# Patient Record
Sex: FEMALE | Race: BLACK OR AFRICAN AMERICAN | NOT HISPANIC OR LATINO | URBAN - METROPOLITAN AREA
[De-identification: names, ages, dates, MRNs, and addresses within clinical notes are randomized per-mention and may not be internally consistent; named-entity substitution may affect disease eponyms.]

---

## 2022-11-03 ENCOUNTER — INPATIENT (INPATIENT)
Facility: HOSPITAL | Age: 62
LOS: 8 days | Discharge: NURSING, REHAB OR LONG-TERM CARE | DRG: 641 | End: 2022-11-12
Attending: INTERNAL MEDICINE | Admitting: INTERNAL MEDICINE
Payer: MEDICAID

## 2022-11-03 VITALS
OXYGEN SATURATION: 99 % | HEIGHT: 63 IN | DIASTOLIC BLOOD PRESSURE: 79 MMHG | RESPIRATION RATE: 18 BRPM | TEMPERATURE: 98 F | HEART RATE: 108 BPM | WEIGHT: 125 LBS | SYSTOLIC BLOOD PRESSURE: 124 MMHG

## 2022-11-03 LAB
ALBUMIN SERPL ELPH-MCNC: 3.7 G/DL — SIGNIFICANT CHANGE UP (ref 3.3–5)
ALBUMIN SERPL ELPH-MCNC: 3.8 G/DL — SIGNIFICANT CHANGE UP (ref 3.3–5)
ALP SERPL-CCNC: 74 U/L — SIGNIFICANT CHANGE UP (ref 40–120)
ALP SERPL-CCNC: 78 U/L — SIGNIFICANT CHANGE UP (ref 40–120)
ALT FLD-CCNC: 11 U/L — SIGNIFICANT CHANGE UP (ref 10–45)
ALT FLD-CCNC: 11 U/L — SIGNIFICANT CHANGE UP (ref 10–45)
ANION GAP SERPL CALC-SCNC: 13 MMOL/L — SIGNIFICANT CHANGE UP (ref 5–17)
ANION GAP SERPL CALC-SCNC: 8 MMOL/L — SIGNIFICANT CHANGE UP (ref 5–17)
APAP SERPL-MCNC: <5 UG/ML — LOW (ref 10–30)
APTT BLD: 27 SEC — LOW (ref 27.5–35.5)
AST SERPL-CCNC: 20 U/L — SIGNIFICANT CHANGE UP (ref 10–40)
AST SERPL-CCNC: 20 U/L — SIGNIFICANT CHANGE UP (ref 10–40)
BASOPHILS # BLD AUTO: 0.05 K/UL — SIGNIFICANT CHANGE UP (ref 0–0.2)
BASOPHILS NFR BLD AUTO: 0.5 % — SIGNIFICANT CHANGE UP (ref 0–2)
BILIRUB SERPL-MCNC: 0.3 MG/DL — SIGNIFICANT CHANGE UP (ref 0.2–1.2)
BILIRUB SERPL-MCNC: 0.3 MG/DL — SIGNIFICANT CHANGE UP (ref 0.2–1.2)
BUN SERPL-MCNC: 6 MG/DL — LOW (ref 7–23)
BUN SERPL-MCNC: 9 MG/DL — SIGNIFICANT CHANGE UP (ref 7–23)
CALCIUM SERPL-MCNC: 8.6 MG/DL — SIGNIFICANT CHANGE UP (ref 8.4–10.5)
CALCIUM SERPL-MCNC: 9.2 MG/DL — SIGNIFICANT CHANGE UP (ref 8.4–10.5)
CHLORIDE SERPL-SCNC: 104 MMOL/L — SIGNIFICANT CHANGE UP (ref 96–108)
CHLORIDE SERPL-SCNC: 98 MMOL/L — SIGNIFICANT CHANGE UP (ref 96–108)
CO2 SERPL-SCNC: 27 MMOL/L — SIGNIFICANT CHANGE UP (ref 22–31)
CO2 SERPL-SCNC: 27 MMOL/L — SIGNIFICANT CHANGE UP (ref 22–31)
CREAT SERPL-MCNC: 0.63 MG/DL — SIGNIFICANT CHANGE UP (ref 0.5–1.3)
CREAT SERPL-MCNC: 0.71 MG/DL — SIGNIFICANT CHANGE UP (ref 0.5–1.3)
EGFR: 100 ML/MIN/1.73M2 — SIGNIFICANT CHANGE UP
EGFR: 95 ML/MIN/1.73M2 — SIGNIFICANT CHANGE UP
EOSINOPHIL # BLD AUTO: 0.07 K/UL — SIGNIFICANT CHANGE UP (ref 0–0.5)
EOSINOPHIL NFR BLD AUTO: 0.7 % — SIGNIFICANT CHANGE UP (ref 0–6)
ETHANOL SERPL-MCNC: <10 MG/DL — SIGNIFICANT CHANGE UP (ref 0–10)
GLUCOSE SERPL-MCNC: 101 MG/DL — HIGH (ref 70–99)
GLUCOSE SERPL-MCNC: 96 MG/DL — SIGNIFICANT CHANGE UP (ref 70–99)
HCT VFR BLD CALC: 39.3 % — SIGNIFICANT CHANGE UP (ref 34.5–45)
HGB BLD-MCNC: 13.5 G/DL — SIGNIFICANT CHANGE UP (ref 11.5–15.5)
IMM GRANULOCYTES NFR BLD AUTO: 0.4 % — SIGNIFICANT CHANGE UP (ref 0–0.9)
INR BLD: 0.99 — SIGNIFICANT CHANGE UP (ref 0.88–1.16)
LYMPHOCYTES # BLD AUTO: 2.25 K/UL — SIGNIFICANT CHANGE UP (ref 1–3.3)
LYMPHOCYTES # BLD AUTO: 23 % — SIGNIFICANT CHANGE UP (ref 13–44)
MAGNESIUM SERPL-MCNC: 1.4 MG/DL — LOW (ref 1.6–2.6)
MCHC RBC-ENTMCNC: 34.4 GM/DL — SIGNIFICANT CHANGE UP (ref 32–36)
MCHC RBC-ENTMCNC: 34.4 PG — HIGH (ref 27–34)
MCV RBC AUTO: 100.3 FL — HIGH (ref 80–100)
MONOCYTES # BLD AUTO: 0.59 K/UL — SIGNIFICANT CHANGE UP (ref 0–0.9)
MONOCYTES NFR BLD AUTO: 6 % — SIGNIFICANT CHANGE UP (ref 2–14)
NEUTROPHILS # BLD AUTO: 6.77 K/UL — SIGNIFICANT CHANGE UP (ref 1.8–7.4)
NEUTROPHILS NFR BLD AUTO: 69.4 % — SIGNIFICANT CHANGE UP (ref 43–77)
NRBC # BLD: 0 /100 WBCS — SIGNIFICANT CHANGE UP (ref 0–0)
PLATELET # BLD AUTO: 143 K/UL — LOW (ref 150–400)
POTASSIUM SERPL-MCNC: 2.8 MMOL/L — CRITICAL LOW (ref 3.5–5.3)
POTASSIUM SERPL-MCNC: 3.8 MMOL/L — SIGNIFICANT CHANGE UP (ref 3.5–5.3)
POTASSIUM SERPL-SCNC: 2.8 MMOL/L — CRITICAL LOW (ref 3.5–5.3)
POTASSIUM SERPL-SCNC: 3.8 MMOL/L — SIGNIFICANT CHANGE UP (ref 3.5–5.3)
PROT SERPL-MCNC: 6.7 G/DL — SIGNIFICANT CHANGE UP (ref 6–8.3)
PROT SERPL-MCNC: 6.7 G/DL — SIGNIFICANT CHANGE UP (ref 6–8.3)
PROTHROM AB SERPL-ACNC: 11.8 SEC — SIGNIFICANT CHANGE UP (ref 10.5–13.4)
RBC # BLD: 3.92 M/UL — SIGNIFICANT CHANGE UP (ref 3.8–5.2)
RBC # FLD: 12.3 % — SIGNIFICANT CHANGE UP (ref 10.3–14.5)
SALICYLATES SERPL-MCNC: <0.3 MG/DL — LOW (ref 2.8–20)
SARS-COV-2 RNA SPEC QL NAA+PROBE: NEGATIVE — SIGNIFICANT CHANGE UP
SODIUM SERPL-SCNC: 138 MMOL/L — SIGNIFICANT CHANGE UP (ref 135–145)
SODIUM SERPL-SCNC: 139 MMOL/L — SIGNIFICANT CHANGE UP (ref 135–145)
TROPONIN T SERPL-MCNC: 0.01 NG/ML — SIGNIFICANT CHANGE UP (ref 0–0.01)
WBC # BLD: 9.77 K/UL — SIGNIFICANT CHANGE UP (ref 3.8–10.5)
WBC # FLD AUTO: 9.77 K/UL — SIGNIFICANT CHANGE UP (ref 3.8–10.5)

## 2022-11-03 PROCEDURE — 70496 CT ANGIOGRAPHY HEAD: CPT | Mod: 26,MA

## 2022-11-03 PROCEDURE — 99285 EMERGENCY DEPT VISIT HI MDM: CPT

## 2022-11-03 PROCEDURE — 70450 CT HEAD/BRAIN W/O DYE: CPT | Mod: 26,MA,59

## 2022-11-03 PROCEDURE — 70498 CT ANGIOGRAPHY NECK: CPT | Mod: 26,MA

## 2022-11-03 PROCEDURE — 71045 X-RAY EXAM CHEST 1 VIEW: CPT | Mod: 26

## 2022-11-03 PROCEDURE — 72125 CT NECK SPINE W/O DYE: CPT | Mod: 26,MA

## 2022-11-03 PROCEDURE — 99221 1ST HOSP IP/OBS SF/LOW 40: CPT

## 2022-11-03 RX ORDER — ACYCLOVIR SODIUM 500 MG
570 VIAL (EA) INTRAVENOUS ONCE
Refills: 0 | Status: COMPLETED | OUTPATIENT
Start: 2022-11-03 | End: 2022-11-03

## 2022-11-03 RX ORDER — POTASSIUM CHLORIDE 20 MEQ
20 PACKET (EA) ORAL
Refills: 0 | Status: COMPLETED | OUTPATIENT
Start: 2022-11-03 | End: 2022-11-03

## 2022-11-03 RX ORDER — ACYCLOVIR SODIUM 500 MG
VIAL (EA) INTRAVENOUS
Refills: 0 | Status: DISCONTINUED | OUTPATIENT
Start: 2022-11-03 | End: 2022-11-03

## 2022-11-03 RX ORDER — POTASSIUM CHLORIDE 20 MEQ
40 PACKET (EA) ORAL ONCE
Refills: 0 | Status: COMPLETED | OUTPATIENT
Start: 2022-11-03 | End: 2022-11-03

## 2022-11-03 RX ORDER — ACETAMINOPHEN 500 MG
975 TABLET ORAL ONCE
Refills: 0 | Status: COMPLETED | OUTPATIENT
Start: 2022-11-03 | End: 2022-11-03

## 2022-11-03 RX ORDER — MAGNESIUM SULFATE 500 MG/ML
2 VIAL (ML) INJECTION ONCE
Refills: 0 | Status: COMPLETED | OUTPATIENT
Start: 2022-11-03 | End: 2022-11-03

## 2022-11-03 RX ORDER — SODIUM CHLORIDE 9 MG/ML
1000 INJECTION INTRAMUSCULAR; INTRAVENOUS; SUBCUTANEOUS ONCE
Refills: 0 | Status: COMPLETED | OUTPATIENT
Start: 2022-11-03 | End: 2022-11-03

## 2022-11-03 RX ORDER — THIAMINE MONONITRATE (VIT B1) 100 MG
100 TABLET ORAL ONCE
Refills: 0 | Status: COMPLETED | OUTPATIENT
Start: 2022-11-03 | End: 2022-11-03

## 2022-11-03 RX ADMIN — SODIUM CHLORIDE 1000 MILLILITER(S): 9 INJECTION INTRAMUSCULAR; INTRAVENOUS; SUBCUTANEOUS at 15:50

## 2022-11-03 RX ADMIN — Medication 50 MILLIEQUIVALENT(S): at 16:52

## 2022-11-03 RX ADMIN — Medication 25 GRAM(S): at 16:52

## 2022-11-03 RX ADMIN — Medication 40 MILLIEQUIVALENT(S): at 16:53

## 2022-11-03 RX ADMIN — Medication 50 MILLIEQUIVALENT(S): at 23:30

## 2022-11-03 RX ADMIN — Medication 50 MILLIEQUIVALENT(S): at 20:43

## 2022-11-03 RX ADMIN — Medication 100 MILLIGRAM(S): at 20:24

## 2022-11-03 RX ADMIN — Medication 570 MILLIGRAM(S): at 22:05

## 2022-11-03 RX ADMIN — Medication 1 MILLIGRAM(S): at 18:19

## 2022-11-03 RX ADMIN — Medication 100 MILLIGRAM(S): at 21:45

## 2022-11-03 NOTE — ED PROVIDER NOTE - CLINICAL SUMMARY MEDICAL DECISION MAKING FREE TEXT BOX
pt poor historian, majority information from friend Theo Montes [phone]  62 F h/o etoh abuse (approx 6-8 beers daily) brought in by friends for AMS and difficulty walking, + recent falls.  on exam pt mild tachycardia, normotensive, no tremor/tongue fasciculation, a/ox2 (unsure date, states June 2024), unable to ambulate but no focal deficits noted so less likely cva.  c/f ICH, hydrocephalus, substance induced, wernicke vs metabolic encephalopathy.  does not appear to be in etoh w/d.  will obtain labs, cxr, CT head/cspine, CTA head/neck

## 2022-11-03 NOTE — ED PROVIDER NOTE - NS ED ATTENDING STATEMENT MOD
This was a shared visit with the KEY. I reviewed and verified the documentation and independently performed the documented:

## 2022-11-03 NOTE — ED ADULT NURSE NOTE - SUICIDE SCREENING QUESTION 3
Last Sat 9/4/2021 she started with  Nausea, dizziness and profuse sweating symp continue Sun  Mon  Now more clammy than sweating   Able to eating but do not feeling, drinking fluids   Dizziness   A rush feeling pounding eyes go side to side        Denies chest pain, SOB  
Per ROCIO Flower NP  Appointment scheduled 3:50 9/9/2021  Patient informed if symptoms become worse or is concerned should go to the ER or UC.    Patient agrees with plan  
No

## 2022-11-03 NOTE — ED ADULT NURSE NOTE - OBJECTIVE STATEMENT
62y female presents to ED c/o multiple falls in the last month, generalized body aches, dizziness, and failure to thrive. Presents with friends/roomates who state pt has had multiple falls starting one month ago, most recently three days ago and sustained swelling to left forehead. Since falls pt has been less able to walk around and has not been using the bathroom as much. Pt endorses RLQ pain and states she hasn't been eating/drinking as usual. Per friends, pt usually drinks 6 pack of beer a day but has been drinking less due to decreased mobility. GLORIA. A&Ox3, disoriented to time/situation.

## 2022-11-03 NOTE — ED ADULT TRIAGE NOTE - OTHER COMPLAINTS
Friend also reports patient drinks alcohol daily, reports last drink was yesterday. Friend also reports patient drinks alcohol daily, reports last drink was yesterday. Friend reports patient has been urinating on herself.

## 2022-11-03 NOTE — ED PROVIDER NOTE - OBJECTIVE STATEMENT
pt poor historian, majority information from friend Theo Montes [phone]  62 F h/o etoh abuse (approx 6-8 beers daily) brought in by friends for AMS and difficulty walking.  As per friend she fell 1 month ago sustained lac to forehead but does not believe she was ever evaluated.  Pt noted approx 5 days ago she was on ground of her apartment stating she just wanted to stretch but when he returned 6+ hrs later she had not moved, since has been having difficulty walking.  Since the weekend pt has been progressively altered, not eating or taking care of herself.  friend has been giving her approx 1-2 beers daily to "prevent alcohol withdraw."  friend also reports she has been peeing bed as she cannot get up to walk- pt states she knows when she needs to use bathroom but cant make it.  no bowel incontinence.  pt states she has chronic R hip pain from injury years ago, friend confirms.  denies any other complaints but when attempted to ambulate she reported feeling dizzy and states its going on for a month- cannot further described dizziness.  denies f/c, headache, neck/back pain, chest pain, sob, uri sxs, abd pain, nvd, urinary sxs, numbness/weakness, paresthesia, saddle anesthesia.  denies drug use pt poor historian, majority information from friend Theo Montse [phone]  62 F h/o etoh abuse (approx 6-8 beers daily) brought in by friends for AMS and difficulty walking.  As per friend she fell 1 month ago sustained lac to forehead but does not believe she was ever evaluated.  Pt noted approx 5 days ago she was on ground of her apartment (thinks she fell) stating she just wanted to stretch but when he returned 6+ hrs later she had not moved, since has been having difficulty walking.  Since the weekend pt has been progressively altered, not eating or taking care of herself.  friend has been giving her approx 1-2 beers daily to "prevent alcohol withdraw."  friend also reports she has been peeing bed as she cannot get up to walk- pt states she knows when she needs to use bathroom but cant make it.  no bowel incontinence.  pt states she has chronic R hip pain from injury years ago, friend confirms.  denies any other complaints but when attempted to ambulate she reported feeling dizzy and states its going on for a month- cannot further described dizziness.  denies f/c, headache, neck/back pain, chest pain, sob, uri sxs, abd pain, nvd, urinary sxs, numbness/weakness, paresthesia, saddle anesthesia.  denies drug use

## 2022-11-03 NOTE — ED PROVIDER NOTE - PHYSICAL EXAMINATION
Vitals reviewed  Gen: comfortable appearing, nad, speaking in full sentences- no dysarthria  Skin: wwp, no rash/lesions  HEENT: nc, healed scar to forehead, no acute trauma noted, perrla, eomi, mmm  Neck/Back: no midline ttp/step off   CV: rrr, no audible m/r/g  Resp: symmetrical expansion, ctab, no w/r/r  Abd: nondistended, soft/nt  Ext: FROM throughout, no peripheral edema, no joint ttp, distal pulses 2+, SILT equal throughout, 5/5 strength x4  Neuro: a/ox2 (unsure date, states June 2024), no focal deficits, follows commands, unsteady gait w/ 2 person assist, no pronator drift

## 2022-11-03 NOTE — CONSULT NOTE ADULT - SUBJECTIVE AND OBJECTIVE BOX
Neurology Consult Note    HPI:  This is a 62 F with PMHx of ETOH abuse (approx 6-8 beers daily, last drink 24 hours ago) brought in by friends for AMS and difficulty walking. The patient is a poor historian, and majority of the information was obtained from friend Theo Montes [phone]. As per friend, she fell about 1 month ago and sustained a laceration to forehead but does not believe she was ever evaluated. She also lost her mother around 2 years ago and as per the friend who lives with her, and has been more isolated and depressed since then. The patient approximately 5 days ago was on the round of her apartment (she thinks she fell) stating she just wanted to stretch, but when he returned 6+ hrs later she had not moved. He states that she has not been eating or drinking well over the last 2 weeks or so. He has also stated that she mostly stays in her recliner and he has to keep a commode there when she needs to use the restroom. Since the weekend, the patient has been progressively altered and not taking care of herself. Her friend has been giving her approximately 1-2 beers daily over the past few days, which he states is to "prevent alcohol withdraw." She also has been seen doing cocaine "occasionally" as per the friend and she smokes cigarettes 1 pack per day. Her friend reports she has been peeing bed and chair at times as she cannot always get up - patient states she knows when she needs to use bathroom but cant make it. Denies any bowel incontinence. The patient also has stated that she has chronic R hip pain from injury years ago, which the friend confirmed. Denies any other complaints, when attempted to ambulate she reported feeling dizziness and states its going on for a month. Denies f/c, headache, neck/back pain, chest pain, sob, uri sxs, abd pain, nvd, urinary sxs, numbness/weakness, paresthesia, saddle anesthesia.      PAST MEDICAL & SURGICAL HISTORY:  ETOH abuse      Medications:  acyclovir IVPB 570 milliGRAM(s) IV Intermittent once  potassium chloride  20 mEq/100 mL IVPB 20 milliEquivalent(s) IV Intermittent every 2 hours      Vital Signs Last 24 Hrs  T(C): 36.1 (03 Nov 2022 19:05), Max: 36.6 (03 Nov 2022 14:18)  T(F): 96.9 (03 Nov 2022 19:05), Max: 97.9 (03 Nov 2022 14:18)  HR: 108 (03 Nov 2022 14:18) (108 - 108)  BP: 124/79 (03 Nov 2022 14:18) (124/79 - 124/79)  RR: 18 (03 Nov 2022 14:18) (18 - 18)  SpO2: 99% (03 Nov 2022 14:18) (99% - 99%)    Parameters below as of 03 Nov 2022 14:18  Patient On (Oxygen Delivery Method): room air      Neurological Exam:   Mental status: Lethargic, but arousable. AAOx0. States "get away from". No dysarthria, no aphasia. Follows commands to squeeze hand.  Cranial nerves: Pupils equally round, pinpoint and reactive to light, face symmetric, hearing intact to finger rub, tongue was midline.  Motor: All four extremities anti-gravity.  strength 5/5. Normal tone and bulk. No abnormal movements.    Sensation: Withdraws to painful stimuli.  Coordination: Unable to assess.  Reflexes: 2+ in bilateral UE/LE, downgoing toes bilaterally.  Gait: Narrow and slow, steady. No ataxia.       Labs:  CBC Full  -  ( 03 Nov 2022 15:56 )  WBC Count : 9.77 K/uL  RBC Count : 3.92 M/uL  Hemoglobin : 13.5 g/dL  Hematocrit : 39.3 %  Platelet Count - Automated : 143 K/uL  Mean Cell Volume : 100.3 fl  Mean Cell Hemoglobin : 34.4 pg  Mean Cell Hemoglobin Concentration : 34.4 gm/dL  Auto Neutrophil # : 6.77 K/uL  Auto Lymphocyte # : 2.25 K/uL  Auto Monocyte # : 0.59 K/uL  Auto Eosinophil # : 0.07 K/uL  Auto Basophil # : 0.05 K/uL  Auto Neutrophil % : 69.4 %  Auto Lymphocyte % : 23.0 %  Auto Monocyte % : 6.0 %  Auto Eosinophil % : 0.7 %  Auto Basophil % : 0.5 %    11-03    138  |  98  |  9   ----------------------------<  101<H>  2.8<LL>   |  27  |  0.71    Ca    9.2      03 Nov 2022 15:56  Mg     1.4     11-03    TPro  6.7  /  Alb  3.8  /  TBili  0.3  /  DBili  x   /  AST  20  /  ALT  11  /  AlkPhos  78  11-03    LIVER FUNCTIONS - ( 03 Nov 2022 15:56 )  Alb: 3.8 g/dL / Pro: 6.7 g/dL / ALK PHOS: 78 U/L / ALT: 11 U/L / AST: 20 U/L / GGT: x           PT/INR - ( 03 Nov 2022 15:56 )   PT: 11.8 sec;   INR: 0.99       PTT - ( 03 Nov 2022 15:56 )  PTT:27.0 sec      < from: CT Head No Cont (11.03.22 @ 19:08) >  PROCEDURE DATE:  11/03/2022          INTERPRETATION:  Axial images were obtained from the skull base to the   cranial vertex without intravenous contrast. Soft tissue and bone   algorithm images were evaluated.    Clinical information: Multiple falls, confusion, dizziness.    The study is substantially degraded by patient motion.    There is diffuse cerebral volume loss without hydrocephalus. There is no   midline shift or large extracerebral collection. There is no large   parenchymal hematoma. There is extensive confluent hypodensity in the   cerebral white matter that is nonspecific, with remote lacunar infarcts   identified in the bilateral basal ganglia and thalami. There is no   displaced fracture of the skull or skull base. Included paranasal sinuses   and mastoid air cells are predominantly ventilated.    IMPRESSION:    Severely motion degraded study shows diffuse cerebral volume loss and   extensive white matter hypodensity without midline shift, mass effect or   large hematoma.    --- End of Report ---    < end of copied text >      < from: CT Cervical Spine No Cont (11.03.22 @ 19:10) >  PROCEDURE DATE:  11/03/2022          INTERPRETATION:  A thin section axial acquisition was performed through   the cervical spine and reconstructed in the sagittal, coronal and axial   planes utilizing bone and soft tissue algorithms.  Intravenous contrast   was not administered.    Clinical information: Multiple falls.    The cervical vertebrae are normal in height with minimal retrolisthesis   of C4 on C5 that is likely degenerative. There is no fracture. There is   no prevertebral soft tissue swelling. There is multilevel degenerative   disease of the cervical disks as well as the facet and uncovertebral   joints with central disc protrusions from C3/C4 through C5/C6. The   included cervical soft tissues are grossly unremarkable.    IMPRESSION:    No fracture. Multilevel degenerative disease with central disc   protrusions, as above.    --- End of Report ---    < end of copied text >      < from: CT Angio Head w/ IV Cont (11.03.22 @ 19:11) >  PROCEDURE DATE:  11/03/2022    ******PRELIMINARY REPORT******      ******PRELIMINARY REPORT******           INTERPRETATION:  [facility] PRELIMINARY REPORT      Initial report created on 11/3/2022 7:39:24 PM EDT  PROCEDURE INFORMATION:  Exam: CTA Head With Contrast, Arteriography  Exam date and time: 11/3/2022 6:28 PM  Age: 62 years old  Clinical indication: Mult falls, confusion, dizziness    TECHNIQUE:  Imaging protocol: Computed tomographic angiography of the head with   contrast,  including non-contrast images if performed. Exam focused on the arteries.    COMPARISON:  No relevant prior studies available.    FINDINGS:    ANTERIOR CIRCULATION:  Right internal carotid artery: Intracranial segment is patent with no  significant stenosis. No aneurysm.  Right middle cerebral artery: No occlusion or significant stenosis. No  aneurysm.  Right anterior cerebral artery: No occlusion or significant stenosis. No  aneurysm.    Left internal carotid artery: Intracranial segment is patent with no  significant stenosis. No aneurysm.  Left middle cerebral artery: No occlusion or significant stenosis. No   aneurysm.    Left anterior cerebral artery: No occlusion or significant stenosis. No  aneurysm.    POSTERIOR CIRCULATION:  Right vertebral artery: No occlusion or significant stenosis. No aneurysm.  Left vertebral artery: No occlusion or significant stenosis. No aneurysm.  Basilar artery: No occlusion or significant stenosis. No aneurysm.  Right posterior cerebral artery: Fetal origin of the right posterior   cerebral  artery is a common developmental variant and there is no occlusion or  significant stenosis. No aneurysm.  Left posterior cerebral artery: No occlusion or significant stenosis. No  aneurysm.    IMPRESSION:  No large vessel stenosis or occlusion detected involving the major   branches of  the anterior or posterior intracranial circulation.    < end of copied text >

## 2022-11-03 NOTE — ED ADULT NURSE NOTE - NSIMPLEMENTINTERV_GEN_ALL_ED
Implemented All Fall Risk Interventions:  [place] to call system. Call bell, personal items and telephone within reach. Instruct patient to call for assistance. Room bathroom lighting operational. Non-slip footwear when patient is off stretcher. Physically safe environment: no spills, clutter or unnecessary equipment. Stretcher in lowest position, wheels locked, appropriate side rails in place. Provide visual cue, wrist band, yellow gown, etc. Monitor gait and stability. Monitor for mental status changes and reorient to person, place, and time. Review medications for side effects contributing to fall risk. Reinforce activity limits and safety measures with patient and family.

## 2022-11-03 NOTE — CONSULT NOTE ADULT - SUBJECTIVE AND OBJECTIVE BOX
MICU CONSULT NOTE    HPI:  63 y/o F poor historian w PMHx of etoh abuse, bib friend for ams, difficulty ambulating, not caring for herself x 2 wks. As per friend patient feel 1 month ago, with trauma to the head but unsure if the patient seek medical attention. As per him the patient has been confused and altered since 2 weeks ago. Patient saying her mother is still alive, and saying people are around the house although there is none. 2 weeks ago patient had decreased PO intake, and decreased mobility and unsteadiness. For the last days friend has probably provided 1-2 beers per day to avoid withdrawal. Patient previously was consume 6 beers per day. Friend unsure if patient has history of seizures, intubation, previous alcohol withdrawals nor esophageal bleeds. Patient was found down on Sunday, and after that patient has dependent of friend for mobility. As per the ED provider, patient initially was AAO x 2, no tongue fasiculations, or tremors. + unsteady on feet and unable to ambulate. ICU consulted for concerns of Wernicke encephalopathy.     Unable to obtain ROS.     PMH/PSH  As per friend no medications at home. Patient unable to provide PMH/PSH.     FH:  Unable to obtain, patient altered not able to cooperate.     SH:  Patient lives with a friend that acts like a care giver.   Patient with long standing history of alcohol use disorder, unsure how much nor for how long she has been a drinker. As per friend she lately was having 6 beers a day, and in the last days was giving 1-2 beers in order to prevent withdrawal.  Current smoker      ED COURSE:   VS: /79      RR 18    SaO2 99 @RA    T 97.9  LABS: HGB 13.5  .3  K 2.8  Creat 0.71  Mag 1.4  Trop T 0.01  ABL <10  Acetaminophen <5  COVID negative     EKG: diffuse T wave inversion I, II, III, aVF, V3-V6  IMAGING:   -CXR: No acute cardiopulmonary process.  -CTH: performed, will follow read   MANAGEMENT Magnesium 2 mg IV x1, KCl 20 mEq IV x3 doses, KCl 40 mEq PO x 1, Lorazepam 1 mg IV x1, Thiamine 100 mg IV x1, 1L NS       VITAL SIGNS:  T(F): 97.9 (11-03-22 @ 14:18)  HR: 108 (11-03-22 @ 14:18)  BP: 124/79 (11-03-22 @ 14:18)  RR: 18 (11-03-22 @ 14:18)  SpO2: 99% (11-03-22 @ 14:18)  Wt(kg): --    PHYSICAL EXAM:    Constitutional: sleeping, arousable,   HEENT: PERRLA, EOMI, Normal Hearing, mildly dry MM  Neck: No LAD, No JVD  Back: Normal spine flexure, no pain upon palpation, no hematoma, stage 2 small ulcers in sacrum  Respiratory: CTA BL, no respiratory distress  Cardiovascular: S1 and S2, RRR, no M/G/R  Gastrointestinal: BS+, soft, NT/ND  Extremities: No peripheral edema  Vascular: 2+ peripheral pulses  Neurological: no focal deficits appreciated, moving all extremities       MEDICATIONS  (STANDING):  potassium chloride  20 mEq/100 mL IVPB 20 milliEquivalent(s) IV Intermittent every 2 hours  thiamine Injectable 100 milliGRAM(s) IV Push Once    MEDICATIONS  (PRN):      Allergies    No Known Allergies    Intolerances        LABS:                        13.5   9.77  )-----------( 143      ( 03 Nov 2022 15:56 )             39.3     11-03    138  |  98  |  9   ----------------------------<  101<H>  2.8<LL>   |  27  |  0.71    Ca    9.2      03 Nov 2022 15:56  Mg     1.4     11-03    TPro  6.7  /  Alb  3.8  /  TBili  0.3  /  DBili  x   /  AST  20  /  ALT  11  /  AlkPhos  78  11-03    PT/INR - ( 03 Nov 2022 15:56 )   PT: 11.8 sec;   INR: 0.99          PTT - ( 03 Nov 2022 15:56 )  PTT:27.0 sec      RADIOLOGY & ADDITIONAL TESTS:     MICU CONSULT NOTE    HPI:  62F poor historian PMHx of EtoH abuse (6 beers daily), MDD bib friend/roommate for AMS, difficulty ambulating, inability of caring for herself x 2 wks. As per friend patient feel 1 month ago, with trauma to the head but unsure if the patient seeked medical attention at that time although he told her to do so. Friend noted that last Sunday he found pt on ground of her apartment (thinks she fell but not sure) pt did not let him help her stating she just wanted to stretch so he left for work but when he returned 6+ hrs later she had not moved from that position. At that time he picked her up and placed on bed and since that event pt has been predominantly bedbound incontinent of bladder/bowel as not able to move 2/2 back pain and LE weakness. When asked why pt was not brought to ED for evaluation till now he said "called her family (brother) but they were not available to take pt to ED and friend could not either as he was working and had time off today so brought pt to ED today". Friend state since the weekend pt has been progressively altered, not eating or taking care of herself. He has been giving her approx 1-2 beers daily to "prevent alcohol withdraw."  Friend also reports patient has been confused and altered saying her mother is still alive, and saying people are around the house although there is none X 2wks, getting progressively worse. As per the ED provider, patient initially was AAO x 2, no tongue fasiculations, or tremors. + Unsteady on feet and unable to ambulate. ICU consulted for encephalopathy and inability to ambulate 2/2 LE weakness. Pt seen in the ED s/p Ativan 1mg IV for reported agitation in the ED. Pt AOX1 (self) at the time of assessment and unable to provide much history. Friend unsure if patient has history of previous alcohol withdrawals, hospitalizations, seizures, toxic habits, and use of daily medications at home. Per friend a month ago pt was AOX3 and able of caring for herself in regards to everything except bills/ iADLs, he was helping her w/ those activities. Unable to obtain ROS 2/2 AMS    PMH/PSH  As per friend no medications at home. Patient unable to provide PMH/PSH.     FH:  Unable to obtain, patient altered not able to cooperate.     SH:  Patient lives with a friend/ roommate that acts like a caregiver.   Patient with long standing history of alcohol use disorder, unsure how much nor for how long she has been a drinker. As per friend she lately was having 6 beers a day, and in the last days was giving 1-2 beers in order to prevent withdrawal.  Current smoker  Not sure of recreational drug use hx       ED COURSE:   VS: /79      RR 18    SaO2 99 @RA    T 97.9  LABS: HGB 13.5  .3  K 2.8  Creat 0.71  Mag 1.4  Trop T 0.01  ABL <10  Acetaminophen <5  COVID negative     EKG: diffuse T wave inversion I, II, III, aVF, V3-V6  IMAGING:   -CXR: No acute cardiopulmonary process.  MANAGEMENT Magnesium 2 mg IV x1, KCl 20 mEq IV x3 doses, KCl 40 mEq PO x 1, Lorazepam 1 mg IV x1, Thiamine 100 mg IV x1, 1L NS       VITAL SIGNS:  T(F): 97.9 (11-03-22 @ 14:18)  HR: 108 (11-03-22 @ 14:18)  BP: 124/79 (11-03-22 @ 14:18)  RR: 18 (11-03-22 @ 14:18)  SpO2: 99% (11-03-22 @ 14:18)  Wt(kg): --    PHYSICAL EXAM:    Constitutional: sleeping, arousable,   HEENT: PERRLA, EOMI, Normal Hearing, mildly dry MM  Neck: No LAD, No JVD  Back: Normal spine flexure, no pain upon palpation, no hematoma, stage 2 small ulcers in sacrum  Respiratory: CTA BL, no respiratory distress  Cardiovascular: S1 and S2, RRR, no M/G/R  Gastrointestinal: BS+, soft, NT/ND  Extremities: No peripheral edema; 3/5 RLE strength and 5/5 LLE strength  Vascular: 2+ peripheral pulses  Neurological: no focal deficits appreciated, moving all extremities       MEDICATIONS  (STANDING):  potassium chloride  20 mEq/100 mL IVPB 20 milliEquivalent(s) IV Intermittent every 2 hours  thiamine Injectable 100 milliGRAM(s) IV Push Once    MEDICATIONS  (PRN):      Allergies    No Known Allergies    Intolerances        LABS:                        13.5   9.77  )-----------( 143      ( 03 Nov 2022 15:56 )             39.3     11-03    138  |  98  |  9   ----------------------------<  101<H>  2.8<LL>   |  27  |  0.71    Ca    9.2      03 Nov 2022 15:56  Mg     1.4     11-03    TPro  6.7  /  Alb  3.8  /  TBili  0.3  /  DBili  x   /  AST  20  /  ALT  11  /  AlkPhos  78  11-03    PT/INR - ( 03 Nov 2022 15:56 )   PT: 11.8 sec;   INR: 0.99          PTT - ( 03 Nov 2022 15:56 )  PTT:27.0 sec      RADIOLOGY & ADDITIONAL TESTS:     MICU CONSULT NOTE    HPI:  62F poor historian PMHx of EtoH abuse (6 beers daily), MDD bib friend/roommate for AMS, difficulty ambulating, inability of caring for herself s/p recent falls.  As per friend patient feel 1 month ago, with trauma to the head but unsure if the patient seeked medical attention at that time although he told her to do so. Friend noted that last Sunday he found pt on ground of her apartment (thinks she fell but not sure) pt did not let him help her stating she just wanted to stretch so he left for work but when he returned 6+ hrs later she had not moved from that position. At that time he picked her up and placed on bed and since that event pt has been predominantly bedbound incontinent of bladder/bowel as not able to move 2/2 back pain and LE weakness. When asked why pt was not brought to ED for evaluation till now he said "called her family (brother) but they were not available to take pt to ED and friend could not either as he was working and had time off today so brought pt to ED today". Friend state since the weekend pt has been progressively altered, not eating or taking care of herself. He has been giving her approx 1-2 beers daily to "prevent alcohol withdraw."  Friend also reports patient has been confused and altered saying her mother is still alive, and saying people are around the house although there is none X 2wks, getting progressively worse. As per the ED provider, patient initially was AAO x 2, no tongue fasiculations, or tremors. + Unsteady on feet and unable to ambulate. ICU consulted for encephalopathy and inability to ambulate 2/2 LE weakness. Pt seen in the ED s/p Ativan 1mg IV for reported agitation in the ED. Pt AOX1 (self) at the time of assessment and unable to provide much history. Friend unsure if patient has history of previous alcohol withdrawals, hospitalizations, seizures, toxic habits, and use of daily medications at home. Per friend a month ago pt was AOX3 and able of caring for herself in regards to everything except bills/ iADLs, he was helping her w/ those activities. Unable to obtain ROS 2/2 AMS    PMH/PSH  As per friend no medications at home. Patient unable to provide PMH/PSH.     FH:  Unable to obtain, patient altered not able to cooperate.     SH:  Patient lives with a friend/ roommate that acts like a caregiver.   Patient with long standing history of alcohol use disorder, unsure how much nor for how long she has been a drinker. As per friend she lately was having 6 beers a day, and in the last days was giving 1-2 beers in order to prevent withdrawal.  Current smoker  Not sure of recreational drug use hx       ED COURSE:   VS: /79      RR 18    SaO2 99 @RA    T 97.9  LABS: HGB 13.5  .3  K 2.8  Creat 0.71  Mag 1.4  Trop T 0.01  ABL <10  Acetaminophen <5  COVID negative     EKG: diffuse T wave inversion I, II, III, aVF, V3-V6  IMAGING:   -CXR: No acute cardiopulmonary process.  MANAGEMENT Magnesium 2 mg IV x1, KCl 20 mEq IV x3 doses, KCl 40 mEq PO x 1, Lorazepam 1 mg IV x1, Thiamine 100 mg IV x1, 1L NS       VITAL SIGNS:  T(F): 97.9 (11-03-22 @ 14:18)  HR: 108 (11-03-22 @ 14:18)  BP: 124/79 (11-03-22 @ 14:18)  RR: 18 (11-03-22 @ 14:18)  SpO2: 99% (11-03-22 @ 14:18)  Wt(kg): --    PHYSICAL EXAM:    Constitutional: sleeping, arousable,   HEENT: PERRLA, EOMI, Normal Hearing, mildly dry MM  Neck: No LAD, No JVD  Back: Normal spine flexure, no pain upon palpation, no hematoma, stage 2 small ulcers in sacrum  Respiratory: CTA BL, no respiratory distress  Cardiovascular: S1 and S2, RRR, no M/G/R  Gastrointestinal: BS+, soft, NT/ND  Extremities: No peripheral edema; 3/5 RLE strength and 5/5 LLE strength  Vascular: 2+ peripheral pulses  Neurological: no focal deficits appreciated, moving all extremities       MEDICATIONS  (STANDING):  potassium chloride  20 mEq/100 mL IVPB 20 milliEquivalent(s) IV Intermittent every 2 hours  thiamine Injectable 100 milliGRAM(s) IV Push Once    MEDICATIONS  (PRN):      Allergies    No Known Allergies    Intolerances        LABS:                        13.5   9.77  )-----------( 143      ( 03 Nov 2022 15:56 )             39.3     11-03    138  |  98  |  9   ----------------------------<  101<H>  2.8<LL>   |  27  |  0.71    Ca    9.2      03 Nov 2022 15:56  Mg     1.4     11-03    TPro  6.7  /  Alb  3.8  /  TBili  0.3  /  DBili  x   /  AST  20  /  ALT  11  /  AlkPhos  78  11-03    PT/INR - ( 03 Nov 2022 15:56 )   PT: 11.8 sec;   INR: 0.99          PTT - ( 03 Nov 2022 15:56 )  PTT:27.0 sec      RADIOLOGY & ADDITIONAL TESTS:     MICU CONSULT NOTE    HPI:  62F poor historian PMHx of EtoH abuse (6 beers daily), MDD bib friend/roommate for AMS, difficulty ambulating, inability of caring for herself s/p recent falls.  As per friend patient feel 1 month ago, with trauma to the head but unsure if the patient seeked medical attention at that time. Friend noted that last Sunday he found pt on the floor of her apartment (thinks she fell but not sure) pt did not let him help stating she just wanted to stretch so he left for work but when he returned 6+ hrs later she had not moved from that position. At that time he picked her up and placed on bed and since that event pt has been predominantly bedbound incontinent of bladder/bowel as not able to move 2/2 back pain and LE weakness. When asked why pt was not brought to ED for evaluation till now he said "called her family (brother) but they were not available to take pt to ED and friend could not either as he was working and had time off today so brought pt to ED today". Friend state since the weekend pt has been progressively altered, not eating or taking care of herself. He has been giving her approx 1-2 beers daily to "prevent alcohol withdrawal", last drink ~this am.Friend also reports patient has been confused and altered saying her mother is still alive, and saying people are around the house although there is none X 2wks, getting progressively worse. As per the ED provider, patient initially was AAO x 2, no tongue fasiculations, or tremors. + Unsteady on feet and unable to ambulate. ICU consulted for encephalopathy and inability to ambulate 2/2 LE weakness. Pt seen in the ED s/p Ativan 1mg IV for reported agitation in the ED. Pt AOX1 (self) at the time of assessment and unable to provide much history. Friend unsure if patient has history of previous alcohol withdrawals, hospitalizations, seizures, toxic habits outside EtOH/tobacco use, and use of daily medications at home. Per friend a month ago pt was AOX3 and able of caring for herself except bills/ iADLs, he was helping her w/ those activities. Unable to obtain ROS 2/2 AMS at the time of assessment.     PMH/PSH  As per friend no medications at home. Patient unable to provide PMH/PSH.     FH:  Unable to obtain, patient altered not able to cooperate.     SH:  Patient lives with a friend/ roommate that acts like a caregiver.   Patient with long standing history of alcohol use disorder, unsure how much nor for how long she has been a drinker. As per friend she lately was having 6 beers a day, and in the last days was giving 1-2 beers in order to prevent withdrawal.  Current smoker  Not sure of recreational drug use hx       ED COURSE:   VS: /79      RR 18    SaO2 99 @RA    T 97.9  LABS: HGB 13.5  .3  K 2.8  Creat 0.71  Mag 1.4  Trop T 0.01  ABL <10  Acetaminophen <5  COVID negative     EKG: diffuse T wave inversion I, II, III, aVF, V3-V6  IMAGING:   -CXR: No acute cardiopulmonary process.  MANAGEMENT Magnesium 2 mg IV x1, KCl 20 mEq IV x3 doses, KCl 40 mEq PO x 1, Lorazepam 1 mg IV x1, Thiamine 100 mg IV x1, 1L NS       VITAL SIGNS:  T(F): 97.9 (11-03-22 @ 14:18)  HR: 108 (11-03-22 @ 14:18)  BP: 124/79 (11-03-22 @ 14:18)  RR: 18 (11-03-22 @ 14:18)  SpO2: 99% (11-03-22 @ 14:18)  Wt(kg): --    PHYSICAL EXAM:    Constitutional: sleeping, arousable,   HEENT: PERRLA, EOMI, Normal Hearing, mildly dry MM  Neck: No LAD, No JVD  Back: Normal spine flexure, no pain upon palpation, no hematoma, stage 2 small ulcers in sacrum  Respiratory: CTA BL, no respiratory distress  Cardiovascular: S1 and S2, RRR, no M/G/R  Gastrointestinal: BS+, soft, NT/ND  Extremities: No peripheral edema; 3/5 RLE strength and 5/5 LLE strength  Vascular: 2+ peripheral pulses  Neurological: no focal deficits appreciated, moving all extremities       MEDICATIONS  (STANDING):  potassium chloride  20 mEq/100 mL IVPB 20 milliEquivalent(s) IV Intermittent every 2 hours  thiamine Injectable 100 milliGRAM(s) IV Push Once    MEDICATIONS  (PRN):      Allergies    No Known Allergies    Intolerances        LABS:                        13.5   9.77  )-----------( 143      ( 03 Nov 2022 15:56 )             39.3     11-03    138  |  98  |  9   ----------------------------<  101<H>  2.8<LL>   |  27  |  0.71    Ca    9.2      03 Nov 2022 15:56  Mg     1.4     11-03    TPro  6.7  /  Alb  3.8  /  TBili  0.3  /  DBili  x   /  AST  20  /  ALT  11  /  AlkPhos  78  11-03    PT/INR - ( 03 Nov 2022 15:56 )   PT: 11.8 sec;   INR: 0.99          PTT - ( 03 Nov 2022 15:56 )  PTT:27.0 sec      RADIOLOGY & ADDITIONAL TESTS:

## 2022-11-03 NOTE — ED ADULT TRIAGE NOTE - CHIEF COMPLAINT QUOTE
Per friend, patient has had decreased food and fluid intake, fall a few weeks ago, difficulty walking and standing.

## 2022-11-03 NOTE — ED ADULT NURSE NOTE - OTHER COMPLAINTS
Friend also reports patient drinks alcohol daily, reports last drink was yesterday. Friend reports patient has been urinating on herself.

## 2022-11-03 NOTE — ED PROVIDER NOTE - ATTENDING APP SHARED VISIT CONTRIBUTION OF CARE
Pt is a 61yo f, poor historian, h/o etoh abuse, bib friend for ams, difficulty ambulating, not caring for herself x 2 wks. Pt fell 1 mo ago and had lac to head, unsure if pt sought medical attn. Pt fell again 1 wk ago, found by friend on the ground. Pt refused to seek medical care again. Noted to be confused since yesterday, with difficulty walking. Friend has been giving pt 1-2 beers daily to prevent etoh w/drawal, last drink this morning. + chronic hip pain. + dizziness x 1 month. + mild tachycardia to 108, vs otherwise stable. PE as above. Pt is a&o x 2, no tongue fasiculations, or tremors. + unsteady on feet and unable to ambulate. Motor/ sensation intact and equal b/l. EKG showing nsr w/ diffuse twi, no u waves. Labs remarkable for hypokalemia to 2.8, mg 1.4. Neg etoh level. No signs of etoh w/drawal at this time. Plan for ct head, cta h/n, ct c-spine, k and mag repletion, ivf. Anticipate admission for further management.

## 2022-11-03 NOTE — CONSULT NOTE ADULT - ATTENDING COMMENTS
Chely Farley  [id number]  This is a 61 y/o female with a h/o alcohol use, bedbound x 5 days, , decreased appetite, fell at home, has AMS with cognitive changes, (+) weakness on the RLE. K 2.8 now 3.8  -AMS  -s/p fall a home  -LE weakness  -dehydration  -electrolyte abnormality  -sacral ulcer  >CT head, CT spine, neuro consultation, acyclovir empirically  >Please see the resident's note for the rest of the details as d/w her.

## 2022-11-03 NOTE — ED PROVIDER NOTE - PROGRESS NOTE DETAILS
sonja Liu [place] called, given update and on her way from [place].  can be reached at [phone] pt increasingly agitated. ? wernickes encephalopathy.  will give thiamine/ativan.  do not suspect w/d.  ICU consulted, requesting also d/w neuro (will page).  pt on one to one friend reported to ICU team bowel/bladder incontinence after fall, will add on CT lumbar/thoracic spine imaging. d/w neuro Dr. Lane, he will pass information along to c/s resident who arrives at 7pm and will eval pt in ED Teresa- friend reported to ICU team bowel/bladder incontinence after reported fall, will add on CT lumbar/thoracic spine imaging. normal rectal tone on exam, rectal temp 96.9  spending imaging, ICU and neuro c/s.  signed out to Dr. Palm Dr. Palm-    Received sign out from Dr. Reyes and ANDREE Moore pending ICU final recs. Signed out to Dr. Ca.

## 2022-11-04 DIAGNOSIS — F10.939 ALCOHOL USE, UNSPECIFIED WITH WITHDRAWAL, UNSPECIFIED: ICD-10-CM

## 2022-11-04 DIAGNOSIS — R00.0 TACHYCARDIA, UNSPECIFIED: ICD-10-CM

## 2022-11-04 DIAGNOSIS — Z51.5 ENCOUNTER FOR PALLIATIVE CARE: ICD-10-CM

## 2022-11-04 DIAGNOSIS — Z71.89 OTHER SPECIFIED COUNSELING: ICD-10-CM
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
## 2022-11-04 DIAGNOSIS — G93.40 ENCEPHALOPATHY, UNSPECIFIED: ICD-10-CM

## 2022-11-04 DIAGNOSIS — R29.898 OTHER SYMPTOMS AND SIGNS INVOLVING THE MUSCULOSKELETAL SYSTEM: ICD-10-CM

## 2022-11-04 DIAGNOSIS — F32.9 MAJOR DEPRESSIVE DISORDER, SINGLE EPISODE, UNSPECIFIED: ICD-10-CM

## 2022-11-04 DIAGNOSIS — N39.0 URINARY TRACT INFECTION, SITE NOT SPECIFIED: ICD-10-CM

## 2022-11-04 DIAGNOSIS — Z29.9 ENCOUNTER FOR PROPHYLACTIC MEASURES, UNSPECIFIED: ICD-10-CM

## 2022-11-04 LAB
ALBUMIN SERPL ELPH-MCNC: 3.5 G/DL — SIGNIFICANT CHANGE UP (ref 3.3–5)
ALP SERPL-CCNC: 78 U/L — SIGNIFICANT CHANGE UP (ref 40–120)
ALT FLD-CCNC: 12 U/L — SIGNIFICANT CHANGE UP (ref 10–45)
AMPHET UR-MCNC: NEGATIVE — SIGNIFICANT CHANGE UP
ANION GAP SERPL CALC-SCNC: 8 MMOL/L — SIGNIFICANT CHANGE UP (ref 5–17)
APPEARANCE UR: ABNORMAL
AST SERPL-CCNC: 22 U/L — SIGNIFICANT CHANGE UP (ref 10–40)
BACTERIA # UR AUTO: PRESENT /HPF
BARBITURATES UR SCN-MCNC: NEGATIVE — SIGNIFICANT CHANGE UP
BASOPHILS # BLD AUTO: 0.06 K/UL — SIGNIFICANT CHANGE UP (ref 0–0.2)
BASOPHILS NFR BLD AUTO: 0.7 % — SIGNIFICANT CHANGE UP (ref 0–2)
BENZODIAZ UR-MCNC: NEGATIVE — SIGNIFICANT CHANGE UP
BILIRUB SERPL-MCNC: 0.3 MG/DL — SIGNIFICANT CHANGE UP (ref 0.2–1.2)
BILIRUB UR-MCNC: NEGATIVE — SIGNIFICANT CHANGE UP
BUN SERPL-MCNC: 4 MG/DL — LOW (ref 7–23)
CALCIUM SERPL-MCNC: 9.2 MG/DL — SIGNIFICANT CHANGE UP (ref 8.4–10.5)
CHLORIDE SERPL-SCNC: 104 MMOL/L — SIGNIFICANT CHANGE UP (ref 96–108)
CO2 SERPL-SCNC: 28 MMOL/L — SIGNIFICANT CHANGE UP (ref 22–31)
COCAINE METAB.OTHER UR-MCNC: NEGATIVE — SIGNIFICANT CHANGE UP
COLOR SPEC: YELLOW — SIGNIFICANT CHANGE UP
COMMENT - URINE: SIGNIFICANT CHANGE UP
CREAT SERPL-MCNC: 0.65 MG/DL — SIGNIFICANT CHANGE UP (ref 0.5–1.3)
DIFF PNL FLD: ABNORMAL
EGFR: 99 ML/MIN/1.73M2 — SIGNIFICANT CHANGE UP
EOSINOPHIL # BLD AUTO: 0.08 K/UL — SIGNIFICANT CHANGE UP (ref 0–0.5)
EOSINOPHIL NFR BLD AUTO: 0.9 % — SIGNIFICANT CHANGE UP (ref 0–6)
EPI CELLS # UR: SIGNIFICANT CHANGE UP /HPF (ref 0–5)
GLUCOSE SERPL-MCNC: 94 MG/DL — SIGNIFICANT CHANGE UP (ref 70–99)
GLUCOSE UR QL: NEGATIVE — SIGNIFICANT CHANGE UP
HCT VFR BLD CALC: 37.8 % — SIGNIFICANT CHANGE UP (ref 34.5–45)
HCV AB S/CO SERPL IA: 0.04 S/CO — SIGNIFICANT CHANGE UP
HCV AB SERPL-IMP: SIGNIFICANT CHANGE UP
HGB BLD-MCNC: 12.7 G/DL — SIGNIFICANT CHANGE UP (ref 11.5–15.5)
IMM GRANULOCYTES NFR BLD AUTO: 0.4 % — SIGNIFICANT CHANGE UP (ref 0–0.9)
KETONES UR-MCNC: NEGATIVE — SIGNIFICANT CHANGE UP
LEUKOCYTE ESTERASE UR-ACNC: ABNORMAL
LYMPHOCYTES # BLD AUTO: 1.44 K/UL — SIGNIFICANT CHANGE UP (ref 1–3.3)
LYMPHOCYTES # BLD AUTO: 15.8 % — SIGNIFICANT CHANGE UP (ref 13–44)
MAGNESIUM SERPL-MCNC: 1.4 MG/DL — LOW (ref 1.6–2.6)
MCHC RBC-ENTMCNC: 33.6 GM/DL — SIGNIFICANT CHANGE UP (ref 32–36)
MCHC RBC-ENTMCNC: 35 PG — HIGH (ref 27–34)
MCV RBC AUTO: 104.1 FL — HIGH (ref 80–100)
METHADONE UR-MCNC: NEGATIVE — SIGNIFICANT CHANGE UP
MONOCYTES # BLD AUTO: 0.56 K/UL — SIGNIFICANT CHANGE UP (ref 0–0.9)
MONOCYTES NFR BLD AUTO: 6.1 % — SIGNIFICANT CHANGE UP (ref 2–14)
NEUTROPHILS # BLD AUTO: 6.95 K/UL — SIGNIFICANT CHANGE UP (ref 1.8–7.4)
NEUTROPHILS NFR BLD AUTO: 76.1 % — SIGNIFICANT CHANGE UP (ref 43–77)
NITRITE UR-MCNC: POSITIVE
NRBC # BLD: 0 /100 WBCS — SIGNIFICANT CHANGE UP (ref 0–0)
OPIATES UR-MCNC: NEGATIVE — SIGNIFICANT CHANGE UP
PCP SPEC-MCNC: SIGNIFICANT CHANGE UP
PCP UR-MCNC: NEGATIVE — SIGNIFICANT CHANGE UP
PH UR: 5.5 — SIGNIFICANT CHANGE UP (ref 5–8)
PHOSPHATE SERPL-MCNC: 2.7 MG/DL — SIGNIFICANT CHANGE UP (ref 2.5–4.5)
PLATELET # BLD AUTO: 149 K/UL — LOW (ref 150–400)
POTASSIUM SERPL-MCNC: 4.2 MMOL/L — SIGNIFICANT CHANGE UP (ref 3.5–5.3)
POTASSIUM SERPL-SCNC: 4.2 MMOL/L — SIGNIFICANT CHANGE UP (ref 3.5–5.3)
PROT SERPL-MCNC: 6.4 G/DL — SIGNIFICANT CHANGE UP (ref 6–8.3)
PROT UR-MCNC: NEGATIVE MG/DL — SIGNIFICANT CHANGE UP
RBC # BLD: 3.63 M/UL — LOW (ref 3.8–5.2)
RBC # FLD: 12.4 % — SIGNIFICANT CHANGE UP (ref 10.3–14.5)
RBC CASTS # UR COMP ASSIST: < 5 /HPF — SIGNIFICANT CHANGE UP
SODIUM SERPL-SCNC: 140 MMOL/L — SIGNIFICANT CHANGE UP (ref 135–145)
SP GR SPEC: 1.01 — SIGNIFICANT CHANGE UP (ref 1–1.03)
THC UR QL: NEGATIVE — SIGNIFICANT CHANGE UP
TSH SERPL-MCNC: 1.71 UIU/ML — SIGNIFICANT CHANGE UP (ref 0.27–4.2)
UROBILINOGEN FLD QL: 0.2 E.U./DL — SIGNIFICANT CHANGE UP
WBC # BLD: 9.13 K/UL — SIGNIFICANT CHANGE UP (ref 3.8–10.5)
WBC # FLD AUTO: 9.13 K/UL — SIGNIFICANT CHANGE UP (ref 3.8–10.5)
WBC UR QL: > 10 /HPF

## 2022-11-04 PROCEDURE — 99222 1ST HOSP IP/OBS MODERATE 55: CPT

## 2022-11-04 PROCEDURE — 93010 ELECTROCARDIOGRAM REPORT: CPT

## 2022-11-04 PROCEDURE — 72128 CT CHEST SPINE W/O DYE: CPT | Mod: 26

## 2022-11-04 PROCEDURE — 99233 SBSQ HOSP IP/OBS HIGH 50: CPT | Mod: GC

## 2022-11-04 PROCEDURE — 72131 CT LUMBAR SPINE W/O DYE: CPT | Mod: 26

## 2022-11-04 RX ORDER — FOLIC ACID 0.8 MG
1 TABLET ORAL DAILY
Refills: 0 | Status: DISCONTINUED | OUTPATIENT
Start: 2022-11-04 | End: 2022-11-12

## 2022-11-04 RX ORDER — THIAMINE MONONITRATE (VIT B1) 100 MG
500 TABLET ORAL DAILY
Refills: 0 | Status: DISCONTINUED | OUTPATIENT
Start: 2022-11-04 | End: 2022-11-06

## 2022-11-04 RX ORDER — CEFTRIAXONE 500 MG/1
1000 INJECTION, POWDER, FOR SOLUTION INTRAMUSCULAR; INTRAVENOUS EVERY 24 HOURS
Refills: 0 | Status: COMPLETED | OUTPATIENT
Start: 2022-11-04 | End: 2022-11-06

## 2022-11-04 RX ORDER — ACETAMINOPHEN 500 MG
650 TABLET ORAL EVERY 6 HOURS
Refills: 0 | Status: DISCONTINUED | OUTPATIENT
Start: 2022-11-04 | End: 2022-11-12

## 2022-11-04 RX ORDER — LANOLIN ALCOHOL/MO/W.PET/CERES
3 CREAM (GRAM) TOPICAL AT BEDTIME
Refills: 0 | Status: DISCONTINUED | OUTPATIENT
Start: 2022-11-04 | End: 2022-11-12

## 2022-11-04 RX ORDER — ONDANSETRON 8 MG/1
4 TABLET, FILM COATED ORAL EVERY 8 HOURS
Refills: 0 | Status: DISCONTINUED | OUTPATIENT
Start: 2022-11-04 | End: 2022-11-12

## 2022-11-04 RX ORDER — ENOXAPARIN SODIUM 100 MG/ML
40 INJECTION SUBCUTANEOUS EVERY 24 HOURS
Refills: 0 | Status: DISCONTINUED | OUTPATIENT
Start: 2022-11-04 | End: 2022-11-12

## 2022-11-04 RX ORDER — MAGNESIUM SULFATE 500 MG/ML
2 VIAL (ML) INJECTION
Refills: 0 | Status: COMPLETED | OUTPATIENT
Start: 2022-11-04 | End: 2022-11-04

## 2022-11-04 RX ADMIN — Medication 20 MILLIEQUIVALENT(S): at 01:06

## 2022-11-04 RX ADMIN — CEFTRIAXONE 100 MILLIGRAM(S): 500 INJECTION, POWDER, FOR SOLUTION INTRAMUSCULAR; INTRAVENOUS at 06:30

## 2022-11-04 RX ADMIN — Medication 650 MILLIGRAM(S): at 09:04

## 2022-11-04 RX ADMIN — Medication 1 MILLIGRAM(S): at 13:06

## 2022-11-04 RX ADMIN — ENOXAPARIN SODIUM 40 MILLIGRAM(S): 100 INJECTION SUBCUTANEOUS at 06:30

## 2022-11-04 RX ADMIN — Medication 25 GRAM(S): at 18:49

## 2022-11-04 RX ADMIN — Medication 105 MILLIGRAM(S): at 13:06

## 2022-11-04 RX ADMIN — Medication 650 MILLIGRAM(S): at 09:35

## 2022-11-04 RX ADMIN — Medication 25 GRAM(S): at 20:00

## 2022-11-04 NOTE — H&P ADULT - ASSESSMENT
62F poor historian PMHx of EtoH abuse (6 beers daily), MDD bib friend/roommate for AMS, difficulty ambulating and inability of caring for herself s/p recent falls, admitted for encephalopathy and inability to ambulate 2/2 LE weakness.  62F poor historian PMHx of EtoH abuse (6 beers daily), MDD bib friend/roommate for AMS, difficulty ambulating and inability of caring for herself s/p recent falls, admitted for encephalopathy and inability to ambulate 2/2 LE weakness, found with UTI

## 2022-11-04 NOTE — H&P ADULT - NSHPSOCIALHISTORY_GEN_ALL_CORE
Patient lives with a friend/ roommate that acts like a caregiver.   Patient with long standing history of alcohol use disorder, unsure how much nor for how long she has been a drinker. As per friend she lately was having 6 beers a day, and in the last days was giving 1-2 beers in order to prevent withdrawal.  Current smoker  Not sure of recreational drug use hx

## 2022-11-04 NOTE — PATIENT PROFILE ADULT - FUNCTIONAL ASSESSMENT - BASIC MOBILITY 5.
----- Message from SOPHIA Winters sent at 6/7/2021  9:17 AM CDT -----  Call with normal xray    2 = A lot of assistance

## 2022-11-04 NOTE — H&P ADULT - PROBLEM SELECTOR PLAN 3
-CTH/ c-spine   -c-color   -CT T/L spine   -Neuro consult   -B12, folate, TSH Per friend, pt with LE weakness which was not appreciated on exam  - Follow up CT T/L spine   - Neuro consult   - B12, folate, TSH Per friend, pt with LE weakness   - Follow up CT T/L spine   - Neuro consult   - B12, folate, TSH Pt reports dysuria  UA positive  - treat with CTX x 3days

## 2022-11-04 NOTE — DIETITIAN INITIAL EVALUATION ADULT - ADD RECOMMEND
1. Continue regular diet. Monitor PO intake, need for ONS. [place] food preferences as able 2. Continue thiamin, folic acid. Please add vit C, Zonc oxide to improve skin integrity. Team paged. Pending verification. 3. Monitor labs (lytes, BG), GI distress, pain, skin integrity 4. Nutrition education PRN.

## 2022-11-04 NOTE — DIETITIAN INITIAL EVALUATION ADULT - PERTINENT LABORATORY DATA
11-03    139  |  104  |  6<L>  ----------------------------<  96  3.8   |  27  |  0.63    Ca    8.6      03 Nov 2022 21:44  Mg     1.9     11-03    TPro  6.7  /  Alb  3.7  /  TBili  0.3  /  DBili  x   /  AST  20  /  ALT  11  /  AlkPhos  74  11-03

## 2022-11-04 NOTE — DIETITIAN NUTRITION RISK NOTIFICATION - TREATMENT: THE FOLLOWING DIET HAS BEEN RECOMMENDED
1. Continue regular diet.   Monitor PO intake, need for ONS.   [place] food preferences as able   2. Continue thiamin, folic acid.   >>Please add vit C, Zonc oxide to improve skin integrity. Team paged. Pending verification.   3. Monitor labs (lytes, BG), GI distress, pain, skin integrity   4. Nutrition education PRN.  cognitive limitations

## 2022-11-04 NOTE — H&P ADULT - PROBLEM SELECTOR PLAN 8
F: PO  E: replete PRN, closely monitor BMP + Mg/Phos BID  Diet: NPO given AMS  DVT PPx: lovenox  GI PPx: none  Code: FULL  Dispo: [facility]

## 2022-11-04 NOTE — H&P ADULT - PROBLEM SELECTOR PLAN 7
F: PO  E: replete PRN, closely monitor BMP + Mg/Phos BID  Diet: NPO given AMS  DVT PPx: lovenox  GI PPx: none  Code: FULL  Dispo: [facility] - Med rec in the AM

## 2022-11-04 NOTE — DIETITIAN NUTRITION RISK NOTIFICATION - UPON NUTRITIONAL ASSESSMENT BY THE REGISTERED DIETITIAN YOUR PATIENT WAS DETERMINED TO MEET CRITERIA/HAS EVIDENCE OF THE FOLLOWING DIAGNOSIS:
- Home Lovenox held  - Heparin increased to 30471 BID subcutaneous with PTT of 39 this AM. Hold here, will consider [id number] tomorrow.  - Protamine 100mg PRN ordered  - 4u PRBC/4u FFP/1u cyro held. Renew q72 hours, next due 3/20.  - LLE U/S negative for DVT   Mild protein-calorie malnutrition

## 2022-11-04 NOTE — PROGRESS NOTE ADULT - SUBJECTIVE AND OBJECTIVE BOX
INTERVAL HPI/OVERNIGHT EVENTS:  Patient was seen and examined at bedside. Patient initially very drowsy and unresponsive to question. On reexamination 1 hour later, patient awake and alert. No complaints at this time. Patient denies: fever, chills, lightheadedness, weakness, CP, palpitations, SOB, cough, N/V. ROS otherwise negative.    VITAL SIGNS:  T(F): 97.4 (22 @ 05:55)  HR: 68 (22 @ 05:55)  BP: 153/99 (22 @ 05:55)  RR: 19 (22 @ 05:55)  SpO2: 100% (22 @ 05:55)  Wt(kg): --        PHYSICAL EXAM:    Constitutional: resting comfortably in bed; NAD  HEENT: NC/AT, PER, anicteric sclera, no nasal discharge; MMM  Neck: supple; no JVD or thyromegaly  Respiratory: CTA B/L; no W/R/R, no retractions  Cardiac: +S1/S2; RRR; no M/R/G  Gastrointestinal: soft, NT/ND; no rebound or guarding  Back: spine midline, no bony tenderness or step-offs; no CVAT B/L  Extremities: WWP, no clubbing or cyanosis; no peripheral edema  Musculoskeletal: NROM x4; no joint swelling, tenderness or erythema  Vascular: 2+ radial, DP/PT pulses B/L  Dermatologic: skin warm, dry and intact; no rashes, wounds, or scars  Neurologic: AAOx3; CNII-XII grossly intact; no focal deficits  Psychiatric: affect and characteristics of appearance, verbalizations, behaviors are appropriate    MEDICATIONS  (STANDING):  cefTRIAXone   IVPB 1000 milliGRAM(s) IV Intermittent every 24 hours  enoxaparin Injectable 40 milliGRAM(s) SubCutaneous every 24 hours  folic acid 1 milliGRAM(s) Oral daily  thiamine IVPB 500 milliGRAM(s) IV Intermittent daily    MEDICATIONS  (PRN):  acetaminophen     Tablet .. 650 milliGRAM(s) Oral every 6 hours PRN Temp greater or equal to 38C (100.4F), Mild Pain (1 - 3)  aluminum hydroxide/magnesium hydroxide/simethicone Suspension 30 milliLiter(s) Oral every 4 hours PRN Dyspepsia  LORazepam   Injectable 2 milliGRAM(s) IV Push every 1 hour PRN CIWA-Ar score 8 or greater  melatonin 3 milliGRAM(s) Oral at bedtime PRN Insomnia  ondansetron Injectable 4 milliGRAM(s) IV Push every 8 hours PRN Nausea and/or Vomiting      Allergies    No Known Allergies    Intolerances        LABS:                        13.5   9.77  )-----------( 143      ( 2022 15:56 )             39.3     11-03    139  |  104  |  6<L>  ----------------------------<  96  3.8   |  27  |  0.63    Ca    8.6      2022 21:44  Mg     1.9     11-03    TPro  6.7  /  Alb  3.7  /  TBili  0.3  /  DBili  x   /  AST  20  /  ALT  11  /  AlkPhos  74  11-03    PT/INR - ( 2022 15:56 )   PT: 11.8 sec;   INR: 0.99          PTT - ( 2022 15:56 )  PTT:27.0 sec  Urinalysis Basic - ( 2022 01:28 )    Color: Yellow / Appearance: Cloudy / S.010 / pH: x  Gluc: x / Ketone: NEGATIVE  / Bili: Negative / Urobili: 0.2 E.U./dL   Blood: x / Protein: NEGATIVE mg/dL / Nitrite: POSITIVE   Leuk Esterase: Moderate / RBC: < 5 /HPF / WBC > 10 /HPF   Sq Epi: x / Non Sq Epi: 0-5 /HPF / Bacteria: Present /HPF        RADIOLOGY & ADDITIONAL TESTS:  Reviewed INTERVAL HPI/OVERNIGHT EVENTS:  Patient was seen and examined at bedside. Patient initially very drowsy and unresponsive to question. On reexamination 1 hour later, patient awake and alert. No complaints at this time. Patient denies: fever, chills, lightheadedness, weakness, CP, palpitations, SOB, cough, N/V. ROS otherwise negative.    VITAL SIGNS:  T(F): 97.4 (22 @ 05:55)  HR: 68 (22 @ 05:55)  BP: 153/99 (22 @ 05:55)  RR: 19 (22 @ 05:55)  SpO2: 100% (22 @ 05:55)  Wt(kg): --        PHYSICAL EXAM:    Constitutional: thin elderly female resting comfortably in bed; NAD  HEENT: NC/AT, PER, anicteric sclera, no nasal discharge; dry MM  Neck: supple; no JVD or thyromegaly, no lymphadenopathy  Respiratory: CTA B/L; no W/R/R, no retractions  Cardiac: +S1/S2; RRR; no M/R/G  Gastrointestinal: soft, NT/ND; no rebound or guarding  Back: spine midline, no bony tenderness, stage 3 sacral ulcer  Extremities: WWP, no clubbing or cyanosis; no peripheral edema  Musculoskeletal: NROM x4; no joint swelling, tenderness or erythema. Normal  strength. 3/5 muscle strength b/l LE  Vascular: 2+ radial, DP/PT pulses B/L  Neurologic: AAOx2; Can move all extremities. No observed focal deficits. Sensation intact throughout.   Psychiatric: affect and characteristics of appearance, verbalizations, behaviors are appropriate. Mild-moderate confusion    MEDICATIONS  (STANDING):  cefTRIAXone   IVPB 1000 milliGRAM(s) IV Intermittent every 24 hours  enoxaparin Injectable 40 milliGRAM(s) SubCutaneous every 24 hours  folic acid 1 milliGRAM(s) Oral daily  thiamine IVPB 500 milliGRAM(s) IV Intermittent daily    MEDICATIONS  (PRN):  acetaminophen     Tablet .. 650 milliGRAM(s) Oral every 6 hours PRN Temp greater or equal to 38C (100.4F), Mild Pain (1 - 3)  aluminum hydroxide/magnesium hydroxide/simethicone Suspension 30 milliLiter(s) Oral every 4 hours PRN Dyspepsia  LORazepam   Injectable 2 milliGRAM(s) IV Push every 1 hour PRN CIWA-Ar score 8 or greater  melatonin 3 milliGRAM(s) Oral at bedtime PRN Insomnia  ondansetron Injectable 4 milliGRAM(s) IV Push every 8 hours PRN Nausea and/or Vomiting      Allergies    No Known Allergies    Intolerances        LABS:                        13.5   9.77  )-----------( 143      ( 2022 15:56 )             39.3     11-03    139  |  104  |  6<L>  ----------------------------<  96  3.8   |  27  |  0.63    Ca    8.6      2022 21:44  Mg     1.9     11-03    TPro  6.7  /  Alb  3.7  /  TBili  0.3  /  DBili  x   /  AST  20  /  ALT  11  /  AlkPhos  74  11-03    PT/INR - ( 2022 15:56 )   PT: 11.8 sec;   INR: 0.99          PTT - ( 2022 15:56 )  PTT:27.0 sec  Urinalysis Basic - ( 2022 01:28 )    Color: Yellow / Appearance: Cloudy / S.010 / pH: x  Gluc: x / Ketone: NEGATIVE  / Bili: Negative / Urobili: 0.2 E.U./dL   Blood: x / Protein: NEGATIVE mg/dL / Nitrite: POSITIVE   Leuk Esterase: Moderate / RBC: < 5 /HPF / WBC > 10 /HPF   Sq Epi: x / Non Sq Epi: 0-5 /HPF / Bacteria: Present /HPF        RADIOLOGY & ADDITIONAL TESTS:  Reviewed

## 2022-11-04 NOTE — H&P ADULT - PROBLEM SELECTOR PLAN 6
- Med rec in the AM Patient in the 108, likely in the setting of pain +/- possible withdrawal.   -c/w to monitor  -address pain and withdrawal    #Diffuse T wave inversion   Trop negative x1, unknown cardiovascular history. As per friend not taking any medications.  -repeat EKG in the morning

## 2022-11-04 NOTE — PROGRESS NOTE ADULT - SUBJECTIVE AND OBJECTIVE BOX
NEUROLOGY CONSULT PROGRESS NOTE    INTERVAL HISTORY:      REVIEW OF SYSTEMS:  As per HPI, otherwise negative for Constitutional, Eyes, Ears/Nose/Mouth/Throat, Neck, Cardiovascular, Respiratory, Gastrointestinal, Genitourinary, Skin, Endocrine, Musculoskeletal, Psychiatric, and Hematologic/Lymphatic.    MEDICATIONS:  acetaminophen     Tablet .. 650 milliGRAM(s) Oral every 6 hours PRN  aluminum hydroxide/magnesium hydroxide/simethicone Suspension 30 milliLiter(s) Oral every 4 hours PRN  cefTRIAXone   IVPB 1000 milliGRAM(s) IV Intermittent every 24 hours  enoxaparin Injectable 40 milliGRAM(s) SubCutaneous every 24 hours  folic acid 1 milliGRAM(s) Oral daily  LORazepam   Injectable 2 milliGRAM(s) IV Push every 1 hour PRN  melatonin 3 milliGRAM(s) Oral at bedtime PRN  ondansetron Injectable 4 milliGRAM(s) IV Push every 8 hours PRN  thiamine IVPB 500 milliGRAM(s) IV Intermittent daily    VITAL SIGNS:  Vital Signs Last 24 Hrs  T(C): 36.3 (2022 05:55), Max: 36.6 (2022 14:18)  T(F): 97.4 (2022 05:55), Max: 97.9 (2022 14:18)  HR: 68 (2022 05:55) (68 - 108)  BP: 153/99 (2022 05:55) (124/79 - 153/99)  BP(mean): --  RR: 19 (2022 05:55) (16 - 19)  SpO2: 100% (2022 05:55) (99% - 100%)    Parameters below as of 2022 05:55  Patient On (Oxygen Delivery Method): room air        PHYSICAL EXAMINATION:  Constitutional: WDWN; NAD  Eyes: anicteric sclera  Cardiovascular: +S1/S2, RRR; no M/R/G  Neurologic:  - Mental Status:  AAOx3; speech is fluent with intact naming, repetition, and comprehension  - Cranial Nerves II-XII:    II:  PERRLA; visual fields are full to confrontation  III, IV, VI:  EOMI, no nystagmus  V:  facial sensation is intact in the V1-V3 distribution bilaterally.  VII:  face is symmetric with normal eye closure and smile  VIII:  hearing is intact to finger rub  IX, X:  uvula is midline and soft palate rises symmetrically  XI:  head turning and shoulder shrug are intact bilaterally  XII:  tongue protrudes in the midline  - Motor:  strength is 5/5 throughout; normal muscle bulk and tone throughout; no pronator drift  - Reflexes:  2+ and symmetric at the biceps, triceps, brachioradialis, knees, and ankles;  plantar reflexes downgoing bilaterally  - Sensory:  intact to light touch, pin prick, vibration, and joint-position sense throughout  - Coordination:  finger-nose-finger and heel-knee-shin intact without dysmetria; rapid alternating hand movements intact  - Gait:  normal steps, base, arm swing, and turning; heel and toe walking are normal; tandem gait is normal; Romberg testing is negative    LABS:                          13.5   9.77  )-----------( 143      ( 2022 15:56 )             39.3     11-03    139  |  104  |  6<L>  ----------------------------<  96  3.8   |  27  |  0.63    Ca    8.6      2022 21:44  Mg     1.9     11-03    TPro  6.7  /  Alb  3.7  /  TBili  0.3  /  DBili  x   /  AST  20  /  ALT  11  /  AlkPhos  74  11-03    PT/INR - ( 2022 15:56 )   PT: 11.8 sec;   INR: 0.99          PTT - ( 2022 15:56 )  PTT:27.0 sec  Urinalysis Basic - ( 2022 01:28 )    Color: Yellow / Appearance: Cloudy / S.010 / pH: x  Gluc: x / Ketone: NEGATIVE  / Bili: Negative / Urobili: 0.2 E.U./dL   Blood: x / Protein: NEGATIVE mg/dL / Nitrite: POSITIVE   Leuk Esterase: Moderate / RBC: < 5 /HPF / WBC > 10 /HPF   Sq Epi: x / Non Sq Epi: 0-5 /HPF / Bacteria: Present /HPF        RADIOLOGY & ADDITIONAL STUDIES:      IMPRESSION & RECOMMENDATIONS:   NEUROLOGY CONSULT PROGRESS NOTE    INTERVAL HISTORY:      REVIEW OF SYSTEMS:  As per HPI, otherwise negative for Constitutional, Eyes, Ears/Nose/Mouth/Throat, Neck, Cardiovascular, Respiratory, Gastrointestinal, Genitourinary, Skin, Endocrine, Musculoskeletal, Psychiatric, and Hematologic/Lymphatic.    MEDICATIONS:  acetaminophen     Tablet .. 650 milliGRAM(s) Oral every 6 hours PRN  aluminum hydroxide/magnesium hydroxide/simethicone Suspension 30 milliLiter(s) Oral every 4 hours PRN  cefTRIAXone   IVPB 1000 milliGRAM(s) IV Intermittent every 24 hours  enoxaparin Injectable 40 milliGRAM(s) SubCutaneous every 24 hours  folic acid 1 milliGRAM(s) Oral daily  LORazepam   Injectable 2 milliGRAM(s) IV Push every 1 hour PRN  melatonin 3 milliGRAM(s) Oral at bedtime PRN  ondansetron Injectable 4 milliGRAM(s) IV Push every 8 hours PRN  thiamine IVPB 500 milliGRAM(s) IV Intermittent daily    VITAL SIGNS:  Vital Signs Last 24 Hrs  T(C): 36.3 (2022 05:55), Max: 36.6 (2022 14:18)  T(F): 97.4 (2022 05:55), Max: 97.9 (2022 14:18)  HR: 68 (2022 05:55) (68 - 108)  BP: 153/99 (2022 05:55) (124/79 - 153/99)  BP(mean): --  RR: 19 (2022 05:55) (16 - 19)  SpO2: 100% (2022 05:55) (99% - 100%)    Parameters below as of 2022 05:55  Patient On (Oxygen Delivery Method): room air        PHYSICAL EXAMINATION:  Mental status: Lethargic, but arousable. AAOx0. States "get away from". No dysarthria, no aphasia. Follows commands to squeeze hand.  Cranial nerves: Pupils equally round, pinpoint and reactive to light, face symmetric, hearing intact to finger rub, tongue was midline.  Motor: All four extremities anti-gravity.  strength 5/5. Normal tone and bulk. No abnormal movements.    Sensation: Withdraws to painful stimuli.  Coordination: Unable to assess.  Reflexes: 2+ in bilateral UE/LE, downgoing toes bilaterally.  Gait: Narrow and slow, steady. No ataxia.   LABS:                          13.5   9.77  )-----------( 143      ( 2022 15:56 )             39.3     11-03    139  |  104  |  6<L>  ----------------------------<  96  3.8   |  27  |  0.63    Ca    8.6      2022 21:44  Mg     1.9     11-    TPro  6.7  /  Alb  3.7  /  TBili  0.3  /  DBili  x   /  AST  20  /  ALT  11  /  AlkPhos  74  11-03    PT/INR - ( 2022 15:56 )   PT: 11.8 sec;   INR: 0.99          PTT - ( 2022 15:56 )  PTT:27.0 sec  Urinalysis Basic - ( 2022 01:28 )    Color: Yellow / Appearance: Cloudy / S.010 / pH: x  Gluc: x / Ketone: NEGATIVE  / Bili: Negative / Urobili: 0.2 E.U./dL   Blood: x / Protein: NEGATIVE mg/dL / Nitrite: POSITIVE   Leuk Esterase: Moderate / RBC: < 5 /HPF / WBC > 10 /HPF   Sq Epi: x / Non Sq Epi: 0-5 /HPF / Bacteria: Present /HPF        RADIOLOGY & ADDITIONAL STUDIES:      IMPRESSION & RECOMMENDATIONS:   NEUROLOGY CONSULT PROGRESS NOTE    INTERVAL HISTORY:      REVIEW OF SYSTEMS:  As per HPI, otherwise negative for Constitutional, Eyes, Ears/Nose/Mouth/Throat, Neck, Cardiovascular, Respiratory, Gastrointestinal, Genitourinary, Skin, Endocrine, Musculoskeletal, Psychiatric, and Hematologic/Lymphatic.    MEDICATIONS:  acetaminophen     Tablet .. 650 milliGRAM(s) Oral every 6 hours PRN  aluminum hydroxide/magnesium hydroxide/simethicone Suspension 30 milliLiter(s) Oral every 4 hours PRN  cefTRIAXone   IVPB 1000 milliGRAM(s) IV Intermittent every 24 hours  enoxaparin Injectable 40 milliGRAM(s) SubCutaneous every 24 hours  folic acid 1 milliGRAM(s) Oral daily  LORazepam   Injectable 2 milliGRAM(s) IV Push every 1 hour PRN  melatonin 3 milliGRAM(s) Oral at bedtime PRN  ondansetron Injectable 4 milliGRAM(s) IV Push every 8 hours PRN  thiamine IVPB 500 milliGRAM(s) IV Intermittent daily    VITAL SIGNS:  Vital Signs Last 24 Hrs  T(C): 36.3 (2022 05:55), Max: 36.6 (2022 14:18)  T(F): 97.4 (2022 05:55), Max: 97.9 (2022 14:18)  HR: 68 (2022 05:55) (68 - 108)  BP: 153/99 (2022 05:55) (124/79 - 153/99)  BP(mean): --  RR: 19 (2022 05:55) (16 - 19)  SpO2: 100% (2022 05:55) (99% - 100%)    Parameters below as of 2022 05:55  Patient On (Oxygen Delivery Method): room air        PHYSICAL EXAMINATION:  Mental status: awake, oriented to self, states december, unable to state year (eventually states ), speech fluent, able to follow commands  Cranial nerves: Pupils equally round, pinpoint and reactive to light, face symmetric, hearing intact to finger rub, tongue was midline.  Motor: All four extremities anti-gravity.  strength 5/5. Normal tone and bulk. No abnormal movements.    Sensation: Withdraws to painful stimuli. vibration and proprioception intact to b/l LE   Coordination: Unable to assess.  Reflexes: 2+ throughout except 1+ in b/l achiles, b/l plantar mute, no clonus  Gait: patient refused to walk  LABS:                          13.5   9.77  )-----------( 143      ( 2022 15:56 )             39.3     11-03    139  |  104  |  6<L>  ----------------------------<  96  3.8   |  27  |  0.63    Ca    8.6      2022 21:44  Mg     1.9     11-    TPro  6.7  /  Alb  3.7  /  TBili  0.3  /  DBili  x   /  AST  20  /  ALT  11  /  AlkPhos  74  11-03    PT/INR - ( 2022 15:56 )   PT: 11.8 sec;   INR: 0.99          PTT - ( 2022 15:56 )  PTT:27.0 sec  Urinalysis Basic - ( 2022 01:28 )    Color: Yellow / Appearance: Cloudy / S.010 / pH: x  Gluc: x / Ketone: NEGATIVE  / Bili: Negative / Urobili: 0.2 E.U./dL   Blood: x / Protein: NEGATIVE mg/dL / Nitrite: POSITIVE   Leuk Esterase: Moderate / RBC: < 5 /HPF / WBC > 10 /HPF   Sq Epi: x / Non Sq Epi: 0-5 /HPF / Bacteria: Present /HPF        RADIOLOGY & ADDITIONAL STUDIES:      IMPRESSION & RECOMMENDATIONS:   NEUROLOGY CONSULT PROGRESS NOTE    INTERVAL HISTORY: Pt is feeling much better today but still disoriented in time and place. She manifest pain in her right lumbar portion and right knee, with tenderness to palpation    REVIEW OF SYSTEMS:  As per HPI, otherwise negative for Constitutional, Eyes, Ears/Nose/Mouth/Throat, Neck, Cardiovascular, Respiratory, Gastrointestinal, Genitourinary, Skin, Endocrine, Musculoskeletal, Psychiatric, and Hematologic/Lymphatic.    MEDICATIONS:  acetaminophen     Tablet .. 650 milliGRAM(s) Oral every 6 hours PRN  aluminum hydroxide/magnesium hydroxide/simethicone Suspension 30 milliLiter(s) Oral every 4 hours PRN  cefTRIAXone   IVPB 1000 milliGRAM(s) IV Intermittent every 24 hours  enoxaparin Injectable 40 milliGRAM(s) SubCutaneous every 24 hours  folic acid 1 milliGRAM(s) Oral daily  LORazepam   Injectable 2 milliGRAM(s) IV Push every 1 hour PRN  melatonin 3 milliGRAM(s) Oral at bedtime PRN  ondansetron Injectable 4 milliGRAM(s) IV Push every 8 hours PRN  thiamine IVPB 500 milliGRAM(s) IV Intermittent daily    VITAL SIGNS:  Vital Signs Last 24 Hrs  T(C): 36.3 (2022 05:55), Max: 36.6 (2022 14:18)  T(F): 97.4 (2022 05:55), Max: 97.9 (2022 14:18)  HR: 68 (2022 05:55) (68 - 108)  BP: 153/99 (2022 05:55) (124/79 - 153/99)  BP(mean): --  RR: 19 (2022 05:55) (16 - 19)  SpO2: 100% (2022 05:55) (99% - 100%)    Parameters below as of 2022 05:55  Patient On (Oxygen Delivery Method): room air    PHYSICAL EXAMINATION:  Mental status: awake, oriented to self, states december, unable to state year (eventually states ), speech fluent, able to follow commands  Cranial nerves: Pupils equally round, pinpoint and reactive to light, face symmetric, hearing intact to finger rub, tongue was midline.  Motor: All four extremities anti-gravity.  strength 5/5. Normal tone and bulk. No abnormal movements.    Sensation: Withdraws to painful stimuli. vibration and proprioception intact to b/l LE   Coordination: Unable to assess.  Reflexes: 2+ throughout except 1+ in b/l achiles, b/l plantar mute, no clonus  Gait: patient refused to walk    LABS:                          13.5   9.77  )-----------( 143      ( 2022 15:56 )             39.3     11-03    139  |  104  |  6<L>  ----------------------------<  96  3.8   |  27  |  0.63    Ca    8.6      2022 21:44  Mg     1.9     11-    TPro  6.7  /  Alb  3.7  /  TBili  0.3  /  DBili  x   /  AST  20  /  ALT  11  /  AlkPhos  74  11-03    PT/INR - ( 2022 15:56 )   PT: 11.8 sec;   INR: 0.99          PTT - ( 2022 15:56 )  PTT:27.0 sec  Urinalysis Basic - ( 2022 01:28 )    Color: Yellow / Appearance: Cloudy / S.010 / pH: x  Gluc: x / Ketone: NEGATIVE  / Bili: Negative / Urobili: 0.2 E.U./dL   Blood: x / Protein: NEGATIVE mg/dL / Nitrite: POSITIVE   Leuk Esterase: Moderate / RBC: < 5 /HPF / WBC > 10 /HPF   Sq Epi: x / Non Sq Epi: 0-5 /HPF / Bacteria: Present /HPF        RADIOLOGY & ADDITIONAL STUDIES:      IMPRESSION & RECOMMENDATIONS:

## 2022-11-04 NOTE — DIETITIAN INITIAL EVALUATION ADULT - OTHER INFO
62F poor historian PMHx of EtoH abuse (6 beers daily), MDD bib friend/roommate for AMS, difficulty ambulating and inability of caring for herself s/p recent falls, admitted for encephalopathy and inability to ambulate 2/2 LE weakness.  (11/4) CT lumbar showed Findings likely representing nondisplaced fracture in lower sacrum and coccyx with adjacent subcutaneous contusions.   62F poor historian PMHx of EtoH abuse (6 beers daily), MDD bib friend/roommate for AMS, difficulty ambulating and inability of caring for herself s/p recent falls, admitted for encephalopathy and inability to ambulate 2/2 LE weakness.  (11/4) CT lumbar showed Findings likely representing nondisplaced fracture in lower sacrum and coccyx with adjacent subcutaneous contusions.      Pt seen in 7WO, Breathing room air. /99. Regular diet. Pt seen eating lunch. Endorse good appetite at this time. Observed 75%PO intake. Denies chewing swallowing issues. Pt with AMS however communicative and alert. Pt reports she was in pain and not feeling well; this impaired her PO intake. Suspected PO intake <75% for the past week. She is aware of her UBW: 125 lbs past few month. CBW 114lbs; -11lbs/-8.8% significant weight loss. Physical exam observed mild fat loss to orbital, mild muscle loss to clavicle. Per ASPEN guidelines, pt meets criteria for malnutrition in the setting of acute illness. RD encourage PO intake, importance of protein. Pt verbalize understanding. Pt denies ONS, prefers to eat food. Food preferences include [place] and broccoli. No BM. Stage 2 pressure injury noted on R sacrum and excoriate areas on L buttock. Keegan: 12. See additional nutrition recs below.

## 2022-11-04 NOTE — PATIENT PROFILE ADULT - FALL HARM RISK - HARM RISK INTERVENTIONS
Assistance with ambulation/Assistance OOB with selected safe patient handling equipment/Communicate Risk of Fall with Harm to all staff/Discuss with provider need for PT consult/Monitor gait and stability/Reinforce activity limits and safety measures with patient and family/Tailored Fall Risk Interventions/Visual Cue: Yellow wristband and red socks/Bed in lowest position, wheels locked, appropriate side rails in place/Call bell, personal items and telephone in reach/Instruct patient to call for assistance before getting out of bed or chair/Non-slip footwear when patient is out of bed/[place] to call system/Physically safe environment - no spills, clutter or unnecessary equipment/Purposeful Proactive Rounding/Room/bathroom lighting operational, light cord in reach

## 2022-11-04 NOTE — DIETITIAN INITIAL EVALUATION ADULT - OTHER CALCULATIONS
ActualBW used for calculations as pt between % of IBW (95%). Adjusted for pressure ulcer needs in elderly adult.

## 2022-11-04 NOTE — PROGRESS NOTE ADULT - PROBLEM SELECTOR PLAN 1
Metabolic vs Toxic  DDX: Infx (HSV encephalopathy vs UTI confirmed on UA) vs Toxin/Medication induced (patient unaware of home meds or pharmacy. No meds in surescripts.)  Pt w/ history of MDD per friend. Ddx includes Wernecke/Korsakoff encephalopathy in the setting of chronic EtOH use   CT Head reviewed, showed diffuse cerebral volume loss and extensive white matter changes  CTA H/N reviewed, showed no LVO or stenosis  utox negative    - Follow up HSV 1/2, VDRL, HIV, hep C  - Treat UTI with CTX x3 days  - Thiamine 500 mg IV x 5 day  - Follow up neurology recs

## 2022-11-04 NOTE — PROGRESS NOTE ADULT - PROBLEM SELECTOR PLAN 4
Per friend, pt with progressive LE weakness. CT T/L negative for any acute pathology    - Neuro consulted  - B12, folate, TSH

## 2022-11-04 NOTE — PHYSICAL THERAPY INITIAL EVALUATION ADULT - PERTINENT HX OF CURRENT PROBLEM, REHAB EVAL
61 yo F poor historian PMHx of EtoH abuse (6 beers daily), MDD brought by friend/roommate for AMS, difficulty ambulating with recent falls, and inability of caring for herself. As per friend patient fell 1 month ago, with trauma to the head but unsure if the patient seeked medical attention at that time. Friend noted that last Sunday he found pt on the floor of her apartment (thinks she fell but not sure) pt did not let him help stating she just wanted to stretch so he left for work but when he returned 6+ hrs later she had not moved from that position. At that time he picked her up and placed on bed and since that event pt has been predominantly bedbound as not able to move 2/2 LE weakness.     Friend states since the weekend pt has been progressively altered, not eating or taking care of herself. He has been giving her approx 1-2 beers daily to "prevent alcohol withdrawal", last drink on 11/3 AM. Friend also reports patient has been confused and altered saying her mother is still alive, and saying people are around the house although there is none for the past 2 weeks. Friend unsure if patient has history of previous alcohol withdrawals, hospitalizations, seizures, but does report she has been doing cocaine "occasionally" as per the friend and she smokes cigarettes 1 pack per day. Per friend a month ago pt was AOX3 and able of caring for herself.     Pt reports having burning with urination, doesn't remember when symptoms started. Denies f/c, headache, neck/back pain, chest pain, sob, urinary/bowel incontinence, abd pain, nvd, numbness/weakness, paresthesia, saddle anesthesia.

## 2022-11-04 NOTE — PROGRESS NOTE ADULT - PROBLEM SELECTOR PLAN 2
Upon admission Alcohol level <10.  CIWA score 0 at the time of assessment by ICU team, s/p Ativan 1mg for agitation. For the last days friend has probably provided 1-2 beers per day to avoid withdrawal. Patient consumes 6 beers per day at baseline. Friend unsure if patient has history of seizures, intubation, previous alcohol withdrawals nor esophageal bleeds.     CIWA 0 this AM    -c/w CIWA monitoring ---> ativan 2 mg IVP q2h/PRN   -fall precautions  -NPO for aspiration precautions in the setting of altered mental status   -Thiamine 500 mg IV x 5 day  -Folic acid 100 mg PO, MV

## 2022-11-04 NOTE — PHYSICAL THERAPY INITIAL EVALUATION ADULT - GAIT DEVIATIONS NOTED, PT EVAL
Bladder non-tender and non-distended. Urine clear yellow. decreased helio/decreased step length/decreased stride length/decreased weight-shifting ability

## 2022-11-04 NOTE — PROGRESS NOTE ADULT - ATTENDING COMMENTS
62 F with PMHx of ETOH abuse (approx 6-8 beers daily, last drink 24 hours ago) brought in by friends for AMS and difficulty walking, falls.  Poor historian.   Given chronic alcohol use differential includes Wernicke's encephalopathy, peripheral neuropathy. Also found to have mod to severe lumbar stenosis, unclear if contributing as well.  Plan as above

## 2022-11-04 NOTE — PROGRESS NOTE ADULT - ASSESSMENT
62F poor historian PMHx of EtoH abuse (6 beers daily), MDD bib friend/roommate for AMS, difficulty ambulating and inability of caring for herself s/p recent falls, admitted for encephalopathy and inability to ambulate 2/2 LE weakness, found with UTI

## 2022-11-04 NOTE — CONSULT NOTE ADULT - SUBJECTIVE AND OBJECTIVE BOX
Patient is a 62y old  Female who presents with a chief complaint of       HPI:  Emergency contact/child/HCP: Alexa Pineda [phone]    *HPI obtained from friend Theo Montes [phone]*  63 yo F poor historian PMHx of EtoH abuse (6 beers daily), MDD brought by friend/roommate for AMS, difficulty ambulating with recent falls, and inability of caring for herself. As per friend patient fell 1 month ago, with trauma to the head but unsure if the patient seeked medical attention at that time. Friend noted that last  he found pt on the floor of her apartment (thinks she fell but not sure) pt did not let him help stating she just wanted to stretch so he left for work but when he returned 6+ hrs later she had not moved from that position. At that time he picked her up and placed on bed and since that event pt has been predominantly bedbound as not able to move 2/2 LE weakness.     Friend states since the weekend pt has been progressively altered, not eating or taking care of herself. He has been giving her approx 1-2 beers daily to "prevent alcohol withdrawal", last drink on 11/3 AM. Friend also reports patient has been confused and altered saying her mother is still alive, and saying people are around the house although there is none for the past 2 weeks. Friend unsure if patient has history of previous alcohol withdrawals, hospitalizations, seizures, but does report she has been doing cocaine "occasionally" as per the friend and she smokes cigarettes 1 pack per day. Per friend a month ago pt was AOX3 and able of caring for herself.     Pt reports having burning with urination, doesn't remember when symptoms started. Denies f/c, headache, neck/back pain, chest pain, sob, urinary/bowel incontinence, abd pain, nvd, numbness/weakness, paresthesia, saddle anesthesia.    ED course  VS: T 97.9, , /79, RR 18, SpO2 99% on RA  Labs: WBC 9.77, Hgb wnl, .3, PLt 143, K 2.8 --> 3.8, LFTs wnl, trop neg, alc<10  EKG: diffuse T wave inversion I, II, III, aVF, V3-V6  CT Head reviewed, showed diffuse cerebral volume loss and extensive white matter changes  CTA H/N reviewed, showed no LVO or stenosis  CT C-spine reviewed, showed multilevel degenerative disease without acute fractures  CXR showed no acute cardiopulmonary process  Interventions: Magnesium 2 mg IV x1, KCl 20 mEq IV x3 doses, KCl 40 mEq PO x 1, Lorazepam 1 mg IV x1, Thiamine 100 mg IV x1, 1L NS, IV acyclovir for 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   (2022 01:08)      PAST MEDICAL & SURGICAL HISTORY:  ETOH abuse          MEDICATIONS  (STANDING):  cefTRIAXone   IVPB 1000 milliGRAM(s) IV Intermittent every 24 hours  enoxaparin Injectable 40 milliGRAM(s) SubCutaneous every 24 hours  folic acid 1 milliGRAM(s) Oral daily  thiamine IVPB 500 milliGRAM(s) IV Intermittent daily    MEDICATIONS  (PRN):  acetaminophen     Tablet .. 650 milliGRAM(s) Oral every 6 hours PRN Temp greater or equal to 38C (100.4F), Mild Pain (1 - 3)  aluminum hydroxide/magnesium hydroxide/simethicone Suspension 30 milliLiter(s) Oral every 4 hours PRN Dyspepsia  LORazepam   Injectable 2 milliGRAM(s) IV Push every 1 hour PRN CIWA-Ar score 8 or greater  melatonin 3 milliGRAM(s) Oral at bedtime PRN Insomnia  ondansetron Injectable 4 milliGRAM(s) IV Push every 8 hours PRN Nausea and/or Vomiting           FAMILY HISTORY:    CBC Full  -  ( 2022 15:56 )  WBC Count : 9.77 K/uL  RBC Count : 3.92 M/uL  Hemoglobin : 13.5 g/dL  Hematocrit : 39.3 %  Platelet Count - Automated : 143 K/uL  Mean Cell Volume : 100.3 fl  Mean Cell Hemoglobin : 34.4 pg  Mean Cell Hemoglobin Concentration : 34.4 gm/dL  Auto Neutrophil # : 6.77 K/uL  Auto Lymphocyte # : 2.25 K/uL  Auto Monocyte # : 0.59 K/uL  Auto Eosinophil # : 0.07 K/uL  Auto Basophil # : 0.05 K/uL  Auto Neutrophil % : 69.4 %  Auto Lymphocyte % : 23.0 %  Auto Monocyte % : 6.0 %  Auto Eosinophil % : 0.7 %  Auto Basophil % : 0.5 %          139  |  104  |  6<L>  ----------------------------<  96  3.8   |  27  |  0.63    Ca    8.6      2022 21:44  Mg     1.9         TPro  6.7  /  Alb  3.7  /  TBili  0.3  /  DBili  x   /  AST  20  /  ALT  11  /  AlkPhos  74        Urinalysis Basic - ( 2022 01:28 )    Color: Yellow / Appearance: Cloudy / S.010 / pH: x  Gluc: x / Ketone: NEGATIVE  / Bili: Negative / Urobili: 0.2 E.U./dL   Blood: x / Protein: NEGATIVE mg/dL / Nitrite: POSITIVE   Leuk Esterase: Moderate / RBC: < 5 /HPF / WBC > 10 /HPF   Sq Epi: x / Non Sq Epi: 0-5 /HPF / Bacteria: Present /HPF          Radiology :     < from: Xray Chest 1 View- PORTABLE-Urgent (Xray Chest 1 View- PORTABLE-Urgent .) (22 @ 15:35) >  ACC: [id number] EXAM:  XR CHEST PORTABLE URGENT 1V                          PROCEDURE DATE:  2022          INTERPRETATION:  XR CHEST URGENT dated 11/3/2022 3:35 PM    CLINICAL INFORMATION: Female, 62 years old.  ams.    PRIOR STUDIES: None    FINDINGS:  Lines and Devices: None    Mediastinum: Normal cardiomediastinal silhouette.    Lungs: Clear lungs. No pleural effusions. No pneumothorax.    Bones/Soft Tissues: No acute abnormalities of the surrounding soft   tissues or osseous structures.Nonspecific bowel gas pattern.      IMPRESSION:  No acute cardiopulmonary process.        < from: CT Head No Cont (22 @ 19:08) >  ACC: [id number] EXAM:  CT BRAIN                          PROCEDURE DATE:  2022          INTERPRETATION:  Axial images were obtained from the skull base to the   cranial vertex without intravenous contrast. Soft tissue and bone   algorithm images were evaluated.    Clinical information: Multiple falls, confusion, dizziness.    The study is substantially degraded by patient motion.    There is diffuse cerebral volume loss without hydrocephalus. There is no   midline shift or large extracerebral collection. There is no large   parenchymal hematoma. There is extensive confluent hypodensity in the   cerebral white matter that is nonspecific, with remote lacunar infarcts   identified in the bilateral basal ganglia and thalami. There is no   displaced fracture of the skull or skull base. Included paranasal sinuses   and mastoid air cells are predominantly ventilated.    IMPRESSION:    Severely motion degraded study shows diffuse cerebral volume loss and   extensive white matter hypodensitywithout midline shift, mass effect or   large hematoma.      < from: CT Cervical Spine No Cont (22 @ 19:10) >  ACC: [id number] EXAM:  CT CERVICAL SPINE                          PROCEDURE DATE:  2022          INTERPRETATION:  A thin section axial acquisition was performed through   the cervical spine and reconstructed in the sagittal, coronal and axial   planes utilizing bone and soft tissue algorithms.  Intravenous contrast   was not administered.    Clinical information: Multiple falls.    The cervical vertebrae are normal in height with minimal retrolisthesis   of C4 on C5 that is likely degenerative. There is no fracture. There is   no prevertebral soft tissue swelling. There is multilevel degenerative   disease of the cervical disks as well as the facet and uncovertebral   joints with central disc protrusions from C3/C4 through C5/C6. The   included cervical soft tissues are grossly unremarkable.    IMPRESSION:    No fracture. Multilevel degenerative disease with central disc   protrusions, as above.        < from: CT Thoracic Spine No Cont (22 @ 01:03) >  ACC: [id number] EXAM:  CT THORACIC SPINE                          PROCEDURE DATE:  2022    ******PRELIMINARY REPORT******      ******PRELIMINARY REPORT******           INTERPRETATION:  [facility] PRELIMINARY REPORT      Initial report created on 2022 2:49:43 AM EDT  PROCEDURE INFORMATION:  Exam: CT Thoracic Spine Without Contrast  Exam date and time: 2022 12:33 AM  Age: 62 years old  Clinical indication: Recent fall    TECHNIQUE:  Imaging protocol: Computed tomography of the thoracic spine without   contrast.    COMPARISON:  CT CERVICAL SPINE 11/3/2022 6:36 PM    FINDINGS:  Bones/joints: Vertebral body heights are within normal limits. No   evidenceof  compression fracture. No evidence of acute fracture. No disc protrusion or  extrusion.    Soft tissues: Unremarkable.  Lungs: Left lung base dependent atelectasis    IMPRESSION:  Vertebral body heights are within normal limits. No evidence of   compression  fracture. No evidence of acute fracture.      < from: CT Lumbar Spine No Cont (22 @ 01:03) >    ACC: [id number] EXAM:  CT LUMBAR SPINE                          PROCEDURE DATE:  2022    ******PRELIMINARY REPORT******      ******PRELIMINARY REPORT******           INTERPRETATION:  [facility] PRELIMINARY REPORT      Initial report created on 2022 2:46:41 AM EDT  PROCEDURE INFORMATION:  Exam: CT Lumbar Spine Without Contrast  Exam date and time: 2022 12:33 AM  Age: 62 years old  Clinical indication: Recent fall        < from: CT Lumbar Spine No Cont (22 @ 01:03) >  IMPRESSION:  Findings likely representing nondisplaced fracture in lower sacrum and   coccyx  with adjacent subcutaneous contusions.  Please correlate with physical   exam and  mechanism of injury.                  Vital Signs Last 24 Hrs  T(C): 36.3 (2022 05:55), Max: 36.6 (2022 14:18)  T(F): 97.4 (2022 05:55), Max: 97.9 (2022 14:18)  HR: 68 (2022 05:55) (68 - 108)  BP: 153/99 (2022 05:55) (124/79 - 153/99)  BP(mean): --  RR: 19 (2022 05:55) (16 - 19)  SpO2: 100% (2022 05:55) (99% - 100%)    Parameters below as of 2022 05:55  Patient On (Oxygen Delivery Method): room air            REVIEW OF SYSTEMS:  per HPI         Physical Exam: 62 y o woman lying comfortably in semi Britt's position , awake , alert , no acute complaints        Neurologic Exam:    Alert and oriented to person , place , speech fluent w/o dysarthria , follows commands       Cranial Nerves:     II :                         pupils equal , round and reactive to light , visual fields intact   III/ IV/VI :              extraocular movements intact , neg nystagmus , neg ptosis  V :                        facial sensation intact , V1-3 normal  VII :                      face symmetric , no droop , normal eye closure and smile  VIII :                     hearing intact to finger rub bilaterally  IX and X :             no hoarseness , gag intact , palate/ uvula rise symmetrically  XI :                       SCM / trapezius strength intact bilateral  XII :                      no tongue deviation    Motor Exam:          Right UE:               no focal weakness ,  > 3+/5 throughout                                  Left UE:                 no focal weakness,  > 3+/5 throughout           Right LE:                no focal weakness,  > 3+/5 throughout       Left LE:                  no focal weakness,  > 3+/5 throughout          Sensation:         intact to light touch x 4 extremities                         DTR :                     biceps/brachioradialis : equal                                              patella/ankle : equal             Gait :  not tested        PM&R Impression : admitted for encephalopathy/falls/uti     1) No focal weakness     Recommendations / Plan :     1) Physical / Occupational therapy focusing on therapeutic exercises , equipment evaluation , bed mobility/transfer out of bed evaluation , progressive ambulation with assistive devices prn .    2) Disposition Plan / Recs  :  pending functional progress

## 2022-11-04 NOTE — H&P ADULT - HISTORY OF PRESENT ILLNESS
Emergency contact/child: Alexa Pineda [phone]    62F poor historian PMHx of EtoH abuse (6 beers daily), MDD bib friend/roommate for AMS, difficulty ambulating, inability of caring for herself s/p recent falls.  As per friend patient feel 1 month ago, with trauma to the head but unsure if the patient seeked medical attention at that time. Friend noted that last Sunday he found pt on the floor of her apartment (thinks she fell but not sure) pt did not let him help stating she just wanted to stretch so he left for work but when he returned 6+ hrs later she had not moved from that position. At that time he picked her up and placed on bed and since that event pt has been predominantly bedbound incontinent of bladder/bowel as not able to move 2/2 back pain and LE weakness. When asked why pt was not brought to ED for evaluation till now he said "called her family (brother) but they were not available to take pt to ED and friend could not either as he was working and had time off today so brought pt to ED today". Friend state since the weekend pt has been progressively altered, not eating or taking care of herself. He has been giving her approx 1-2 beers daily to "prevent alcohol withdrawal", last drink ~this am.Friend also reports patient has been confused and altered saying her mother is still alive, and saying people are around the house although there is none X 2wks, getting progressively worse. As per the ED provider, patient initially was AAO x 2, no tongue fasiculations, or tremors. + Unsteady on feet and unable to ambulate. ICU consulted for encephalopathy and inability to ambulate 2/2 LE weakness. Pt seen in the ED s/p Ativan 1mg IV for reported agitation in the ED. Pt AOX1 (self) at the time of assessment and unable to provide much history. Friend unsure if patient has history of previous alcohol withdrawals, hospitalizations, seizures, toxic habits outside EtOH/tobacco use, and use of daily medications at home. Per friend a month ago pt was AOX3 and able of caring for herself except bills/ iADLs, he was helping her w/ those activities.     ED course  VS: T 97.9, , /79, RR 18, SpO2 99% on RA  Labs: WBC 9.77, Hgb wnl, .3, PLt 143, K 2.8 --> 3.8, LFTs wnl, trop neg, alc<10  EKG: diffuse T wave inversion I, II, III, aVF, V3-V6  CT Head reviewed, showed diffuse cerebral volume loss and extensive white matter changes  CTA H/N reviewed, showed no LVO or stenosis  CT C-spine reviewed, showed multilevel degenerative disease without acute fractures  CXR showed no acute cardiopulmonary process  Interventions: Magnesium 2 mg IV x1, KCl 20 mEq IV x3 doses, KCl 40 mEq PO x 1, Lorazepam 1 mg IV x1, Thiamine 100 mg IV x1, 1L NS  Emergency contact/child: Alexa Pineda [phone]    *HPI obtained from friend Theo Montes [phone]*  61 yo F poor historian PMHx of EtoH abuse (6 beers daily), MDD brought by friend/roommate for AMS, difficulty ambulating with recent falls, and inability of caring for herself. As per friend patient fell 1 month ago, with trauma to the head but unsure if the patient seeked medical attention at that time. Friend noted that last Sunday he found pt on the floor of her apartment (thinks she fell but not sure) pt did not let him help stating she just wanted to stretch so he left for work but when he returned 6+ hrs later she had not moved from that position. At that time he picked her up and placed on bed and since that event pt has been predominantly bedbound as not able to move 2/2 LE weakness.     Friend states since the weekend pt has been progressively altered, not eating or taking care of herself. He has been giving her approx 1-2 beers daily to "prevent alcohol withdrawal", last drink on 11/3 AM. Friend also reports patient has been confused and altered saying her mother is still alive, and saying people are around the house although there is none for the past 2 weeks. Friend unsure if patient has history of previous alcohol withdrawals, hospitalizations, seizures, but does report she has been doing cocaine "occasionally" as per the friend and she smokes cigarettes 1 pack per day. Per friend a month ago pt was AOX3 and able of caring for herself except bills/ iADLs, he was helping her w/ those activities.     Denies f/c, headache, neck/back pain, chest pain, sob, urinary/bowel incontinence, abd pain, nvd, numbness/weakness, paresthesia, saddle anesthesia.    ED course  VS: T 97.9, , /79, RR 18, SpO2 99% on RA  Labs: WBC 9.77, Hgb wnl, .3, PLt 143, K 2.8 --> 3.8, LFTs wnl, trop neg, alc<10  EKG: diffuse T wave inversion I, II, III, aVF, V3-V6  CT Head reviewed, showed diffuse cerebral volume loss and extensive white matter changes  CTA H/N reviewed, showed no LVO or stenosis  CT C-spine reviewed, showed multilevel degenerative disease without acute fractures  CXR showed no acute cardiopulmonary process  Interventions: Magnesium 2 mg IV x1, KCl 20 mEq IV x3 doses, KCl 40 mEq PO x 1, Lorazepam 1 mg IV x1, Thiamine 100 mg IV x1, 1L NS, IV acyclovir for 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   Emergency contact/child/HCP: Alexa Pineda [phone]    *HPI obtained from friend Theo Montes [phone]*  63 yo F poor historian PMHx of EtoH abuse (6 beers daily), MDD brought by friend/roommate for AMS, difficulty ambulating with recent falls, and inability of caring for herself. As per friend patient fell 1 month ago, with trauma to the head but unsure if the patient seeked medical attention at that time. Friend noted that last Sunday he found pt on the floor of her apartment (thinks she fell but not sure) pt did not let him help stating she just wanted to stretch so he left for work but when he returned 6+ hrs later she had not moved from that position. At that time he picked her up and placed on bed and since that event pt has been predominantly bedbound as not able to move 2/2 LE weakness.     Friend states since the weekend pt has been progressively altered, not eating or taking care of herself. He has been giving her approx 1-2 beers daily to "prevent alcohol withdrawal", last drink on 11/3 AM. Friend also reports patient has been confused and altered saying her mother is still alive, and saying people are around the house although there is none for the past 2 weeks. Friend unsure if patient has history of previous alcohol withdrawals, hospitalizations, seizures, but does report she has been doing cocaine "occasionally" as per the friend and she smokes cigarettes 1 pack per day. Per friend a month ago pt was AOX3 and able of caring for herself.     Denies f/c, headache, neck/back pain, chest pain, sob, urinary/bowel incontinence, abd pain, nvd, numbness/weakness, paresthesia, saddle anesthesia.    ED course  VS: T 97.9, , /79, RR 18, SpO2 99% on RA  Labs: WBC 9.77, Hgb wnl, .3, PLt 143, K 2.8 --> 3.8, LFTs wnl, trop neg, alc<10  EKG: diffuse T wave inversion I, II, III, aVF, V3-V6  CT Head reviewed, showed diffuse cerebral volume loss and extensive white matter changes  CTA H/N reviewed, showed no LVO or stenosis  CT C-spine reviewed, showed multilevel degenerative disease without acute fractures  CXR showed no acute cardiopulmonary process  Interventions: Magnesium 2 mg IV x1, KCl 20 mEq IV x3 doses, KCl 40 mEq PO x 1, Lorazepam 1 mg IV x1, Thiamine 100 mg IV x1, 1L NS, IV acyclovir for 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   Emergency contact/child/HCP: Alexa Pineda [phone]    *HPI obtained from friend Theo Montes [phone]*  63 yo F poor historian PMHx of EtoH abuse (6 beers daily), MDD brought by friend/roommate for AMS, difficulty ambulating with recent falls, and inability of caring for herself. As per friend patient fell 1 month ago, with trauma to the head but unsure if the patient seeked medical attention at that time. Friend noted that last Sunday he found pt on the floor of her apartment (thinks she fell but not sure) pt did not let him help stating she just wanted to stretch so he left for work but when he returned 6+ hrs later she had not moved from that position. At that time he picked her up and placed on bed and since that event pt has been predominantly bedbound as not able to move 2/2 LE weakness.     Friend states since the weekend pt has been progressively altered, not eating or taking care of herself. He has been giving her approx 1-2 beers daily to "prevent alcohol withdrawal", last drink on 11/3 AM. Friend also reports patient has been confused and altered saying her mother is still alive, and saying people are around the house although there is none for the past 2 weeks. Friend unsure if patient has history of previous alcohol withdrawals, hospitalizations, seizures, but does report she has been doing cocaine "occasionally" as per the friend and she smokes cigarettes 1 pack per day. Per friend a month ago pt was AOX3 and able of caring for herself.     Pt reports having burning with urination, doesn't remember when symptoms started. Denies f/c, headache, neck/back pain, chest pain, sob, urinary/bowel incontinence, abd pain, nvd, numbness/weakness, paresthesia, saddle anesthesia.    ED course  VS: T 97.9, , /79, RR 18, SpO2 99% on RA  Labs: WBC 9.77, Hgb wnl, .3, PLt 143, K 2.8 --> 3.8, LFTs wnl, trop neg, alc<10  EKG: diffuse T wave inversion I, II, III, aVF, V3-V6  CT Head reviewed, showed diffuse cerebral volume loss and extensive white matter changes  CTA H/N reviewed, showed no LVO or stenosis  CT C-spine reviewed, showed multilevel degenerative disease without acute fractures  CXR showed no acute cardiopulmonary process  Interventions: Magnesium 2 mg IV x1, KCl 20 mEq IV x3 doses, KCl 40 mEq PO x 1, Lorazepam 1 mg IV x1, Thiamine 100 mg IV x1, 1L NS, IV acyclovir for 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

## 2022-11-04 NOTE — CONSULT NOTE ADULT - SUBJECTIVE AND OBJECTIVE BOX
HPI:  63 yo F poor historian PMHx of EtoH abuse (6 beers daily), MDD brought by friend/roommate for AMS, difficulty ambulating with recent falls, and inability of caring for herself. As per friend patient fell 1 month ago, with trauma to the head but unsure if the patient seeked medical attention at that time. Friend noted that last  he found pt on the floor of her apartment (thinks she fell but not sure) pt did not let him help stating she just wanted to stretch so he left for work but when he returned 6+ hrs later she had not moved from that position. At that time he picked her up and placed on bed and since that event pt has been predominantly bedbound as not able to move 2/2 LE weakness.     Friend states since the weekend pt has been progressively altered, not eating or taking care of herself. He has been giving her approx 1-2 beers daily to "prevent alcohol withdrawal", last drink on 11/3 AM. Friend also reports patient has been confused and altered saying her mother is still alive, and saying people are around the house although there is none for the past 2 weeks. Friend unsure if patient has history of previous alcohol withdrawals, hospitalizations, seizures, but does report she has been doing cocaine "occasionally" as per the friend and she smokes cigarettes 1 pack per day. Per friend a month ago pt was AOX3 and able of caring for herself.     Pt reports having burning with urination, doesn't remember when symptoms started. Denies f/c, headache, neck/back pain, chest pain, sob, urinary/bowel incontinence, abd pain, nvd, numbness/weakness, paresthesia, saddle anesthesia.    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   No Rx found on iStop review (Ref#:           Items that are not checked are not present  PSYCHOSOCIAL ASSESSMENT:  - Significant other/partner: [  ]  Children: [  ]  - Living Situation: Home [  ] Long term care [  ]  Rehab[  ]  Other[  ]  - Support system: strong[  ] adequate[  ] inadequate[  ]  - Scientologist/Spiritual practice:  - Role of organized Denominational important [  ] some [  ] unable to assess dt pt mentation [  ]  - Coping: well[  ]  with difficulty[  ]  poor coping[  ]  unable to assess dt pt mentation [  ]  - What is most important to patient?  - What worries patient the most?  - Is patient at peace? :     ADVANCE DIRECTIVES:    - MOLST[  ]    Living Will [  ]   DECISION MAKER(s):  - Health Care Proxy(s) [  ]  Surrogate(s)[  ] Guardian[  ]           Name(s)/Phone Number(s):   Emergency contact/child/HCP: Alexa Pineda [phone]   friend Theo Montes [phone]*    BASELINE (I)ADLs (prior to admission):  - [place]:  Total[  ] Moderate[  ] Dependent[  ]    Allergies  No Known Allergies  Intolerances        Medications:      MEDICATIONS  (STANDING):  cefTRIAXone   IVPB 1000 milliGRAM(s) IV Intermittent every 24 hours  enoxaparin Injectable 40 milliGRAM(s) SubCutaneous every 24 hours  folic acid 1 milliGRAM(s) Oral daily  thiamine IVPB 500 milliGRAM(s) IV Intermittent daily    MEDICATIONS  (PRN):  acetaminophen     Tablet .. 650 milliGRAM(s) Oral every 6 hours PRN Temp greater or equal to 38C (100.4F), Mild Pain (1 - 3)  aluminum hydroxide/magnesium hydroxide/simethicone Suspension 30 milliLiter(s) Oral every 4 hours PRN Dyspepsia  LORazepam   Injectable 2 milliGRAM(s) IV Push every 1 hour PRN CIWA-Ar score 8 or greater  melatonin 3 milliGRAM(s) Oral at bedtime PRN Insomnia  ondansetron Injectable 4 milliGRAM(s) IV Push every 8 hours PRN Nausea and/or Vomiting      PRESENT SYMPTOMS:   [ ]Unable to obtain due to poor mentation   Source if other than patient:  [ ]Family   [ ]Team     Pain [  ]  Location :        Quality:  Radiation:  Timing:  Aggravating factors:  Minimal acceptable level (0-10 scale):   Severity in last 24h (0-10 scale) :  Current score (0-10 scale):  Improves with:     PAIN AD Score:   [email or web address] (press ctrl +  left click to view)    Analgesic Use (PRNs) for past 24 hours:      If [  ], pt denies symptom.   Dyspnea:         [  ]  Anxiety:           [  ]  Difficulty sleeping: [  ]  Fatigue:           [  ]  Nausea:           [  ]  Loss of appetite:     [  ]  Dysphagia: [  ]  Constipation:   [  ]        LBM   Grief Present   [  ] Yes   [  ] No   Other Symptoms:    All other review of systems negative [  ]    ECOG Performance:       Current Palliative Performance Scale/Karnofsky Score:    %  Preadmit Karnofsky:   %          PEx:  General: alert  oriented x       lethargic, distressed, cachexia,  nonverbal,  unarousable, verbal  Behavioral: Anxiety  Delirium Agitation Cooperative  HEENT: atraumatic, No abnormal lesions, No dry mouth,  temporal wasting, ET tube/trach  RESP: Reg rhythm,   tachypnea/labored breathing,   audible excessive secretions,  CTAB, diminished bilat bases  CV: RRR, S1S2,   tachycardia  GI: soft non distended non tender  incontinent               PEG/NG/OG tube                 constipation  last BM:   : normal  incontinent  oliguria/anuria  crump  MUSK: weakness x4,  edema, ambulatory with assistance,   mostly/fully  BB/WC bound  SKIN: No abnormal skin lesions, Poor skin turgor, Pressure ulcer stage:   NEURO:  No deficits, cognitive impairment, encephalopathic dsyphagia dysarthria paresis  Oral intake ability: unable/only mouth care, minimal moderate full capability    T(C): 36.3 (22 @ 05:55), Max: 36.6 (22 @ 14:18)  HR: 68 (22 @ 05:55) (68 - 108)  BP: 153/99 (22 @ 05:55) (124/79 - 153/99)  RR: 19 (22 @ 05:55) (16 - 19)  SpO2: 100% (22 @ 05:55) (99% - 100%)  Wt(kg): --    Labs:    CBC:                        13.5   9.77  )-----------( 143      ( 2022 15:56 )             39.3     CMP:        139  |  104  |  6<L>  ----------------------------<  96  3.8   |  27  |  0.63    Ca    8.6      2022 21:44  Mg     1.9         TPro  6.7  /  Alb  3.7  /  TBili  0.3  /  DBili  x   /  AST  20  /  ALT  11  /  AlkPhos  74      PT/INR - ( 2022 15:56 )   PT: 11.8 sec;   INR: 0.99          PTT - ( 2022 15:56 )  PTT:27.0 sec   Urinalysis Basic - ( 2022 01:28 )    Color: Yellow / Appearance: Cloudy / S.010 / pH: x  Gluc: x / Ketone: NEGATIVE  / Bili: Negative / Urobili: 0.2 E.U./dL   Blood: x / Protein: NEGATIVE mg/dL / Nitrite: POSITIVE   Leuk Esterase: Moderate / RBC: < 5 /HPF / WBC > 10 /HPF   Sq Epi: x / Non Sq Epi: 0-5 /HPF / Bacteria: Present /HPF    RADIOLOGY & ADDITIONAL STUDIES:  Imaging:  Reviewed    PALLIATIVE MEDICINE COORDINATION OF CARE DOCUMENTATION: [x] Inpatient Consult  Non-Face-to-Face prolonged service provided that relates to (face-to-face) care that has or will occur and ongoing patient management, including one or more of the following: - Reviewed documentation from other physicians and other health care professional services - Reviewed medical records and diagnostic / radiology study results - Coordination with patient's support system  ************************************************************************  MEDICATION REVIEW:  - See Medication List Above    ISTOP REFERENCE and PRN usage  - as above   ------------------------------------------------------------------------  COORDINATION OF CARE:  - Palliative Care consulted for: GOC / Symptom Management  - Patient (to be) assessed:   - Patient previously seen by Palliative Care service: NO    ADVANCE CARE PLANNING  - Code status: FULL  - MOLST reviewed in chart: NONE; None found in Patient Window  - HCP/Surrogate: as above   - GOC documents: NONE found   - HCP/ Living will/Other Advanced Directives in Alpha: NONE found on Alpha  ------------------------------------------------------------------------  CARE PROVIDER DOCUMENTATION:  - SW/CM notes: Remains medically active  - Primary team and Consultant notes reviewed     PLAN OF CARE  - Known admissions to [facility] in past year:  - Current admit date:  - LOS:  - LACE score:   - Current dispo plan: TO BE DETERMINED    22 (1d)  ------------------------------------------------------------------------  - Time Spent/Chart reviewed: 31 Minutes [including time used to gather, review and transfer data]  - Start: 1122  - End: 1155    Prolonged services rendered, as part of this patient's care provided by Palliative Medicine, include: i. chart review for provider and ancillary service documentation, ii. pertinent diagnostics including laboratory and imaging studies, iii. medication review including PRN use, iv. admission history including previous palliative care encounters and GOC notes, v. advance care planning documents including HCP and MOLST forms in Alpha. Part of Palliative Medicine extended evaluation and management also involves coordination of care with our IDT, the primary and consulting teams, and unit CM/SW and Hospice if eligible. Recommendations based on the information gathered and discussed are outlined in the A/P of Palliative notes.

## 2022-11-04 NOTE — H&P ADULT - PROBLEM SELECTOR PLAN 1
Metabolic vs Toxic  DDX: ICH s/p fall vs Infx (HSV encephalopathy vs Siphilis vs UTI) vs Toxin/Medication induced (do know if pt takes meds at home) but pt w/ history of MDD per friend vs Vascular dementia vs Hydrocephalus vs Wernecke/Korsakoff encephalopathy in the setting of chronic EtOH use vs Dehydration   -Acyclovir for HSV encephalopathy  -f/u CTH, CTA H/C spine   -obtain Utox, UA w/ reflex   -obtain HSV 1/2, VDRL, HIV, hep C  -Thiamine 500 mg IV x 5 day  -Monitor FSG q6hrs  -Neurology consult Metabolic vs Toxic  DDX: Infx (HSV encephalopathy vs Syphilis vs UTI confirmed on UA) vs Toxin/Medication induced (do know if pt takes meds at home) but pt w/ history of MDD per friend vs Vascular dementia vs Wernecke/Korsakoff encephalopathy in the setting of chronic EtOH use vs Dehydration   CT Head reviewed, showed diffuse cerebral volume loss and extensive white matter changes  CTA H/N reviewed, showed no LVO or stenosis  utox negative  - Follow up HSV 1/2, VDRL, HIV, hep C  - Treat UTI with CTX x3 days  -Thiamine 500 mg IV x 5 day  -Follow up neurology consult Metabolic vs Toxic  DDX: Infx (HSV encephalopathy vs Syphilis vs UTI confirmed on UA) vs Toxin/Medication induced (do know if pt takes meds at home) but pt w/ history of MDD per friend vs Vascular dementia vs Wernecke/Korsakoff encephalopathy in the setting of chronic EtOH use vs Dehydration   CT Head reviewed, showed diffuse cerebral volume loss and extensive white matter changes  CTA H/N reviewed, showed no LVO or stenosis  utox negative  - Follow up HSV 1/2, VDRL, HIV, hep C  - Treat UTI with CTX x3 days  - Thiamine 500 mg IV x 5 day  - Follow up neurology consult

## 2022-11-04 NOTE — CONSULT NOTE ADULT - ASSESSMENT
per Internal Medicine    62F poor historian PMHx of EtoH abuse (6 beers daily), MDD bib friend/roommate for AMS, difficulty ambulating and inability of caring for herself s/p recent falls, admitted for encephalopathy and inability to ambulate 2/2 LE weakness, found with UTI    Problem/Plan - 1:  ·  Problem: Encephalopathy.   ·  Plan: Metabolic vs Toxic  DDX: Infx (HSV encephalopathy vs Syphilis vs UTI confirmed on UA) vs Toxin/Medication induced (do know if pt takes meds at home) but pt w/ history of MDD per friend vs Vascular dementia vs Wernecke/Korsakoff encephalopathy in the setting of chronic EtOH use vs Dehydration   CT Head reviewed, showed diffuse cerebral volume loss and extensive white matter changes  CTA H/N reviewed, showed no LVO or stenosis  utox negative  - Follow up HSV 1/2, VDRL, HIV, hep C  - Treat UTI with CTX x3 days  - Thiamine 500 mg IV x 5 day  - Follow up neurology consult.    Problem/Plan - 2:  ·  Problem: Alcohol withdrawal.   ·  Plan: Upon admission Alcohol level <10.  CIWA score 0 at the time of assessment by ICU team, s/p Ativan 1mg for agitation. For the last days friend has probably provided 1-2 beers per day to avoid withdrawal. Patient consumes 6 beers per day at baseline. Friend unsure if patient has history of seizures, intubation, previous alcohol withdrawals nor esophageal bleeds.   last CIWA 4 for agitation, anxiety at 4AM 11/4  -c/w CIWA monitoring ---> ativan 2 mg IVP q2h/PRN   -fall precautions  -NPO for aspiration precautions in the setting of altered mental status   -Thiamine 500 mg IV x 5 day  -Folic acid 100 mg PO, MV.    Problem/Plan - 3:  ·  Problem: UTI (urinary tract infection).   ·  Plan: Pt reports dysuria  UA positive  - treat with CTX x 3days.    Problem/Plan - 4:  ·  Problem: Lower extremity weakness.   ·  Plan: Per friend, pt with LE weakness   - Follow up CT T/L spine   - Neuro consult   - B12, folate, TSH.    Problem/Plan - 5:  ·  Problem: Fall.   ·  Plan: CTH unremarkable  -Follow up CT T/L spine   -Follow up Neuro consult   -B12, folate, TSH   -TTE to r/o aortic stenosis  -Orthostatic BP once able/pt stable  - PT consult.    Problem/Plan - 6:  ·  Problem: Tachycardia.   ·  Plan: Patient in the 108, likely in the setting of pain +/- possible withdrawal.   -c/w to monitor  -address pain and withdrawal    #Diffuse T wave inversion   Trop negative x1, unknown cardiovascular history. As per friend not taking any medications.  -repeat EKG in the morning.    Problem/Plan - 7:  ·  Problem: MDD (major depressive disorder).   ·  Plan: - Med rec in the AM.    Problem/Plan - 8:  ·  Problem: Prophylactic measure.   ·  Plan: F: PO  E: replete PRN, closely monitor BMP + Mg/Phos BID  Diet: NPO given AMS  DVT PPx: lovenox  GI PPx: none  Code: FULL  Dispo: F.

## 2022-11-04 NOTE — H&P ADULT - PROBLEM SELECTOR PLAN 2
Upon admission Alcohol level <10.  CIWA score 0 at the time of assessment by ICU team, s/p Ativan 1mg for agitation. For the last days friend has probably provided 1-2 beers per day to avoid withdrawal. Patient consumes 6 beers per day at baseline. Friend unsure if patient has history of seizures, intubation, previous alcohol withdrawals nor esophageal bleeds.   -c/w CIWA monitoring   -PPI ppx IV while NPO  -fall precautions  -NPO for aspiration precautions in the setting of altered mental status   -obtain Urine toxicology      #Alcohol use disorder  Patient with long standing history of alcohol use disorder.  -Thiamine 500 mg IV x 5 day  -Folic acid 100 mg PO, MV Upon admission Alcohol level <10.  CIWA score 0 at the time of assessment by ICU team, s/p Ativan 1mg for agitation. For the last days friend has probably provided 1-2 beers per day to avoid withdrawal. Patient consumes 6 beers per day at baseline. Friend unsure if patient has history of seizures, intubation, previous alcohol withdrawals nor esophageal bleeds.   -c/w CIWA monitoring   -fall precautions  -NPO for aspiration precautions in the setting of altered mental status   -Thiamine 500 mg IV x 5 day  -Folic acid 100 mg PO, MV Upon admission Alcohol level <10.  CIWA score 0 at the time of assessment by ICU team, s/p Ativan 1mg for agitation. For the last days friend has probably provided 1-2 beers per day to avoid withdrawal. Patient consumes 6 beers per day at baseline. Friend unsure if patient has history of seizures, intubation, previous alcohol withdrawals nor esophageal bleeds.   last CIWA 4 for agitation, anxiety at 4AM 11/4  -c/w CIWA monitoring ---> ativan 2 mg IVP q2h/PRN + standing librium 75 mg q8h (taper accordingly)  -fall precautions  -NPO for aspiration precautions in the setting of altered mental status   -Thiamine 500 mg IV x 5 day  -Folic acid 100 mg PO, MV Upon admission Alcohol level <10.  CIWA score 0 at the time of assessment by ICU team, s/p Ativan 1mg for agitation. For the last days friend has probably provided 1-2 beers per day to avoid withdrawal. Patient consumes 6 beers per day at baseline. Friend unsure if patient has history of seizures, intubation, previous alcohol withdrawals nor esophageal bleeds.   last CIWA 4 for agitation, anxiety at 4AM 11/4  -c/w CIWA monitoring ---> ativan 2 mg IVP q2h/PRN   -fall precautions  -NPO for aspiration precautions in the setting of altered mental status   -Thiamine 500 mg IV x 5 day  -Folic acid 100 mg PO, MV

## 2022-11-04 NOTE — PROGRESS NOTE ADULT - PROBLEM SELECTOR PLAN 6
Patient in the 108, likely in the setting of pain +/- possible withdrawal.   -c/w to monitor  -address pain and withdrawal    #Diffuse T wave inversion   Trop negative x1, unknown cardiovascular history. As per friend not taking any medications.  -repeat EKG in the morning

## 2022-11-04 NOTE — PROGRESS NOTE ADULT - PROBLEM SELECTOR PLAN 5
CTH unremarkable. CT T/L Negative.    -Follow up Neuro consult   -B12, folate, TSH   -TTE to r/o aortic stenosis  -Orthostatic BP once able/pt stable  - PT consult

## 2022-11-04 NOTE — H&P ADULT - PROBLEM SELECTOR PLAN 4
-CTH unremarkable  -c-color   -CT T/L spine   -Follow up Neuro consult   -B12, folate, TSH   -TTE to r/o aortic stenosis  -Orthostatic BP once able/pt stable CTH unremarkable  -Follow up CT T/L spine   -Follow up Neuro consult   -B12, folate, TSH   -TTE to r/o aortic stenosis  -Orthostatic BP once able/pt stable  - PT consult Per friend, pt with LE weakness   - Follow up CT T/L spine   - Neuro consult   - B12, folate, TSH

## 2022-11-04 NOTE — PROGRESS NOTE ADULT - ASSESSMENT
This is a 62 F with PMHx of ETOH abuse (approx 6-8 beers daily, last drink 24 hours ago) brought in by friends for AMS and difficulty walking, falls. The patient had a fall 1 month ago, fell backwards and then again 5 days ago. She is mostly bedbound or chair bound as per roommate and friend who provided history. She has been eating and drinking less as per the friend, and states her mother passed away 2 years ago and she has become increasingly withdrawn and isolated, stating she has been depressed since then. She also has been using a commode as she has had decreased mobility. CT Head showed volume loss and white matter disease. Likely failure to thrive in the setting of underling major depressive disorder vs vascular dementia vs toxic metabolic encephalopathy, patient has several electrolyte disturbances including K 2.8 and Mg 1.4. Patient received IV Acyclovir for possible underlying viral encephalitis, although patient is afebrile with no leukocytosis and no other signs of infectious process at this time.    Plan:  - CT Head reviewed, showed diffuse cerebral volume loss and extensive white matter changes  - CTA H/N reviewed, showed no LVO or stenosis  - CT C-spine reviewed, showed multilevel degenerative disease without acute fractures  - CT Lumbar, Thoracic spine pending, will f/u  - Recommend MRI Brain w/o, will f/u  - Check B12/Folate, TSH, RPR  - F/u Utox, pending  - [facility] protocol  - Continue thiamine, folate, multivitamin supplementation  - Check orthostatics  - Fall precautions  - K 2.8, received KCl 60 mEq IV, and 40 mEq po  - Mg 1.4, received Mg 2 mg IV   - Keep K >4, Mg > 2, replete PRN  - Consider Psych eval  - Can admit under Medicine for further work up  - Neurology will continue to follow  - Continue rest of care as per primary team   This is a 62 F with PMHx of ETOH abuse (approx 6-8 beers daily, last drink 24 hours ago) brought in by friends for AMS and difficulty walking, falls. The patient had a fall 1 month ago, fell backwards and then again 5 days ago. She is mostly bedbound or chair bound as per roommate and friend who provided history. She has been eating and drinking less as per the friend, and states her mother passed away 2 years ago and she has become increasingly withdrawn and isolated, stating she has been depressed since then. She also has been using a commode as she has had decreased mobility. CT Head showed volume loss and white matter disease. Likely failure to thrive in the setting of underling major depressive disorder vs vascular dementia vs toxic metabolic encephalopathy, patient has several electrolyte disturbances including K 2.8 and Mg 1.4. Patient received IV Acyclovir for possible underlying viral encephalitis, although patient is afebrile with no leukocytosis and no other signs of infectious process at this time. CT Head reviewed, showed diffuse cerebral volume loss and extensive white matter changes. CTA H/N reviewed, showed no LVO or stenosis. CT C-spine reviewed, showed multilevel degenerative disease without acute fractures  PLAN:  - CT Lumbar, Thoracic spine pending, will f/u  - Recommend MRI Brain w/o, will f/u  - Check B12/Folate, TSH, RPR  - F/u Utox, pending  - [facility] protocol  - Continue thiamine, folate, multivitamin supplementation  - Check orthostatics  - Fall precautions  - K 2.8, received KCl 60 mEq IV, and 40 mEq po  - Mg 1.4, received Mg 2 mg IV   - Keep K >4, Mg > 2, replete PRN  - Consider Psych eval  - Can admit under Medicine for further work up  - Neurology will continue to follow  - Continue rest of care as per primary team   This is a 62 F with PMHx of ETOH abuse (approx 6-8 beers daily, last drink 24 hours ago) brought in by friends for AMS and difficulty walking, falls. The patient had a fall 1 month ago, fell backwards and then again 5 days ago. She is mostly bedbound or chair bound as per roommate and friend who provided history. She has been eating and drinking less as per the friend, and states her mother passed away 2 years ago and she has become increasingly withdrawn and isolated, stating she has been depressed since then. She also has been using a commode as she has had decreased mobility. CT Head showed volume loss and white matter disease. Likely failure to thrive in the setting of underling major depressive disorder vs vascular dementia vs toxic metabolic encephalopathy, patient has several electrolyte disturbances including K 2.8 and Mg 1.4. Patient received IV Acyclovir for possible underlying viral encephalitis, although patient is afebrile with no leukocytosis and no other signs of infectious process at this time. CT Head reviewed, showed diffuse cerebral volume loss and extensive white matter changes. CTA H/N reviewed, showed no LVO or stenosis. CT C-spine reviewed, showed multilevel degenerative disease without acute fractures. CT Lumbar and Thoracic spine showed multilevel degenerative changes of the spine with moderate spinal canal stenosis L2-3 and L3-4 and moderate to severe spinal canal stenosis at L4-5. L5-S1 disc herniation contacting the descending left S1 nerve root.     PLAN:  - Recommend surgery consult for spine evaluation for possible lumbar stenosis (decubitus ulcer stage 2). Assess walk with PT  - F/u MRI Brain w/o contrast  - Check B12/Folate, TSH, RPR  - F/u Utox, pending  - [facility] protocol  - Continue thiamine, folate, multivitamin supplementation  - Check orthostatics  - Fall precautions  - Keep K >4, Mg > 2, replete PRN  - Consider Psych eval  - Can admit under Medicine for further work up  - Neurology will continue to follow during the weekend  - Continue rest of care as per primary team This is a 62 F with PMHx of ETOH abuse (approx 6-8 beers daily, last drink 24 hours ago) brought in by friends for AMS and difficulty walking, falls. The patient had a fall 1 month ago, fell backwards and then again 5 days ago. She is mostly bedbound or chair bound as per roommate and friend who provided history. She has been eating and drinking less as per the friend, and states her mother passed away 2 years ago and she has become increasingly withdrawn and isolated, stating she has been depressed since then. She also has been using a commode as she has had decreased mobility. CT Head showed volume loss and white matter disease. Likely failure to thrive in the setting of underling major depressive disorder vs vascular dementia vs toxic metabolic encephalopathy, patient has several electrolyte disturbances including K 2.8 and Mg 1.4. Patient received IV Acyclovir for possible underlying viral encephalitis, although patient is afebrile with no leukocytosis and no other signs of infectious process at this time. CT Head reviewed, showed diffuse cerebral volume loss and extensive white matter changes. CTA H/N reviewed, showed no LVO or stenosis. CT C-spine reviewed, showed multilevel degenerative disease without acute fractures. CT Lumbar and Thoracic spine showed multilevel degenerative changes of the spine with moderate spinal canal stenosis L2-3 and L3-4 and moderate to severe spinal canal stenosis at L4-5. L5-S1 disc herniation contacting the descending left S1 nerve root.     PLAN:  - Continue with thiamine iv as prescribed  - Recommend surgery consult for spine evaluation for possible lumbar stenosis (decubitus ulcer stage 2). Assess walk with PT  - F/u MRI Brain w/o contrast  - Check B12/Folate, TSH, RPR  - F/u Utox, pending  - [facility] protocol  - Continue thiamine, folate, multivitamin supplementation  - Check orthostatics  - Fall precautions  - Keep K >4, Mg > 2, replete PRN  - Consider Psych eval  - Can admit under Medicine for further work up  - Neurology will continue to follow during the weekend  - Continue rest of care as per primary team This is a 62 F with PMHx of ETOH abuse (approx 6-8 beers daily, last drink 24 hours ago) brought in by friends for AMS and difficulty walking, falls. The patient had a fall 1 month ago, fell backwards and then again 5 days ago. She is mostly bedbound or chair bound as per roommate and friend who provided history. She has been eating and drinking less as per the friend, and states her mother passed away 2 years ago and she has become increasingly withdrawn and isolated, stating she has been depressed since then. She also has been using a commode as she has had decreased mobility. CT Head showed volume loss and white matter disease. Likely failure to thrive in the setting of underling major depressive disorder vs vascular dementia vs toxic metabolic encephalopathy, patient has several electrolyte disturbances including K 2.8 and Mg 1.4. Patient received IV Acyclovir for possible underlying viral encephalitis, although patient is afebrile with no leukocytosis and no other signs of infectious process at this time. CT Head reviewed, showed diffuse cerebral volume loss and extensive white matter changes. CTA H/N reviewed, showed no LVO or stenosis. CT C-spine reviewed, showed multilevel degenerative disease without acute fractures. CT Lumbar and Thoracic spine showed multilevel degenerative changes of the spine with moderate spinal canal stenosis L2-3 and L3-4 and moderate to severe spinal canal stenosis at L4-5. L5-S1 disc herniation contacting the descending left S1 nerve root.   Found to have Stage 2 decubitus ulcer.    PLAN:  - Continue with high dose thiamine iv   - Recommend Spine eval for severe lumbar stenosis  - Check B12/Folate, TSH, RPR, thiamine level  - [facility] protocol  - Fall precautions  - Consider Psych eval  - Continue rest of care as per primary team

## 2022-11-04 NOTE — PHYSICAL THERAPY INITIAL EVALUATION ADULT - ADDITIONAL COMMENTS
Pt lives in an elevator access apartment with roommate. As per brother, patient was independent with all ADLs and IADLs prior to admission and was able to ambulate with SC independently.

## 2022-11-04 NOTE — H&P ADULT - NSHPLABSRESULTS_GEN_ALL_CORE
13.5   9.77  )-----------( 143      ( 2022 15:56 )             39.3       11-03    139  |  104  |  6<L>  ----------------------------<  96  3.8   |  27  |  0.63    Ca    8.6      2022 21:44  Mg     1.9     11-    TPro  6.7  /  Alb  3.7  /  TBili  0.3  /  DBili  x   /  AST  20  /  ALT  11  /  AlkPhos  74  11-03              Urinalysis Basic - ( 2022 01:28 )    Color: Yellow / Appearance: Cloudy / S.010 / pH: x  Gluc: x / Ketone: NEGATIVE  / Bili: Negative / Urobili: 0.2 E.U./dL   Blood: x / Protein: NEGATIVE mg/dL / Nitrite: POSITIVE   Leuk Esterase: Moderate / RBC: < 5 /HPF / WBC > 10 /HPF   Sq Epi: x / Non Sq Epi: 0-5 /HPF / Bacteria: Present /HPF        PT/INR - ( 2022 15:56 )   PT: 11.8 sec;   INR: 0.99          PTT - ( 2022 15:56 )  PTT:27.0 sec    CARDIAC MARKERS ( 2022 15:56 )  x     / 0.01 ng/mL / x     / x     / x            CAPILLARY BLOOD GLUCOSE

## 2022-11-04 NOTE — DIETITIAN INITIAL EVALUATION ADULT - NSFNSPHYEXAMSKINFT_GEN_A_CORE
Pressure Injury 1: Right:,sacrum, Stage II  Pressure Injury 2: none, none  Pressure Injury 3: none, none  Pressure Injury 4: none, none  Pressure Injury 5: none, none  Pressure Injury 6: none, none  Pressure Injury 7: none, none  Pressure Injury 8: none, none  Pressure Injury 9: none, none  Pressure Injury 10: none, none  Pressure Injury 11: none, none, Pressure Injury 1: Right:,sacrum, Stage II  Pressure Injury 2: none, none  Pressure Injury 3: none, none  Pressure Injury 4: none, none  Pressure Injury 5: none, none  Pressure Injury 6: none, none  Pressure Injury 7: none, none  Pressure Injury 8: none, none  Pressure Injury 9: none, none  Pressure Injury 10: none, none  Pressure Injury 11: none, none

## 2022-11-04 NOTE — H&P ADULT - NSHPPHYSICALEXAM_GEN_ALL_CORE
Constitutional: NAD  HEENT: PERRLA, EOMI, Normal Hearing, mildly dry MM  Neck: No LAD, No JVD  Back: Normal spine flexure, no pain upon palpation, no hematoma, stage 2 small ulcers in sacrum  Respiratory: CTA BL, no respiratory distress  Cardiovascular: S1 and S2, RRR, no M/G/R  Gastrointestinal: BS+, soft, NT/ND  Extremities: No peripheral edema; 3/5 RLE strength and 5/5 LLE strength  Vascular: 2+ peripheral pulses  Neurological: no focal deficits appreciated, moving all extremities Constitutional: NAD  HEENT: PERRLA, EOMI, Normal Hearing, mildly dry MM  Neck: No LAD, No JVD  Back: Normal spine flexure, no pain upon palpation, no hematoma, stage 2 small ulcers in sacrum  Respiratory: CTA BL, no respiratory distress  Cardiovascular: S1 and S2, RRR, no M/G/R  Gastrointestinal: BS+, soft, NT/ND  Extremities: No peripheral edema;  strength 5/5. Normal tone and bulk. No abnormal movements. 5/5 RLE strength and 5/5 LLE strength, sensation intact  Vascular: 2+ peripheral pulses  Neurological: no focal deficits appreciated, moving all extremities Constitutional: NAD  HEENT: PERRLA, EOMI, Normal Hearing, mildly dry MM  Neck: No LAD, No JVD  Back: Normal spine flexure, no pain upon palpation, no hematoma, stage 2 small ulcers in sacrum  Respiratory: CTA BL, no respiratory distress  Cardiovascular: S1 and S2, RRR, no M/G/R  Gastrointestinal: BS+, soft, NT/ND  Extremities: No peripheral edema;  strength 5/5. Normal tone and bulk. No abnormal movements. 3/5 RLE strength and 4/5 LLE strength, sensation intact  Vascular: 2+ peripheral pulses  Neurological: no focal deficits appreciated, moving all extremities Constitutional: NAD  HEENT: PERRLA, EOMI, Normal Hearing, mildly dry MM  Neck: No LAD, No JVD  Back: Normal spine flexure, no pain upon palpation, no hematoma, stage 2 small ulcers in sacrum  Respiratory: CTA BL, no respiratory distress  Cardiovascular: S1 and S2, RRR, no M/G/R  Gastrointestinal: BS+, soft, NT/ND  Extremities: No peripheral edema;  strength 5/5. Normal tone and bulk. No abnormal movements. 3/5 RLE strength and 4/5 LLE strength, sensation intact  Vascular: 2+ peripheral pulses  Neurological: AxO2 to self and place but not time, no focal deficits appreciated, moving all extremities

## 2022-11-04 NOTE — DIETITIAN INITIAL EVALUATION ADULT - PERTINENT MEDS FT
MEDICATIONS  (STANDING):  cefTRIAXone   IVPB 1000 milliGRAM(s) IV Intermittent every 24 hours  enoxaparin Injectable 40 milliGRAM(s) SubCutaneous every 24 hours  folic acid 1 milliGRAM(s) Oral daily  thiamine IVPB 500 milliGRAM(s) IV Intermittent daily    MEDICATIONS  (PRN):  acetaminophen     Tablet .. 650 milliGRAM(s) Oral every 6 hours PRN Temp greater or equal to 38C (100.4F), Mild Pain (1 - 3)  aluminum hydroxide/magnesium hydroxide/simethicone Suspension 30 milliLiter(s) Oral every 4 hours PRN Dyspepsia  LORazepam   Injectable 2 milliGRAM(s) IV Push every 1 hour PRN CIWA-Ar score 8 or greater  melatonin 3 milliGRAM(s) Oral at bedtime PRN Insomnia  ondansetron Injectable 4 milliGRAM(s) IV Push every 8 hours PRN Nausea and/or Vomiting

## 2022-11-04 NOTE — PROGRESS NOTE ADULT - ATTENDING COMMENTS
acute metabolic encephalopathy due to UTI (does not meet sepsis criteria)  - c/w ceftriaxone 1g q24h and follow ucx s/s  - rule out other causes of encephalopathy such as alcohol use disorder, continue thiamine IVBP 500mg daily for 3-5 days.  - neurology c/s appreciated -- will obtain MRB  - reviewed spinal imaging - high grade degenerative disease -- outpatient spine f/u  - PT c/s  - wound care c/s for decubitus ulcer management.  - palliative care c/s appreciated.  - needs formal med recc by house staff -- will call family; avoid benzo's and anti-cholinergics.    plan for discharge once mental status improves

## 2022-11-04 NOTE — H&P ADULT - PROBLEM SELECTOR PLAN 5
Patient in the 108, likely in the setting of pain +/- possible withdrawal.   - IVF PRN (consider mIVF 100cc/hr)  -obtain UA to eval spec grav for dehydration   -c/w to monitor  -address pain and withdrawal    #Diffuse T wave inversion   Trop negative x1, unknown cardiovascular history. As per friend not taking any medications.  -repeat EKG in the morning Patient in the 108, likely in the setting of pain +/- possible withdrawal.   -c/w to monitor  -address pain and withdrawal    #Diffuse T wave inversion   Trop negative x1, unknown cardiovascular history. As per friend not taking any medications.  -repeat EKG in the morning CTH unremarkable  -Follow up CT T/L spine   -Follow up Neuro consult   -B12, folate, TSH   -TTE to r/o aortic stenosis  -Orthostatic BP once able/pt stable  - PT consult

## 2022-11-05 LAB
ANION GAP SERPL CALC-SCNC: 10 MMOL/L — SIGNIFICANT CHANGE UP (ref 5–17)
BUN SERPL-MCNC: 8 MG/DL — SIGNIFICANT CHANGE UP (ref 7–23)
CALCIUM SERPL-MCNC: 9 MG/DL — SIGNIFICANT CHANGE UP (ref 8.4–10.5)
CHLORIDE SERPL-SCNC: 105 MMOL/L — SIGNIFICANT CHANGE UP (ref 96–108)
CO2 SERPL-SCNC: 27 MMOL/L — SIGNIFICANT CHANGE UP (ref 22–31)
CREAT SERPL-MCNC: 0.76 MG/DL — SIGNIFICANT CHANGE UP (ref 0.5–1.3)
EGFR: 88 ML/MIN/1.73M2 — SIGNIFICANT CHANGE UP
FOLATE SERPL-MCNC: 14.8 NG/ML — SIGNIFICANT CHANGE UP
GLUCOSE BLDC GLUCOMTR-MCNC: 123 MG/DL — HIGH (ref 70–99)
GLUCOSE SERPL-MCNC: 106 MG/DL — HIGH (ref 70–99)
HCT VFR BLD CALC: 34.7 % — SIGNIFICANT CHANGE UP (ref 34.5–45)
HGB BLD-MCNC: 11.5 G/DL — SIGNIFICANT CHANGE UP (ref 11.5–15.5)
HSV DNA1: SIGNIFICANT CHANGE UP
HSV DNA2: SIGNIFICANT CHANGE UP
HSV1 DNA BLD QL NAA+PROBE: SIGNIFICANT CHANGE UP
HSV2 DNA BLD QL NAA+PROBE: SIGNIFICANT CHANGE UP
MAGNESIUM SERPL-MCNC: 1.8 MG/DL — SIGNIFICANT CHANGE UP (ref 1.6–2.6)
MCHC RBC-ENTMCNC: 33.1 GM/DL — SIGNIFICANT CHANGE UP (ref 32–36)
MCHC RBC-ENTMCNC: 34.4 PG — HIGH (ref 27–34)
MCV RBC AUTO: 103.9 FL — HIGH (ref 80–100)
NRBC # BLD: 0 /100 WBCS — SIGNIFICANT CHANGE UP (ref 0–0)
PHOSPHATE SERPL-MCNC: 3.2 MG/DL — SIGNIFICANT CHANGE UP (ref 2.5–4.5)
PLATELET # BLD AUTO: 149 K/UL — LOW (ref 150–400)
POTASSIUM SERPL-MCNC: 4 MMOL/L — SIGNIFICANT CHANGE UP (ref 3.5–5.3)
POTASSIUM SERPL-SCNC: 4 MMOL/L — SIGNIFICANT CHANGE UP (ref 3.5–5.3)
RBC # BLD: 3.34 M/UL — LOW (ref 3.8–5.2)
RBC # FLD: 12.4 % — SIGNIFICANT CHANGE UP (ref 10.3–14.5)
SODIUM SERPL-SCNC: 142 MMOL/L — SIGNIFICANT CHANGE UP (ref 135–145)
T PALLIDUM AB TITR SER: NEGATIVE — SIGNIFICANT CHANGE UP
VIT B12 SERPL-MCNC: 694 PG/ML — SIGNIFICANT CHANGE UP (ref 232–1245)
WBC # BLD: 11.35 K/UL — HIGH (ref 3.8–10.5)
WBC # FLD AUTO: 11.35 K/UL — HIGH (ref 3.8–10.5)

## 2022-11-05 PROCEDURE — 99232 SBSQ HOSP IP/OBS MODERATE 35: CPT | Mod: GC

## 2022-11-05 PROCEDURE — 93010 ELECTROCARDIOGRAM REPORT: CPT

## 2022-11-05 PROCEDURE — 99233 SBSQ HOSP IP/OBS HIGH 50: CPT

## 2022-11-05 RX ADMIN — Medication 105 MILLIGRAM(S): at 11:57

## 2022-11-05 RX ADMIN — Medication 1 MILLIGRAM(S): at 11:57

## 2022-11-05 RX ADMIN — ENOXAPARIN SODIUM 40 MILLIGRAM(S): 100 INJECTION SUBCUTANEOUS at 05:33

## 2022-11-05 RX ADMIN — CEFTRIAXONE 100 MILLIGRAM(S): 500 INJECTION, POWDER, FOR SOLUTION INTRAMUSCULAR; INTRAVENOUS at 05:33

## 2022-11-05 NOTE — PROGRESS NOTE ADULT - SUBJECTIVE AND OBJECTIVE BOX
JANETTE SHEARER 62y Female  MRN#: [id number]   Hospital Day: 2d    SUBJECTIVE  Patient is a 62y old Female who presents with a chief complaint of AMS (2022 06:28)  Currently admitted to medicine with the primary diagnosis of AMS (altered mental status)      INTERVAL HPI AND OVERNIGHT EVENTS:  Patient was examined and seen at bedside. This morning she is resting comfortably in bed and reports no issues or overnight events.    REVIEW OF SYMPTOMS:  Admits to being steady, otherwise no complaints    OBJECTIVE  PAST MEDICAL & SURGICAL HISTORY  ETOH abuse      ALLERGIES:  No Known Allergies    MEDICATIONS:  STANDING MEDICATIONS  cefTRIAXone   IVPB 1000 milliGRAM(s) IV Intermittent every 24 hours  enoxaparin Injectable 40 milliGRAM(s) SubCutaneous every 24 hours  folic acid 1 milliGRAM(s) Oral daily  thiamine IVPB 500 milliGRAM(s) IV Intermittent daily    PRN MEDICATIONS  acetaminophen     Tablet .. 650 milliGRAM(s) Oral every 6 hours PRN  aluminum hydroxide/magnesium hydroxide/simethicone Suspension 30 milliLiter(s) Oral every 4 hours PRN  LORazepam   Injectable 2 milliGRAM(s) IV Push every 1 hour PRN  melatonin 3 milliGRAM(s) Oral at bedtime PRN  ondansetron Injectable 4 milliGRAM(s) IV Push every 8 hours PRN      VITAL SIGNS: Last 24 Hours  T(C): 36.7 (2022 07:00), Max: 36.7 (2022 05:09)  T(F): 98 (2022 07:00), Max: 98.1 (2022 05:09)  HR: 88 (2022 07:00) (78 - 105)  BP: 127/78 (2022 07:00) (127/78 - 159/85)  BP(mean): --  RR: 18 (2022 07:00) (18 - 18)  SpO2: 99% (2022 07:00) (98% - 100%)    LABS:                        11.5   11.35 )-----------( 149      ( 2022 05:30 )             34.7     11-    142  |  105  |  8   ----------------------------<  106<H>  4.0   |  27  |  0.76    Ca    9.0      2022 05:30  Phos  3.2     11-05  Mg     1.8     11-05    TPro  6.4  /  Alb  3.5  /  TBili  0.3  /  DBili  x   /  AST  22  /  ALT  12  /  AlkPhos  78  11-04    PT/INR - ( 2022 15:56 )   PT: 11.8 sec;   INR: 0.99          PTT - ( 2022 15:56 )  PTT:27.0 sec  Urinalysis Basic - ( 2022 01:28 )    Color: Yellow / Appearance: Cloudy / S.010 / pH: x  Gluc: x / Ketone: NEGATIVE  / Bili: Negative / Urobili: 0.2 E.U./dL   Blood: x / Protein: NEGATIVE mg/dL / Nitrite: POSITIVE   Leuk Esterase: Moderate / RBC: < 5 /HPF / WBC > 10 /HPF   Sq Epi: x / Non Sq Epi: 0-5 /HPF / Bacteria: Present /HPF            Culture - Urine (collected 2022 01:28)  Source: Clean Catch None  Preliminary Report (2022 13:55):    >100,000 CFU/ml Klebsiella pneumoniae Susceptibility to follow.    Culture in progress    Urinalysis with Rflx Culture (collected 2022 01:28)      CARDIAC MARKERS ( 2022 15:56 )  x     / 0.01 ng/mL / x     / x     / x          RADIOLOGY:      PHYSICAL EXAM:  CONSTITUTIONAL: No acute distress,  Alert and oriented to person and place(Hospital, though she thinks she is in a hospital in the [place])  HEAD: Atraumatic, normocephalic  EYES: EOM intact, conjunctiva and sclera clear  PULMONARY: In no resp distress  MUSCULOSKELETAL: no edema in lower extremities  NEUROLOGY: Neurological Exam:   Mental status:   Cranial nerves: Pupils equally round and reactive to light, visual fields full, no nystagmus, extraocular muscles intact, V1 through V3 intact bilaterally and symmetric, face symmetric, hearing intact to finger rub, palate elevation symmetric, tongue was midline.  Motor:  5/5 b/l UE/LE.   strength 5/5.  Normal tone and bulk.  No abnormal movements.    Sensation: Intact to light touch, proprioception, and pinprick.  Coordination: No dysmetria on finger-to-nose and heel-to-shin.  No dysdiadokinesia.  Reflexes: 2+ in bilateral UE/LE, downgoing toes bilaterally.  Gait: deferred      SKIN: sacral ulcer     JANETTE SHEARER 62y Female  MRN#: [id number]   Hospital Day: 2d    SUBJECTIVE  Patient is a 62y old Female who presents with a chief complaint of AMS, difficulty ambulating c/b falls.   Currently admitted to medicine with the primary diagnosis of AMS (altered mental status)      INTERVAL HPI AND OVERNIGHT EVENTS:  Patient was examined and seen at bedside. This morning she is resting comfortably in bed and reports no issues or overnight events.    REVIEW OF SYMPTOMS:  Admits to being steady, otherwise no complaints    OBJECTIVE  PAST MEDICAL & SURGICAL HISTORY  ETOH abuse      ALLERGIES:  No Known Allergies    MEDICATIONS:  STANDING MEDICATIONS  cefTRIAXone   IVPB 1000 milliGRAM(s) IV Intermittent every 24 hours  enoxaparin Injectable 40 milliGRAM(s) SubCutaneous every 24 hours  folic acid 1 milliGRAM(s) Oral daily  thiamine IVPB 500 milliGRAM(s) IV Intermittent daily    PRN MEDICATIONS  acetaminophen     Tablet .. 650 milliGRAM(s) Oral every 6 hours PRN  aluminum hydroxide/magnesium hydroxide/simethicone Suspension 30 milliLiter(s) Oral every 4 hours PRN  LORazepam   Injectable 2 milliGRAM(s) IV Push every 1 hour PRN  melatonin 3 milliGRAM(s) Oral at bedtime PRN  ondansetron Injectable 4 milliGRAM(s) IV Push every 8 hours PRN      VITAL SIGNS: Last 24 Hours  T(C): 36.7 (2022 07:00), Max: 36.7 (2022 05:09)  T(F): 98 (2022 07:00), Max: 98.1 (2022 05:09)  HR: 88 (2022 07:00) (78 - 105)  BP: 127/78 (2022 07:00) (127/78 - 159/85)  BP(mean): --  RR: 18 (2022 07:00) (18 - 18)  SpO2: 99% (2022 07:00) (98% - 100%)    LABS:                        11.5   11.35 )-----------( 149      ( 2022 05:30 )             34.7     11-    142  |  105  |  8   ----------------------------<  106<H>  4.0   |  27  |  0.76    Ca    9.0      2022 05:30  Phos  3.2     11-05  Mg     1.8     11-05    TPro  6.4  /  Alb  3.5  /  TBili  0.3  /  DBili  x   /  AST  22  /  ALT  12  /  AlkPhos  78  11-04    PT/INR - ( 2022 15:56 )   PT: 11.8 sec;   INR: 0.99          PTT - ( 2022 15:56 )  PTT:27.0 sec  Urinalysis Basic - ( 2022 01:28 )    Color: Yellow / Appearance: Cloudy / S.010 / pH: x  Gluc: x / Ketone: NEGATIVE  / Bili: Negative / Urobili: 0.2 E.U./dL   Blood: x / Protein: NEGATIVE mg/dL / Nitrite: POSITIVE   Leuk Esterase: Moderate / RBC: < 5 /HPF / WBC > 10 /HPF   Sq Epi: x / Non Sq Epi: 0-5 /HPF / Bacteria: Present /HPF            Culture - Urine (collected 2022 01:28)  Source: Clean Catch None  Preliminary Report (2022 13:55):    >100,000 CFU/ml Klebsiella pneumoniae Susceptibility to follow.    Culture in progress    Urinalysis with Rflx Culture (collected 2022 01:28)      CARDIAC MARKERS ( 2022 15:56 )  x     / 0.01 ng/mL / x     / x     / x          RADIOLOGY:      PHYSICAL EXAM:  CONSTITUTIONAL: No acute distress,  Alert and oriented to person, knows shes' in the Hospital, though she thinks she is in a hospital in the [place]  HEAD: Atraumatic, normocephalic  EYES: EOM intact, conjunctiva and sclera clear  PULMONARY: In no resp distress  MUSCULOSKELETAL: no edema in lower extremities  NEUROLOGY: Neurological Exam:   Mental status:   Cranial nerves: Pupils equally round and reactive to light, visual fields full, no nystagmus, extraocular muscles intact, V1 through V3 intact bilaterally and symmetric, face symmetric, hearing intact to finger rub, palate elevation symmetric, tongue was midline.  Motor:  4/5 b/l UE/LE.   strength 5/5.  Normal tone and bulk.  No abnormal movements.    Sensation: Intact to light touch, decreased vibration moderate in B/L toes, mild at B/L ankle  Coordination: dysmetria on finger-to-nose L>R and heel-to-shin.   Reflexes: 3+ in bilateral UE/LE, downgoing toes bilaterally.  Gait: wide based stance, unsteady, cannot walk without examiner's assistance  SKIN: sacral ulcer presnt     JNAETTE SHEARER 62y Female  MRN#: [id number]   Hospital Day: 2d    SUBJECTIVE  Patient is a 62y old Female who presents with a chief complaint of AMS, difficulty ambulating c/b falls.   Currently admitted to medicine with the primary diagnosis of AMS (altered mental status)      INTERVAL HPI AND OVERNIGHT EVENTS:  Patient was examined and seen at bedside. This morning she is resting comfortably in bed and reports no issues or overnight events.    REVIEW OF SYMPTOMS:  Admits to being unsteady, otherwise no complaints    OBJECTIVE  PAST MEDICAL & SURGICAL HISTORY  ETOH abuse      ALLERGIES:  No Known Allergies    MEDICATIONS:  STANDING MEDICATIONS  cefTRIAXone   IVPB 1000 milliGRAM(s) IV Intermittent every 24 hours  enoxaparin Injectable 40 milliGRAM(s) SubCutaneous every 24 hours  folic acid 1 milliGRAM(s) Oral daily  thiamine IVPB 500 milliGRAM(s) IV Intermittent daily    PRN MEDICATIONS  acetaminophen     Tablet .. 650 milliGRAM(s) Oral every 6 hours PRN  aluminum hydroxide/magnesium hydroxide/simethicone Suspension 30 milliLiter(s) Oral every 4 hours PRN  LORazepam   Injectable 2 milliGRAM(s) IV Push every 1 hour PRN  melatonin 3 milliGRAM(s) Oral at bedtime PRN  ondansetron Injectable 4 milliGRAM(s) IV Push every 8 hours PRN      VITAL SIGNS: Last 24 Hours  T(C): 36.7 (2022 07:00), Max: 36.7 (2022 05:09)  T(F): 98 (2022 07:00), Max: 98.1 (2022 05:09)  HR: 88 (2022 07:00) (78 - 105)  BP: 127/78 (2022 07:00) (127/78 - 159/85)  BP(mean): --  RR: 18 (2022 07:00) (18 - 18)  SpO2: 99% (2022 07:00) (98% - 100%)    LABS:                        11.5   11.35 )-----------( 149      ( 2022 05:30 )             34.7     11-05    142  |  105  |  8   ----------------------------<  106<H>  4.0   |  27  |  0.76    Ca    9.0      2022 05:30  Phos  3.2     11-05  Mg     1.8     11-05    TPro  6.4  /  Alb  3.5  /  TBili  0.3  /  DBili  x   /  AST  22  /  ALT  12  /  AlkPhos  78  11-04    PT/INR - ( 2022 15:56 )   PT: 11.8 sec;   INR: 0.99          PTT - ( 2022 15:56 )  PTT:27.0 sec  Urinalysis Basic - ( 2022 01:28 )    Color: Yellow / Appearance: Cloudy / S.010 / pH: x  Gluc: x / Ketone: NEGATIVE  / Bili: Negative / Urobili: 0.2 E.U./dL   Blood: x / Protein: NEGATIVE mg/dL / Nitrite: POSITIVE   Leuk Esterase: Moderate / RBC: < 5 /HPF / WBC > 10 /HPF   Sq Epi: x / Non Sq Epi: 0-5 /HPF / Bacteria: Present /HPF            Culture - Urine (collected 2022 01:28)  Source: Clean Catch None  Preliminary Report (2022 13:55):    >100,000 CFU/ml Klebsiella pneumoniae Susceptibility to follow.    Culture in progress    Urinalysis with Rflx Culture (collected 2022 01:28)      CARDIAC MARKERS ( 2022 15:56 )  x     / 0.01 ng/mL / x     / x     / x          RADIOLOGY:      PHYSICAL EXAM:  CONSTITUTIONAL: No acute distress,  Alert and oriented to person, knows shes' in the Hospital, though she thinks she is in a hospital in the [place]  HEAD: Atraumatic, normocephalic  EYES: EOM intact, conjunctiva and sclera clear  PULMONARY: In no resp distress  MUSCULOSKELETAL: no edema in lower extremities  NEUROLOGY: Neurological Exam:   Mental status:   Cranial nerves: Pupils equally round and reactive to light, visual fields full, no nystagmus, extraocular muscles intact, V1 through V3 intact bilaterally and symmetric, face symmetric, hearing intact to finger rub, palate elevation symmetric, tongue was midline.  Motor:  4/5 b/l UE/LE.   strength 5/5.  Normal tone and bulk.  No abnormal movements.    Sensation: Intact to light touch, decreased vibration moderate in B/L toes, mild at B/L ankle  Coordination: dysmetria on finger-to-nose L>R and heel-to-shin.   Reflexes: 3+ in bilateral UE/LE, downgoing toes bilaterally.  Gait: wide based stance, unsteady, cannot walk without examiner's assistance  SKIN: sacral ulcer presnt     JANETTE SHEARER 62y Female  MRN#: [id number]   Hospital Day: 2d    SUBJECTIVE  Patient is a 62y old Female who presents with a chief complaint of AMS, difficulty ambulating c/b falls.   Currently admitted to medicine with the primary diagnosis of AMS (altered mental status)      INTERVAL HPI AND OVERNIGHT EVENTS:  Patient was examined and seen at bedside. This morning she is resting comfortably in bed and reports no issues or overnight events.    REVIEW OF SYMPTOMS:  Admits to being unsteady, otherwise no complaints    OBJECTIVE  PAST MEDICAL & SURGICAL HISTORY  ETOH abuse      ALLERGIES:  No Known Allergies    MEDICATIONS:  STANDING MEDICATIONS  cefTRIAXone   IVPB 1000 milliGRAM(s) IV Intermittent every 24 hours  enoxaparin Injectable 40 milliGRAM(s) SubCutaneous every 24 hours  folic acid 1 milliGRAM(s) Oral daily  thiamine IVPB 500 milliGRAM(s) IV Intermittent daily    PRN MEDICATIONS  acetaminophen     Tablet .. 650 milliGRAM(s) Oral every 6 hours PRN  aluminum hydroxide/magnesium hydroxide/simethicone Suspension 30 milliLiter(s) Oral every 4 hours PRN  LORazepam   Injectable 2 milliGRAM(s) IV Push every 1 hour PRN  melatonin 3 milliGRAM(s) Oral at bedtime PRN  ondansetron Injectable 4 milliGRAM(s) IV Push every 8 hours PRN      VITAL SIGNS: Last 24 Hours  T(C): 36.7 (2022 07:00), Max: 36.7 (2022 05:09)  T(F): 98 (2022 07:00), Max: 98.1 (2022 05:09)  HR: 88 (2022 07:00) (78 - 105)  BP: 127/78 (2022 07:00) (127/78 - 159/85)  BP(mean): --  RR: 18 (2022 07:00) (18 - 18)  SpO2: 99% (2022 07:00) (98% - 100%)    LABS:                        11.5   11.35 )-----------( 149      ( 2022 05:30 )             34.7     11-05    142  |  105  |  8   ----------------------------<  106<H>  4.0   |  27  |  0.76    Ca    9.0      2022 05:30  Phos  3.2     11-05  Mg     1.8     11-05    TPro  6.4  /  Alb  3.5  /  TBili  0.3  /  DBili  x   /  AST  22  /  ALT  12  /  AlkPhos  78  11-04    PT/INR - ( 2022 15:56 )   PT: 11.8 sec;   INR: 0.99          PTT - ( 2022 15:56 )  PTT:27.0 sec  Urinalysis Basic - ( 2022 01:28 )    Color: Yellow / Appearance: Cloudy / S.010 / pH: x  Gluc: x / Ketone: NEGATIVE  / Bili: Negative / Urobili: 0.2 E.U./dL   Blood: x / Protein: NEGATIVE mg/dL / Nitrite: POSITIVE   Leuk Esterase: Moderate / RBC: < 5 /HPF / WBC > 10 /HPF   Sq Epi: x / Non Sq Epi: 0-5 /HPF / Bacteria: Present /HPF            Culture - Urine (collected 2022 01:28)  Source: Clean Catch None  Preliminary Report (2022 13:55):    >100,000 CFU/ml Klebsiella pneumoniae Susceptibility to follow.    Culture in progress    Urinalysis with Rflx Culture (collected 2022 01:28)      CARDIAC MARKERS ( 2022 15:56 )  x     / 0.01 ng/mL / x     / x     / x          RADIOLOGY:      PHYSICAL EXAM:  CONSTITUTIONAL: No acute distress,  Alert and oriented to person, knows shes' in the Hospital, though she thinks she is in a hospital in the [place]. States date is 2012.  HEAD: Atraumatic, normocephalic  EYES: EOM intact, conjunctiva and sclera clear  PULMONARY: In no resp distress  MUSCULOSKELETAL: no edema in lower extremities  NEUROLOGY: Neurological Exam:   Mental status:   Cranial nerves: Pupils equally round and reactive to light, visual fields full, no nystagmus, extraocular muscles intact, V1 through V3 intact bilaterally and symmetric, face symmetric, hearing intact to finger rub, palate elevation symmetric, tongue was midline.  Motor:  5/5 b/l UE/LE.  Normal tone and bulk.  No abnormal movements.    Sensation: Intact to light touch, decreased vibration moderate in B/L toes, mild at B/L ankle  Coordination: mild-mod dysmetria on finger-to-nose L>R and heel-to-shin.   Reflexes: 3+ in bilateral UE/LE, downgoing toes bilaterally.  Gait: wide based stance, unsteady, cannot walk without examiner's assistance  SKIN: sacral ulcer present

## 2022-11-05 NOTE — PROGRESS NOTE ADULT - PROBLEM SELECTOR PLAN 5
CTH unremarkable. CT T/L Negative.    -Follow up Neuro consult   -B12, folate, TSH   -TTE to r/o aortic stenosis  -Orthostatic BP once able/pt stable  - PT consult CTH unremarkable. CT T/L Negative.    -Follow up Neuro recs  -TTE to r/o aortic stenosis  -Orthostatic BP once able/pt stable  - PT consult, rec DONALD CTH unremarkable. CT T/L Negative.    -Follow up Neuro recs  -Orthostatic BP once able/pt stable, pt remains unable to walk  - PT consult, rec DONALD

## 2022-11-05 NOTE — PROGRESS NOTE ADULT - PROBLEM SELECTOR PLAN 4
Per friend, pt with progressive LE weakness. CT T/L negative for any acute pathology    - Neuro consulted  - B12, folate, TSH Per friend, pt with progressive LE weakness. CT T/L negative for any acute pathology  B12, folate wnl. Pending TSH. LE weakness improved on physical exam this AM.     - f/u Neuro recs  - F/u MRI  - OOBTC Per friend, pt with progressive LE weakness. CT T/L negative for any acute pathology  B12, folate wnl. Pending TSH. LE weakness improved on physical exam this AM.     Per neuro, patient unable to stand without assistance. PT eval for DONALD. Pending MRI for likely Wernicke-Korsakoff encephalopathy.    - f/u Neuro recs  - F/u MRI

## 2022-11-05 NOTE — PROGRESS NOTE ADULT - ATTENDING COMMENTS
AMS and gait impairment  Dysmetria on exam with very wide based stance / gait consistent with cerebellar impairment  Given history of alcohol use very likely alcohol related cerebellar dysfunction +/- wernicke encephalopathy spectrum (although no EOM abnormalities)   Continue high dose thiamine treatment  f/u MRI brain  PT/OT/dispo planning AMS and gait impairment  Dysmetria on exam with very wide based stance / gait consistent with cerebellar impairment    CTs of C and L spine without cord or cauda equina compression and exam is not consistent with that either   CT head - volume loss and microvascular disease, no acute findings     Given history of alcohol use and exam findings, etiology of symptoms most consistent with alcohol related cerebellar dysfunction / wernicke encephalopathy spectrum (although no EOM impairment)     Continue high dose thiamine treatment  f/u MRI brain  PT/OT/dispo planning

## 2022-11-05 NOTE — PROGRESS NOTE ADULT - ATTENDING COMMENTS
62F poor historian PMHx of EtoH abuse (6 beers daily), MDD bib friend/roommate for AMS, difficulty ambulating and inability of caring for herself s/p recent falls, admitted for encephalopathy and inability to ambulate 2/2 LE weakness, found with UTI    -Ceftriaxone 1gm iv daily ( 11/4-) Day 2 of 3   - f/u UCx  - CIWA 0 this am, c/w Thiamine 500mg iv daily for 5 days and Folic acid 1mg po daily   - OOBTC with assistance   - Rehab consult appreciated rec; Dr. Viviana BENNETT to d/w daughter for disposition  - POC as d/w Dr. Michelle

## 2022-11-05 NOTE — PROGRESS NOTE ADULT - ASSESSMENT
per Internal Medicine    62 y o F poor historian PMHx of EtoH abuse (6 beers daily), MDD bib friend/roommate for AMS, difficulty ambulating and inability of caring for herself s/p recent falls, admitted for encephalopathy and inability to ambulate 2/2 LE weakness, found with UTI      Problem/Plan - 1:  ·  Problem: Encephalopathy.   ·  Plan: Metabolic vs Toxic   Pt with cerebellar deficits on physical exam. Hep C, HSV, VDRL all negative. CT Head reviewed, showed diffuse cerebral volume loss and extensive white matter changes. CTA H/N reviewed, showed no LVO or stenosis. Pt w/ history of MDD per friend. Ddx includes Wernicke-Korsakoff encephalopathy in the setting of chronic EtOH use. B12 and folate wnl on blood work. Pending MRI. Symptoms unlikely to be due to UTI, however will complete antibiotic course.    - Treat UTI with CTX x3 days (last day 11/6)  - Thiamine 500 mg IV x 5 day  - Follow up neurology recs  - MRI pending.    Problem/Plan - 2:  ·  Problem: Alcohol withdrawal.   ·  Plan: Upon admission Alcohol level <10.  CIWA score 0 at the time of assessment by ICU team, s/p Ativan 1mg for agitation. For the last days friend has probably provided 1-2 beers per day to avoid withdrawal. Patient consumes 6 beers per day at baseline. Friend unsure if patient has history of seizures, intubation, previous alcohol withdrawals nor esophageal bleeds.     CIWA 0 this AM    -ativan 2 mg IVP q2h/PRN   -fall precautions    -Thiamine 500 mg IV x 5 day  -Folic acid 100 mg PO, MV.    Problem/Plan - 3:  ·  Problem: UTI (urinary tract infection).   ·  Plan: Pt reports dysuria. UA positive. Treat for symptomatic UTI    - CTX x 3days, 1st dose (11/4).    Problem/Plan - 4:  ·  Problem: Lower extremity weakness.   ·  Plan: Per friend, pt with progressive LE weakness. CT T/L negative for any acute pathology  B12, folate wnl. Pending TSH. LE weakness improved on physical exam this AM.     Per neuro, patient unable to stand without assistance. PT eval for DONALD. Pending MRI for likely Wernicke-Korsakoff encephalopathy.    - f/u Neuro recs  - F/u MRI.    Problem/Plan - 5:  ·  Problem: Fall.   ·  Plan: CTH unremarkable. CT T/L Negative.    -Follow up Neuro recs  -Orthostatic BP once able/pt stable, pt remains unable to walk  - PT consult, rec DONALD.    Problem/Plan - 6:  ·  Problem: Tachycardia.   ·  Plan: Patient tachy to 108, likely in the setting of pain. No acute alcohol withdrawal. Tachycardia has resolved. EKG NSR    -cont to monitor  -address pain prn    #Diffuse T wave inversion   Trop negative x1, unknown cardiovascular history. As per friend not taking any medications.  -repeat EKG in the morning, unchanged.    Problem/Plan - 7:  ·  Problem: MDD (major depressive disorder).   ·  Plan: - Med rec pending collateral from family.    Problem/Plan - 8:  ·  Problem: Prophylactic measure.   ·  Plan: F: PO  E: replete PRN, closely monitor BMP + Mg/Phos BID  Diet: NPO given AMS  DVT PPx: lovenox  GI PPx: none  Code: FULL  Dispo: [facility].

## 2022-11-05 NOTE — PROGRESS NOTE ADULT - SUBJECTIVE AND OBJECTIVE BOX
INCOMPLETE NOTE    INTERVAL HPI/OVERNIGHT EVENTS:  Patient was seen and examined at bedside. As per nurse and patient, no o/n events, patient resting comfortably. No complaints at this time. Patient denies: fever, chills, lightheadedness, weakness, CP, palpitations, SOB, cough, N/V. ROS otherwise negative.    VITAL SIGNS:  T(F): 98.1 (22 @ 05:09)  HR: 78 (22 @ 05:09)  BP: 136/81 (22 @ 05:09)  RR: 18 (22 @ 05:09)  SpO2: 98% (22 @ 05:09)  Wt(kg): --        PHYSICAL EXAM:    Constitutional: resting comfortably in bed; NAD  HEENT: NC/AT, PER, anicteric sclera, no nasal discharge; MMM  Neck: supple; no JVD or thyromegaly  Respiratory: CTA B/L; no W/R/R, no retractions  Cardiac: +S1/S2; RRR; no M/R/G  Gastrointestinal: soft, NT/ND; no rebound or guarding  Back: spine midline, no bony tenderness or step-offs; no CVAT B/L  Extremities: WWP, no clubbing or cyanosis; no peripheral edema  Musculoskeletal: NROM x4; no joint swelling, tenderness or erythema  Vascular: 2+ radial, DP/PT pulses B/L  Dermatologic: skin warm, dry and intact; no rashes, wounds, or scars  Neurologic: AAOx3; CNII-XII grossly intact; no focal deficits  Psychiatric: affect and characteristics of appearance, verbalizations, behaviors are appropriate    MEDICATIONS  (STANDING):  cefTRIAXone   IVPB 1000 milliGRAM(s) IV Intermittent every 24 hours  enoxaparin Injectable 40 milliGRAM(s) SubCutaneous every 24 hours  folic acid 1 milliGRAM(s) Oral daily  thiamine IVPB 500 milliGRAM(s) IV Intermittent daily    MEDICATIONS  (PRN):  acetaminophen     Tablet .. 650 milliGRAM(s) Oral every 6 hours PRN Temp greater or equal to 38C (100.4F), Mild Pain (1 - 3)  aluminum hydroxide/magnesium hydroxide/simethicone Suspension 30 milliLiter(s) Oral every 4 hours PRN Dyspepsia  LORazepam   Injectable 2 milliGRAM(s) IV Push every 1 hour PRN CIWA-Ar score 8 or greater  melatonin 3 milliGRAM(s) Oral at bedtime PRN Insomnia  ondansetron Injectable 4 milliGRAM(s) IV Push every 8 hours PRN Nausea and/or Vomiting      Allergies    No Known Allergies    Intolerances        LABS:                        12.7   9.13  )-----------( 149      ( 2022 15:23 )             37.8     11-04    140  |  104  |  4<L>  ----------------------------<  94  4.2   |  28  |  0.65    Ca    9.2      2022 15:23  Phos  2.7     11-04  Mg     1.4     11-04    TPro  6.4  /  Alb  3.5  /  TBili  0.3  /  DBili  x   /  AST  22  /  ALT  12  /  AlkPhos  78  11-04    PT/INR - ( 2022 15:56 )   PT: 11.8 sec;   INR: 0.99          PTT - ( 2022 15:56 )  PTT:27.0 sec  Urinalysis Basic - ( 2022 01:28 )    Color: Yellow / Appearance: Cloudy / S.010 / pH: x  Gluc: x / Ketone: NEGATIVE  / Bili: Negative / Urobili: 0.2 E.U./dL   Blood: x / Protein: NEGATIVE mg/dL / Nitrite: POSITIVE   Leuk Esterase: Moderate / RBC: < 5 /HPF / WBC > 10 /HPF   Sq Epi: x / Non Sq Epi: 0-5 /HPF / Bacteria: Present /HPF        RADIOLOGY & ADDITIONAL TESTS:  Reviewed INTERVAL HPI/OVERNIGHT EVENTS:  Patient was seen and examined at bedside. As per o/n team, pt complained of midsternal 7/10 chest pain. EKG unchanged from previous. This AM, pt comfortable in bed. Marked improvement in confusion from yesterday.  No complaints at this time. Patient denies: fever, chills, lightheadedness, weakness, CP, palpitations, SOB, cough, N/V. ROS otherwise negative.    VITAL SIGNS:  T(F): 98.1 (22 @ 05:09)  HR: 78 (22 @ 05:09)  BP: 136/81 (22 @ 05:09)  RR: 18 (22 @ 05:09)  SpO2: 98% (22 05:09)  Wt(kg): --        PHYSICAL EXAM:    Constitutional: thin elderly female resting comfortably in bed; NAD  HEENT: NC/AT, PER, anicteric sclera, no nasal discharge; dry MM  Neck: supple; no JVD or thyromegaly, no lymphadenopathy  Respiratory: CTA B/L; no W/R/R, no retractions  Cardiac: +S1/S2; RRR; no M/R/G  Gastrointestinal: soft, NT/ND; no rebound or guarding  Back: spine midline, no bony tenderness, stage 3 sacral ulcer  Extremities: WWP, no clubbing or cyanosis; no peripheral edema  Musculoskeletal: NROM x4; no joint swelling, tenderness or erythema. Normal  strength. 3/5 muscle strength b/l LE  Vascular: 2+ radial, DP/PT pulses B/L  Neurologic: AAOx2; Can move all extremities. No observed focal deficits. Sensation intact throughout.   Psychiatric: affect and characteristics of appearance, verbalizations, behaviors are appropriate.    MEDICATIONS  (STANDING):  cefTRIAXone   IVPB 1000 milliGRAM(s) IV Intermittent every 24 hours  enoxaparin Injectable 40 milliGRAM(s) SubCutaneous every 24 hours  folic acid 1 milliGRAM(s) Oral daily  thiamine IVPB 500 milliGRAM(s) IV Intermittent daily    MEDICATIONS  (PRN):  acetaminophen     Tablet .. 650 milliGRAM(s) Oral every 6 hours PRN Temp greater or equal to 38C (100.4F), Mild Pain (1 - 3)  aluminum hydroxide/magnesium hydroxide/simethicone Suspension 30 milliLiter(s) Oral every 4 hours PRN Dyspepsia  LORazepam   Injectable 2 milliGRAM(s) IV Push every 1 hour PRN CIWA-Ar score 8 or greater  melatonin 3 milliGRAM(s) Oral at bedtime PRN Insomnia  ondansetron Injectable 4 milliGRAM(s) IV Push every 8 hours PRN Nausea and/or Vomiting      Allergies    No Known Allergies    Intolerances        LABS:                        12.7   9.13  )-----------( 149      ( 2022 15:23 )             37.8     11-04    140  |  104  |  4<L>  ----------------------------<  94  4.2   |  28  |  0.65    Ca    9.2      2022 15:23  Phos  2.7     -04  Mg     1.4     04    TPro  6.4  /  Alb  3.5  /  TBili  0.3  /  DBili  x   /  AST  22  /  ALT  12  /  AlkPhos  78  11-04    PT/INR - ( 2022 15:56 )   PT: 11.8 sec;   INR: 0.99          PTT - ( 2022 15:56 )  PTT:27.0 sec  Urinalysis Basic - ( 2022 01:28 )    Color: Yellow / Appearance: Cloudy / S.010 / pH: x  Gluc: x / Ketone: NEGATIVE  / Bili: Negative / Urobili: 0.2 E.U./dL   Blood: x / Protein: NEGATIVE mg/dL / Nitrite: POSITIVE   Leuk Esterase: Moderate / RBC: < 5 /HPF / WBC > 10 /HPF   Sq Epi: x / Non Sq Epi: 0-5 /HPF / Bacteria: Present /HPF        RADIOLOGY & ADDITIONAL TESTS:  Reviewed INTERVAL HPI/OVERNIGHT EVENTS:  Patient was seen and examined at bedside. As per o/n team, pt complained of midsternal 7/10 chest pain. EKG unchanged from previous. This AM, pt comfortable in bed. Remains confused, stating she is in the [facility] and in her uncle's hospital bed. Unable to obtain ROS due to patient status    VITAL SIGNS:  T(F): 98.1 (22 @ 05:09)  HR: 78 (22 @ 05:09)  BP: 136/81 (22 @ 05:09)  RR: 18 (22 @ 05:09)  SpO2: 98% (22 @ 05:09)  Wt(kg): --        PHYSICAL EXAM:    Constitutional: thin elderly female resting comfortably in bed; NAD  HEENT: NC/AT, PER, anicteric sclera, no nasal discharge; dry MM  Neck: supple; no JVD or thyromegaly, no lymphadenopathy  Respiratory: CTA B/L; no W/R/R, no retractions  Cardiac: +S1/S2; RRR; no M/R/G  Gastrointestinal: soft, NT/ND; no rebound or guarding  Back: spine midline, no bony tenderness, stage 3 sacral ulcer  Extremities: WWP, no clubbing or cyanosis; no peripheral edema  Musculoskeletal: NROM x4; no joint swelling, tenderness or erythema. Normal  strength. 3/5 muscle strength b/l LE  Vascular: 2+ radial, DP/PT pulses B/L  Neurologic: AAOx2; Can move all extremities. No observed focal deficits. Sensation intact throughout. Dysmetria on cerebellar exam. Unable to stand.  Psychiatric: affect and characteristics of appearance, verbalizations, behaviors are appropriate.    MEDICATIONS  (STANDING):  cefTRIAXone   IVPB 1000 milliGRAM(s) IV Intermittent every 24 hours  enoxaparin Injectable 40 milliGRAM(s) SubCutaneous every 24 hours  folic acid 1 milliGRAM(s) Oral daily  thiamine IVPB 500 milliGRAM(s) IV Intermittent daily    MEDICATIONS  (PRN):  acetaminophen     Tablet .. 650 milliGRAM(s) Oral every 6 hours PRN Temp greater or equal to 38C (100.4F), Mild Pain (1 - 3)  aluminum hydroxide/magnesium hydroxide/simethicone Suspension 30 milliLiter(s) Oral every 4 hours PRN Dyspepsia  LORazepam   Injectable 2 milliGRAM(s) IV Push every 1 hour PRN CIWA-Ar score 8 or greater  melatonin 3 milliGRAM(s) Oral at bedtime PRN Insomnia  ondansetron Injectable 4 milliGRAM(s) IV Push every 8 hours PRN Nausea and/or Vomiting      Allergies    No Known Allergies    Intolerances        LABS:                        12.7   9.13  )-----------( 149      ( 2022 15:23 )             37.8     11-04    140  |  104  |  4<L>  ----------------------------<  94  4.2   |  28  |  0.65    Ca    9.2      2022 15:23  Phos  2.7     11-04  Mg     1.4     11-04    TPro  6.4  /  Alb  3.5  /  TBili  0.3  /  DBili  x   /  AST  22  /  ALT  12  /  AlkPhos  78  11-04    PT/INR - ( 2022 15:56 )   PT: 11.8 sec;   INR: 0.99          PTT - ( 2022 15:56 )  PTT:27.0 sec  Urinalysis Basic - ( 2022 01:28 )    Color: Yellow / Appearance: Cloudy / S.010 / pH: x  Gluc: x / Ketone: NEGATIVE  / Bili: Negative / Urobili: 0.2 E.U./dL   Blood: x / Protein: NEGATIVE mg/dL / Nitrite: POSITIVE   Leuk Esterase: Moderate / RBC: < 5 /HPF / WBC > 10 /HPF   Sq Epi: x / Non Sq Epi: 0-5 /HPF / Bacteria: Present /HPF        RADIOLOGY & ADDITIONAL TESTS:  Reviewed

## 2022-11-05 NOTE — PROGRESS NOTE ADULT - PROBLEM SELECTOR PLAN 1
Metabolic vs Toxic  DDX: Infx (HSV encephalopathy vs UTI confirmed on UA) vs Toxin/Medication induced (patient unaware of home meds or pharmacy. No meds in surescripts.)  Pt w/ history of MDD per friend. Ddx includes Wernecke/Korsakoff encephalopathy in the setting of chronic EtOH use   CT Head reviewed, showed diffuse cerebral volume loss and extensive white matter changes  CTA H/N reviewed, showed no LVO or stenosis  utox negative    - Follow up HSV 1/2, VDRL, HIV, hep C  - Treat UTI with CTX x3 days  - Thiamine 500 mg IV x 5 day  - Follow up neurology recs Metabolic vs Toxic  DDX: Infx (HSV encephalopathy vs UTI confirmed on UA) vs Toxin/Medication induced (patient unaware of home meds or pharmacy. No meds in surescripts.)  Pt w/ history of MDD per friend. Ddx includes Wernecke/Korsakoff encephalopathy in the setting of chronic EtOH use   CT Head reviewed, showed diffuse cerebral volume loss and extensive white matter changes  CTA H/N reviewed, showed no LVO or stenosis  utox negative, Hep C, VDRL negative.    - Follow up HSV 1/2, HIV  - Treat UTI with CTX x3 days (last day 11/6)  - Thiamine 500 mg IV x 5 day  - Follow up neurology recs Metabolic vs Toxic   Pt with cerebellar deficits on physical exam. Hep C, HSV, VDRL all negative. CT Head reviewed, showed diffuse cerebral volume loss and extensive white matter changes. CTA H/N reviewed, showed no LVO or stenosis. Pt w/ history of MDD per friend. Ddx includes Wernicke-Korsakoff encephalopathy in the setting of chronic EtOH use. B12 and folate wnl on blood work. Pending MRI. Symptoms unlikely to be due to UTI, however will complete antibiotic course.    - Treat UTI with CTX x3 days (last day 11/6)  - Thiamine 500 mg IV x 5 day  - Follow up neurology recs  - MRI pending

## 2022-11-05 NOTE — PROGRESS NOTE ADULT - PROBLEM SELECTOR PLAN 6
Patient in the 108, likely in the setting of pain +/- possible withdrawal.   -c/w to monitor  -address pain and withdrawal    #Diffuse T wave inversion   Trop negative x1, unknown cardiovascular history. As per friend not taking any medications.  -repeat EKG in the morning Patient tachy to 108, likely in the setting of pain. No acute alcohol withdrawal. Tachycardia has resolved. EKG NSR    -cont to monitor  -address pain prn    #Diffuse T wave inversion   Trop negative x1, unknown cardiovascular history. As per friend not taking any medications.  -repeat EKG in the morning, unchanged

## 2022-11-05 NOTE — PROGRESS NOTE ADULT - ASSESSMENT
This is a 62 F with PMHx of ETOH abuse (approx 6-8 beers daily, last drink 24 hours ago) brought in by friends for AMS and difficulty walking, falls. Currently admitted under medicine for AMS work up.   Neurology consulted for AMS work up.        This is a 62 F with PMHx of ETOH abuse (approx 6-8 beers daily, last drink 24 hours ago) brought in by friends for AMS and difficulty walking, falls. Currently admitted under medicine for AMS work up.   Neurology consulted for AMS work up.   On exam, patient is noted to have wide stance with broad base gait, with dysmetria. This is consistent for cerebellar ataxia, though cannot rule out wernicke. Imaging reviewed, consistent with lumbar stenosis, unlikely to be etiology but might be contributory    Recommendations  - Continue with high dose thiamine iv   - MRI head w/wo cont  - B12/Folate, TSH - wnl  - RPR - neg  - CIWA protocol  - Fall precautions  - PT/OT  - Continue rest of care as per primary team

## 2022-11-05 NOTE — PROGRESS NOTE ADULT - SUBJECTIVE AND OBJECTIVE BOX
Physical Medicine and Rehabilitation Progress Note :       Patient is a 62y old  Female who presents with a chief complaint of Altered Mental Status (05 Nov 2022 15:30)      HPI:  Emergency contact/child/HCP: Alexa Pineda [phone]    *HPI obtained from friend Theo Montes [phone]*  61 yo F poor historian PMHx of EtoH abuse (6 beers daily), MDD brought by friend/roommate for AMS, difficulty ambulating with recent falls, and inability of caring for herself. As per friend patient fell 1 month ago, with trauma to the head but unsure if the patient seeked medical attention at that time. Friend noted that last Sunday he found pt on the floor of her apartment (thinks she fell but not sure) pt did not let him help stating she just wanted to stretch so he left for work but when he returned 6+ hrs later she had not moved from that position. At that time he picked her up and placed on bed and since that event pt has been predominantly bedbound as not able to move 2/2 LE weakness.     Friend states since the weekend pt has been progressively altered, not eating or taking care of herself. He has been giving her approx 1-2 beers daily to "prevent alcohol withdrawal", last drink on 11/3 AM. Friend also reports patient has been confused and altered saying her mother is still alive, and saying people are around the house although there is none for the past 2 weeks. Friend unsure if patient has history of previous alcohol withdrawals, hospitalizations, seizures, but does report she has been doing cocaine "occasionally" as per the friend and she smokes cigarettes 1 pack per day. Per friend a month ago pt was AOX3 and able of caring for herself.     Pt reports having burning with urination, doesn't remember when symptoms started. Denies f/c, headache, neck/back pain, chest pain, sob, urinary/bowel incontinence, abd pain, nvd, numbness/weakness, paresthesia, saddle anesthesia.    ED course  VS: T 97.9, , /79, RR 18, SpO2 99% on RA  Labs: WBC 9.77, Hgb wnl, .3, PLt 143, K 2.8 --> 3.8, LFTs wnl, trop neg, alc<10  EKG: diffuse T wave inversion I, II, III, aVF, V3-V6  CT Head reviewed, showed diffuse cerebral volume loss and extensive white matter changes  CTA H/N reviewed, showed no LVO or stenosis  CT C-spine reviewed, showed multilevel degenerative disease without acute fractures  CXR showed no acute cardiopulmonary process  Interventions: Magnesium 2 mg IV x1, KCl 20 mEq IV x3 doses, KCl 40 mEq PO x 1, Lorazepam 1 mg IV x1, Thiamine 100 mg IV x1, 1L NS, IV acyclovir for 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   (04 Nov 2022 01:08)                            11.5   11.35 )-----------( 149      ( 05 Nov 2022 05:30 )             34.7       11-05    142  |  105  |  8   ----------------------------<  106<H>  4.0   |  27  |  0.76    Ca    9.0      05 Nov 2022 05:30  Phos  3.2     11-05  Mg     1.8     11-05    TPro  6.4  /  Alb  3.5  /  TBili  0.3  /  DBili  x   /  AST  22  /  ALT  12  /  AlkPhos  78  11-04    Vital Signs Last 24 Hrs  T(C): 36.7 (05 Nov 2022 07:00), Max: 36.7 (05 Nov 2022 05:09)  T(F): 98 (05 Nov 2022 07:00), Max: 98.1 (05 Nov 2022 05:09)  HR: 88 (05 Nov 2022 07:00) (78 - 105)  BP: 127/78 (05 Nov 2022 07:00) (127/78 - 159/85)  BP(mean): --  RR: 18 (05 Nov 2022 07:00) (18 - 18)  SpO2: 99% (05 Nov 2022 07:00) (98% - 100%)    Parameters below as of 05 Nov 2022 07:00  Patient On (Oxygen Delivery Method): room air        MEDICATIONS  (STANDING):  cefTRIAXone   IVPB 1000 milliGRAM(s) IV Intermittent every 24 hours  enoxaparin Injectable 40 milliGRAM(s) SubCutaneous every 24 hours  folic acid 1 milliGRAM(s) Oral daily  thiamine IVPB 500 milliGRAM(s) IV Intermittent daily    MEDICATIONS  (PRN):  acetaminophen     Tablet .. 650 milliGRAM(s) Oral every 6 hours PRN Temp greater or equal to 38C (100.4F), Mild Pain (1 - 3)  aluminum hydroxide/magnesium hydroxide/simethicone Suspension 30 milliLiter(s) Oral every 4 hours PRN Dyspepsia  LORazepam   Injectable 2 milliGRAM(s) IV Push every 1 hour PRN CIWA-Ar score 8 or greater  melatonin 3 milliGRAM(s) Oral at bedtime PRN Insomnia  ondansetron Injectable 4 milliGRAM(s) IV Push every 8 hours PRN Nausea and/or Vomiting       initial Physical Therapy Functional Status Assessment :       Previous Level of Function:     · Ambulation Skills	independent; needs device  · Transfer Skills	independent; needs device  · ADL Skills	independent; needs device  · Work/Leisure Activity	independent; needs device  · Additional Comments	Pt lives in an elevator access apartment with roommate. As per brother, patient was independent with all ADLs and IADLs prior to admission and was able to ambulate with SC independently.    Cognitive Status Examination:   · Orientation	person; place  · Level of Consciousness	confused; alert  · Follows Commands and Answers Questions	100% of the time    Range of Motion Exam:   · Range of Motion Examination	bilateral upper extremity ROM was WFL (within functional limits); bilateral lower extremity ROM was WFL (within functional limits)    Manual Muscle Testing:   · Manual Muscle Testing Results	grossly assessed due to  at least 3/5 BL UE & LE based on antigravity mobility.    Bed Mobility: Rolling/Turning:     · Level of [place]	contact guard  · Physical Assist/Nonphysical Assist	1 person assist    Bed Mobility: Scooting/Bridging:     · Level of [place]	contact guard  · Physical Assist/Nonphysical Assist	1 person assist    Bed Mobility: Sit to Supine:     · Level of [place]	contact guard  · Physical Assist/Nonphysical Assist	1 person assist    Bed Mobility: Supine to Sit:     · Level of [place]	contact guard  · Physical Assist/Nonphysical Assist	1 person assist    Bed Mobility Analysis:     · Bed Mobility Limitations	decreased ability to use legs for bridging/pushing  · Impairments Contributing to Impaired Bed Mobility	impaired balance; decreased strength    Transfer: Sit to Stand:     · Level of [place]	contact guard  · Physical Assist/Nonphysical Assist	1 person assist  · Weight-Bearing Restrictions	weight-bearing as tolerated  · Assistive Device	rolling walker    Transfer: Stand to Sit:     · Level of [place]	contact guard  · Physical Assist/Nonphysical Assist	1 person assist  · Weight-Bearing Restrictions	weight-bearing as tolerated  · Assistive Device	rolling walker    Sit/Stand Transfer Safety Analysis:     · Impairments Contributing to Impaired Transfers	impaired balance; decreased strength    Gait Skills:     · Level of [place]	contact guard  · Physical Assist/Nonphysical Assist	verbal cues; 1 person assist  · Weight-Bearing Restrictions	weight-bearing as tolerated  · Assistive Device	rolling walker  · Gait Distance	30ft    Gait Analysis:     · Gait Pattern Used	2-point gait  · Gait Deviations Noted	decreased helio; decreased step length; decreased stride length; decreased weight-shifting ability  · Impairments Contributing to Gait Deviations	impaired balance; decreased strength    Balance Skills Assessment:     · Sitting Balance: Static	normal balance  · Sitting Balance: Dynamic	normal balance  · Sit-to-Stand Balance	good minus  · Standing Balance: Static	fair balance  · Standing Balance: Dynamic	fair balance    Clinical Impressions:   · Criteria for Skilled Therapeutic Interventions	impairments found; rehab potential; anticipated equipment needs at discharge; functional limitations in following categories; therapy frequency; predicted duration of therapy intervention; anticipated discharge recommendation  · Impairments Found (describe specific impairments)	aerobic capacity/endurance; gait, locomotion, and balance; muscle strength; posture; ROM  · Functional Limitations in Following Categories (describe specific limitations)	self-care; home management; community/leisure  · Rehab Potential	good, to achieve stated therapy goals  · Therapy Frequency	2-3x/week  · Physical Therapy DME Recommendations	rolling walker        PM&R Impression : as above    Current Disposition Plan Recommendations :    subacute rehab placement

## 2022-11-06 LAB
-  AMPICILLIN/SULBACTAM: SIGNIFICANT CHANGE UP
-  AMPICILLIN: SIGNIFICANT CHANGE UP
-  CEFAZOLIN: SIGNIFICANT CHANGE UP
-  CEFTRIAXONE: SIGNIFICANT CHANGE UP
-  CIPROFLOXACIN: SIGNIFICANT CHANGE UP
-  ERTAPENEM: SIGNIFICANT CHANGE UP
-  GENTAMICIN: SIGNIFICANT CHANGE UP
-  NITROFURANTOIN: SIGNIFICANT CHANGE UP
-  PIPERACILLIN/TAZOBACTAM: SIGNIFICANT CHANGE UP
-  TOBRAMYCIN: SIGNIFICANT CHANGE UP
-  TRIMETHOPRIM/SULFAMETHOXAZOLE: SIGNIFICANT CHANGE UP
ANION GAP SERPL CALC-SCNC: 9 MMOL/L — SIGNIFICANT CHANGE UP (ref 5–17)
BASOPHILS # BLD AUTO: 0.05 K/UL — SIGNIFICANT CHANGE UP (ref 0–0.2)
BASOPHILS NFR BLD AUTO: 0.5 % — SIGNIFICANT CHANGE UP (ref 0–2)
BUN SERPL-MCNC: 9 MG/DL — SIGNIFICANT CHANGE UP (ref 7–23)
CALCIUM SERPL-MCNC: 9 MG/DL — SIGNIFICANT CHANGE UP (ref 8.4–10.5)
CHLORIDE SERPL-SCNC: 101 MMOL/L — SIGNIFICANT CHANGE UP (ref 96–108)
CO2 SERPL-SCNC: 29 MMOL/L — SIGNIFICANT CHANGE UP (ref 22–31)
CREAT SERPL-MCNC: 0.61 MG/DL — SIGNIFICANT CHANGE UP (ref 0.5–1.3)
CULTURE RESULTS: SIGNIFICANT CHANGE UP
EGFR: 100 ML/MIN/1.73M2 — SIGNIFICANT CHANGE UP
EOSINOPHIL # BLD AUTO: 0.08 K/UL — SIGNIFICANT CHANGE UP (ref 0–0.5)
EOSINOPHIL NFR BLD AUTO: 0.9 % — SIGNIFICANT CHANGE UP (ref 0–6)
GLUCOSE SERPL-MCNC: 104 MG/DL — HIGH (ref 70–99)
HCT VFR BLD CALC: 33.6 % — LOW (ref 34.5–45)
HGB BLD-MCNC: 11 G/DL — LOW (ref 11.5–15.5)
IMM GRANULOCYTES NFR BLD AUTO: 1.2 % — HIGH (ref 0–0.9)
LYMPHOCYTES # BLD AUTO: 2.91 K/UL — SIGNIFICANT CHANGE UP (ref 1–3.3)
LYMPHOCYTES # BLD AUTO: 31.7 % — SIGNIFICANT CHANGE UP (ref 13–44)
MAGNESIUM SERPL-MCNC: 1.1 MG/DL — LOW (ref 1.6–2.6)
MCHC RBC-ENTMCNC: 32.7 GM/DL — SIGNIFICANT CHANGE UP (ref 32–36)
MCHC RBC-ENTMCNC: 34 PG — SIGNIFICANT CHANGE UP (ref 27–34)
MCV RBC AUTO: 103.7 FL — HIGH (ref 80–100)
METHOD TYPE: SIGNIFICANT CHANGE UP
MONOCYTES # BLD AUTO: 0.7 K/UL — SIGNIFICANT CHANGE UP (ref 0–0.9)
MONOCYTES NFR BLD AUTO: 7.6 % — SIGNIFICANT CHANGE UP (ref 2–14)
NEUTROPHILS # BLD AUTO: 5.32 K/UL — SIGNIFICANT CHANGE UP (ref 1.8–7.4)
NEUTROPHILS NFR BLD AUTO: 58.1 % — SIGNIFICANT CHANGE UP (ref 43–77)
NRBC # BLD: 0 /100 WBCS — SIGNIFICANT CHANGE UP (ref 0–0)
ORGANISM # SPEC MICROSCOPIC CNT: SIGNIFICANT CHANGE UP
ORGANISM # SPEC MICROSCOPIC CNT: SIGNIFICANT CHANGE UP
PHOSPHATE SERPL-MCNC: 3.5 MG/DL — SIGNIFICANT CHANGE UP (ref 2.5–4.5)
PLATELET # BLD AUTO: 135 K/UL — LOW (ref 150–400)
POTASSIUM SERPL-MCNC: 3.5 MMOL/L — SIGNIFICANT CHANGE UP (ref 3.5–5.3)
POTASSIUM SERPL-SCNC: 3.5 MMOL/L — SIGNIFICANT CHANGE UP (ref 3.5–5.3)
RBC # BLD: 3.24 M/UL — LOW (ref 3.8–5.2)
RBC # FLD: 12.5 % — SIGNIFICANT CHANGE UP (ref 10.3–14.5)
SODIUM SERPL-SCNC: 139 MMOL/L — SIGNIFICANT CHANGE UP (ref 135–145)
SPECIMEN SOURCE: SIGNIFICANT CHANGE UP
WBC # BLD: 9.17 K/UL — SIGNIFICANT CHANGE UP (ref 3.8–10.5)
WBC # FLD AUTO: 9.17 K/UL — SIGNIFICANT CHANGE UP (ref 3.8–10.5)

## 2022-11-06 PROCEDURE — 99232 SBSQ HOSP IP/OBS MODERATE 35: CPT

## 2022-11-06 PROCEDURE — 99232 SBSQ HOSP IP/OBS MODERATE 35: CPT | Mod: GC

## 2022-11-06 RX ORDER — THIAMINE MONONITRATE (VIT B1) 100 MG
500 TABLET ORAL EVERY 8 HOURS
Refills: 0 | Status: COMPLETED | OUTPATIENT
Start: 2022-11-06 | End: 2022-11-07

## 2022-11-06 RX ORDER — POTASSIUM CHLORIDE 20 MEQ
20 PACKET (EA) ORAL
Refills: 0 | Status: COMPLETED | OUTPATIENT
Start: 2022-11-06 | End: 2022-11-06

## 2022-11-06 RX ORDER — MAGNESIUM SULFATE 500 MG/ML
2 VIAL (ML) INJECTION ONCE
Refills: 0 | Status: COMPLETED | OUTPATIENT
Start: 2022-11-06 | End: 2022-11-06

## 2022-11-06 RX ADMIN — Medication 25 GRAM(S): at 11:04

## 2022-11-06 RX ADMIN — Medication 1 MILLIGRAM(S): at 11:04

## 2022-11-06 RX ADMIN — Medication 20 MILLIEQUIVALENT(S): at 14:00

## 2022-11-06 RX ADMIN — Medication 20 MILLIEQUIVALENT(S): at 11:04

## 2022-11-06 RX ADMIN — ENOXAPARIN SODIUM 40 MILLIGRAM(S): 100 INJECTION SUBCUTANEOUS at 06:26

## 2022-11-06 RX ADMIN — Medication 105 MILLIGRAM(S): at 12:00

## 2022-11-06 RX ADMIN — CEFTRIAXONE 100 MILLIGRAM(S): 500 INJECTION, POWDER, FOR SOLUTION INTRAMUSCULAR; INTRAVENOUS at 06:26

## 2022-11-06 NOTE — PROGRESS NOTE ADULT - SUBJECTIVE AND OBJECTIVE BOX
Neurology Progress Note    Interval History:    No events overnight. Pt resting comfortably in bed this morning.    Medications:  acetaminophen     Tablet .. 650 milliGRAM(s) Oral every 6 hours PRN  aluminum hydroxide/magnesium hydroxide/simethicone Suspension 30 milliLiter(s) Oral every 4 hours PRN  enoxaparin Injectable 40 milliGRAM(s) SubCutaneous every 24 hours  folic acid 1 milliGRAM(s) Oral daily  LORazepam   Injectable 2 milliGRAM(s) IV Push every 1 hour PRN  melatonin 3 milliGRAM(s) Oral at bedtime PRN  ondansetron Injectable 4 milliGRAM(s) IV Push every 8 hours PRN  potassium chloride    Tablet ER 20 milliEquivalent(s) Oral every 2 hours  thiamine IVPB 500 milliGRAM(s) IV Intermittent daily      Vital Signs Last 24 Hrs  T(C): 36.7 (06 Nov 2022 11:12), Max: 37 (05 Nov 2022 21:27)  T(F): 98 (06 Nov 2022 11:12), Max: 98.6 (05 Nov 2022 21:27)  HR: 81 (06 Nov 2022 11:12) (80 - 85)  BP: 128/76 (06 Nov 2022 11:12) (128/76 - 138/78)  BP(mean): --  RR: 20 (06 Nov 2022 11:12) (18 - 20)  SpO2: 98% (06 Nov 2022 11:12) (97% - 98%)    Parameters below as of 06 Nov 2022 11:12  Patient On (Oxygen Delivery Method): room air        Neurological Examination:  General:  Appearance is consistent with chronologic age.  No abnormal facies.  Gross skin survey within normal limits.    Cognitive/Language:  Awake, alert, and oriented to person, but not place, time, or date.  Nondysarthric.    Cranial Nerves  - Eyes: Visual acuity intact, Visual fields full.  B/l beating nystagmus with upgaze.  - Face:  Facial sensation normal V1 - 3, no facial asymmetry.    - Ears/Nose/Throat:  Hearing grossly intact   Motor examination:  Upper Extremities: L 5/5, R 5/5; Lower extremities: L 5/5, R 5/5.  No observable drift. Normal tone and bulk. No tenderness, twitching, tremors or involuntary movements.  Sensory examination:   Intact to light touch throughout  Reflexes:   2+ b/l biceps, brachioradialis, patella  Cerebellum:   B/l UE dysmetria and dysdiadokinesia (L>R).  Gait wide-based, unable to take more than one step    Labs:  CBC Full  -  ( 06 Nov 2022 05:30 )  WBC Count : 9.17 K/uL  RBC Count : 3.24 M/uL  Hemoglobin : 11.0 g/dL  Hematocrit : 33.6 %  Platelet Count - Automated : 135 K/uL  Mean Cell Volume : 103.7 fl  Mean Cell Hemoglobin : 34.0 pg  Mean Cell Hemoglobin Concentration : 32.7 gm/dL  Auto Neutrophil # : 5.32 K/uL  Auto Lymphocyte # : 2.91 K/uL  Auto Monocyte # : 0.70 K/uL  Auto Eosinophil # : 0.08 K/uL  Auto Basophil # : 0.05 K/uL  Auto Neutrophil % : 58.1 %  Auto Lymphocyte % : 31.7 %  Auto Monocyte % : 7.6 %  Auto Eosinophil % : 0.9 %  Auto Basophil % : 0.5 %    11-06    139  |  101  |  9   ----------------------------<  104<H>  3.5   |  29  |  0.61    Ca    9.0      06 Nov 2022 05:30  Phos  3.5     11-06  Mg     1.1     11-06    TPro  6.4  /  Alb  3.5  /  TBili  0.3  /  DBili  x   /  AST  22  /  ALT  12  /  AlkPhos  78  11-04    LIVER FUNCTIONS - ( 04 Nov 2022 15:23 )  Alb: 3.5 g/dL / Pro: 6.4 g/dL / ALK PHOS: 78 U/L / ALT: 12 U/L / AST: 22 U/L / GGT: x                Neurology Progress Note    Interval History:    No events overnight. Pt resting comfortably in bed this morning, no complaints    Medications:  acetaminophen     Tablet .. 650 milliGRAM(s) Oral every 6 hours PRN  aluminum hydroxide/magnesium hydroxide/simethicone Suspension 30 milliLiter(s) Oral every 4 hours PRN  enoxaparin Injectable 40 milliGRAM(s) SubCutaneous every 24 hours  folic acid 1 milliGRAM(s) Oral daily  LORazepam   Injectable 2 milliGRAM(s) IV Push every 1 hour PRN  melatonin 3 milliGRAM(s) Oral at bedtime PRN  ondansetron Injectable 4 milliGRAM(s) IV Push every 8 hours PRN  potassium chloride    Tablet ER 20 milliEquivalent(s) Oral every 2 hours  thiamine IVPB 500 milliGRAM(s) IV Intermittent daily      Vital Signs Last 24 Hrs  T(C): 36.7 (06 Nov 2022 11:12), Max: 37 (05 Nov 2022 21:27)  T(F): 98 (06 Nov 2022 11:12), Max: 98.6 (05 Nov 2022 21:27)  HR: 81 (06 Nov 2022 11:12) (80 - 85)  BP: 128/76 (06 Nov 2022 11:12) (128/76 - 138/78)  BP(mean): --  RR: 20 (06 Nov 2022 11:12) (18 - 20)  SpO2: 98% (06 Nov 2022 11:12) (97% - 98%)    Parameters below as of 06 Nov 2022 11:12  Patient On (Oxygen Delivery Method): room air        Neurological Examination:  General:  Appearance is consistent with chronologic age.  No abnormal facies.  Gross skin survey within normal limits.    Cognitive/Language:  Awake, alert, and oriented to person, but not place, time, or date.  Nondysarthric.    Cranial Nerves  - Eyes: Visual acuity intact, Visual fields full.  B/l upbeating nystagmus with upgaze.  - Face:  Facial sensation normal V1 - 3, no facial asymmetry.    - Ears/Nose/Throat:  Hearing grossly intact   Motor examination:  Upper Extremities: L 5/5, R 5/5; Lower extremities: L 5/5, R 5/5.  No observable drift. Normal tone and bulk. No tenderness, twitching, tremors or involuntary movements.  Sensory examination:   Intact to light touch throughout  Reflexes:   2+ b/l biceps, brachioradialis, patella  Cerebellum:  mild B/l UE dysmetria and dysdiadokokinesia (L>R).  Gait very wide-based, unable to take more than one step    Labs:  CBC Full  -  ( 06 Nov 2022 05:30 )  WBC Count : 9.17 K/uL  RBC Count : 3.24 M/uL  Hemoglobin : 11.0 g/dL  Hematocrit : 33.6 %  Platelet Count - Automated : 135 K/uL  Mean Cell Volume : 103.7 fl  Mean Cell Hemoglobin : 34.0 pg  Mean Cell Hemoglobin Concentration : 32.7 gm/dL  Auto Neutrophil # : 5.32 K/uL  Auto Lymphocyte # : 2.91 K/uL  Auto Monocyte # : 0.70 K/uL  Auto Eosinophil # : 0.08 K/uL  Auto Basophil # : 0.05 K/uL  Auto Neutrophil % : 58.1 %  Auto Lymphocyte % : 31.7 %  Auto Monocyte % : 7.6 %  Auto Eosinophil % : 0.9 %  Auto Basophil % : 0.5 %    11-06    139  |  101  |  9   ----------------------------<  104<H>  3.5   |  29  |  0.61    Ca    9.0      06 Nov 2022 05:30  Phos  3.5     11-06  Mg     1.1     11-06    TPro  6.4  /  Alb  3.5  /  TBili  0.3  /  DBili  x   /  AST  22  /  ALT  12  /  AlkPhos  78  11-04    LIVER FUNCTIONS - ( 04 Nov 2022 15:23 )  Alb: 3.5 g/dL / Pro: 6.4 g/dL / ALK PHOS: 78 U/L / ALT: 12 U/L / AST: 22 U/L / GGT: x

## 2022-11-06 NOTE — PROGRESS NOTE ADULT - ASSESSMENT
62F with PMH of ETOH abuse (approx 6-8 beers daily, last drink 24 hours ago) brought in by friends for AMS and difficulty walking, falls. Currently admitted under medicine for AMS work up. Neurology consulted for recommendations.     Impression  - Given history of alcohol use and exam findings, etiology of symptoms most consistent with alcohol related cerebellar dysfunction / wernicke encephalopathy spectrum. Today's exam is notable for Nystagmus with upgaze b/l.     Recommendations  - Continue with high dose thiamine IV  - Obtain MRI head w/wo cont  - B12/Folate, TSH - wnl  - RPR - neg  - Continue CIWA protocol  - Fall precautions  - PT/OT  - Continue rest of care as per primary team    Plan discussed with Attending Dr. Alise Jim MD  Neurology, PGY-3 62F with PMH of ETOH abuse (approx 6-8 beers daily, last drink 24 hours ago) brought in by friends for AMS and difficulty walking, falls. Currently admitted under medicine for AMS work up. Neurology consulted for recommendations.     Impression  - Given history of alcohol use and exam findings, etiology of symptoms most consistent with alcohol related cerebellar dysfunction / wernicke encephalopathy spectrum. Today's exam is notable for Nystagmus with upgaze b/l.     Recommendations  - Continue with high dose thiamine IV  - f/u MRI head w/wo cont  - B12/Folate, TSH - wnl  - RPR - neg  - Continue CIWA protocol  - Fall precautions  - PT/OT  - Continue rest of care as per primary team    Plan discussed with Attending Dr. Alise Jim MD  Neurology, PGY-3

## 2022-11-06 NOTE — PROGRESS NOTE ADULT - ATTENDING COMMENTS
Patient called with her fasting glucose level today 290  Patient does have Metformin HCL 1000 mg on hand  She used to take it in the past BID  Please review with JACINTO Cadena Please call patient back at [phone]  Today on exam she has upbeating nystagmus in upgaze, which along with dysmetria/ataxia, altered mental status, and history of alcohol use makes wernicke encephalopathy the most likely etiology of her symptoms  limb dysmetria is slightly better today, however her gait is still severely wide based and she cannot walk without assistance  Continue thiamine  f/u MRI brain   dispo planning to rehab

## 2022-11-06 NOTE — PROGRESS NOTE ADULT - ASSESSMENT
62F poor historian PMHx of EtoH abuse (6 beers daily), MDD bib friend/roommate for AMS, difficulty ambulating and inability of caring for herself s/p recent falls, admitted for encephalopathy concerning for Wernicke encephalopathy and inability to ambulate 2/2 LE weakness, found with UTI    -Ceftriaxone 1gm iv daily ( 11/4-) Day 3 today   - f/u UCx  - CIWA 0 this am  - Neuro f/u appreciated   - MRI brain pending   - on Thiamine 500mg iv daily 9 11/4-), will increase to 500mg iv tid for 2 consecutive days then 250mg iv daily for 5 days ,please check with Neuro team   - Folic acid 1mg po daily   - OOBTC with assistance   - Rehab consult appreciated rec: [facility] however pt's daughter wants pt closer to where she lives  - POC as d/w Dr.Dinesh Jones

## 2022-11-06 NOTE — PROGRESS NOTE ADULT - SUBJECTIVE AND OBJECTIVE BOX
Patient is a 62y old  Female who presents with a chief complaint of Altered Mental Status (05 Nov 2022 15:30)    INTERVAL EVENTS: NAEON     SUBJECTIVE:  Patient was seen and examined at bedside. Denies dysuria or urinary frequency. Pt appears confused from time to time as per 7WO resident Dr. Jones.     Review of systems: No fever, chills, dizziness, HA, Changes in vision, CP, dyspnea, nausea or vomiting, dysuria, changes in bowel movements, LE edema. Rest of 12 point Review of systems negative unless otherwise documented elsewhere in note.     Diet, Regular (11-04-22 @ 11:12) [Active]      MEDICATIONS:  MEDICATIONS  (STANDING):  enoxaparin Injectable 40 milliGRAM(s) SubCutaneous every 24 hours  folic acid 1 milliGRAM(s) Oral daily  potassium chloride    Tablet ER 20 milliEquivalent(s) Oral every 2 hours  thiamine IVPB 500 milliGRAM(s) IV Intermittent daily    MEDICATIONS  (PRN):  acetaminophen     Tablet .. 650 milliGRAM(s) Oral every 6 hours PRN Temp greater or equal to 38C (100.4F), Mild Pain (1 - 3)  aluminum hydroxide/magnesium hydroxide/simethicone Suspension 30 milliLiter(s) Oral every 4 hours PRN Dyspepsia  LORazepam   Injectable 2 milliGRAM(s) IV Push every 1 hour PRN CIWA-Ar score 8 or greater  melatonin 3 milliGRAM(s) Oral at bedtime PRN Insomnia  ondansetron Injectable 4 milliGRAM(s) IV Push every 8 hours PRN Nausea and/or Vomiting      Allergies    No Known Allergies    Intolerances        OBJECTIVE:  Vital Signs Last 24 Hrs  T(C): 36.7 (06 Nov 2022 11:12), Max: 37 (05 Nov 2022 21:27)  T(F): 98 (06 Nov 2022 11:12), Max: 98.6 (05 Nov 2022 21:27)  HR: 81 (06 Nov 2022 11:12) (80 - 85)  BP: 128/76 (06 Nov 2022 11:12) (128/76 - 138/78)  BP(mean): --  RR: 20 (06 Nov 2022 11:12) (18 - 20)  SpO2: 98% (06 Nov 2022 11:12) (97% - 98%)    Parameters below as of 06 Nov 2022 11:12  Patient On (Oxygen Delivery Method): room air      I&O's Summary      PHYSICAL EXAM:  Gen: Reclining in bed at time of exam, appears stated age  HEENT: NCAT, MMM, clear OP  Neck: supple, trachea at midline  CV: RRR, +S1/S2  Pulm: adequate respiratory effort, no increase in work of breathing  Abd: soft, NTND  Skin: warm and dry,   Ext: WWP, no LE edema  Neuro: AOx3, no gross focal neurological deficits  Psych: affect and behavior appropriate, pleasant at time of interview  :     LABS:                        11.0   9.17  )-----------( 135      ( 06 Nov 2022 05:30 )             33.6     11-06    139  |  101  |  9   ----------------------------<  104<H>  3.5   |  29  |  0.61    Ca    9.0      06 Nov 2022 05:30  Phos  3.5     11-06  Mg     1.1     11-06    TPro  6.4  /  Alb  3.5  /  TBili  0.3  /  DBili  x   /  AST  22  /  ALT  12  /  AlkPhos  78  11-04    LIVER FUNCTIONS - ( 04 Nov 2022 15:23 )  Alb: 3.5 g/dL / Pro: 6.4 g/dL / ALK PHOS: 78 U/L / ALT: 12 U/L / AST: 22 U/L / GGT: x             CAPILLARY BLOOD GLUCOSE            MICRODATA:    Culture - Urine (collected 04 Nov 2022 01:28)  Source: Clean Catch None  Final Report (06 Nov 2022 11:58):    >100,000 CFU/ml Klebsiella pneumoniae  Organism: Klebsiella pneumoniae (06 Nov 2022 11:58)  Organism: Klebsiella pneumoniae (06 Nov 2022 11:58)    Urinalysis with Rflx Culture (collected 04 Nov 2022 01:28)        RADIOLOGY/OTHER STUDIES:

## 2022-11-07 LAB
ALBUMIN SERPL ELPH-MCNC: 3.1 G/DL — LOW (ref 3.3–5)
ALP SERPL-CCNC: 67 U/L — SIGNIFICANT CHANGE UP (ref 40–120)
ALT FLD-CCNC: 18 U/L — SIGNIFICANT CHANGE UP (ref 10–45)
ANION GAP SERPL CALC-SCNC: 10 MMOL/L — SIGNIFICANT CHANGE UP (ref 5–17)
AST SERPL-CCNC: 31 U/L — SIGNIFICANT CHANGE UP (ref 10–40)
BASOPHILS # BLD AUTO: 0.03 K/UL — SIGNIFICANT CHANGE UP (ref 0–0.2)
BASOPHILS NFR BLD AUTO: 0.4 % — SIGNIFICANT CHANGE UP (ref 0–2)
BILIRUB SERPL-MCNC: <0.2 MG/DL — SIGNIFICANT CHANGE UP (ref 0.2–1.2)
BUN SERPL-MCNC: 14 MG/DL — SIGNIFICANT CHANGE UP (ref 7–23)
CALCIUM SERPL-MCNC: 9.4 MG/DL — SIGNIFICANT CHANGE UP (ref 8.4–10.5)
CHLORIDE SERPL-SCNC: 100 MMOL/L — SIGNIFICANT CHANGE UP (ref 96–108)
CO2 SERPL-SCNC: 25 MMOL/L — SIGNIFICANT CHANGE UP (ref 22–31)
CREAT SERPL-MCNC: 0.79 MG/DL — SIGNIFICANT CHANGE UP (ref 0.5–1.3)
EGFR: 84 ML/MIN/1.73M2 — SIGNIFICANT CHANGE UP
EOSINOPHIL # BLD AUTO: 0.05 K/UL — SIGNIFICANT CHANGE UP (ref 0–0.5)
EOSINOPHIL NFR BLD AUTO: 0.7 % — SIGNIFICANT CHANGE UP (ref 0–6)
GLUCOSE SERPL-MCNC: 118 MG/DL — HIGH (ref 70–99)
HCT VFR BLD CALC: 32.8 % — LOW (ref 34.5–45)
HGB BLD-MCNC: 10.8 G/DL — LOW (ref 11.5–15.5)
IMM GRANULOCYTES NFR BLD AUTO: 1.2 % — HIGH (ref 0–0.9)
LYMPHOCYTES # BLD AUTO: 1.95 K/UL — SIGNIFICANT CHANGE UP (ref 1–3.3)
LYMPHOCYTES # BLD AUTO: 26.3 % — SIGNIFICANT CHANGE UP (ref 13–44)
MAGNESIUM SERPL-MCNC: 1.3 MG/DL — LOW (ref 1.6–2.6)
MCHC RBC-ENTMCNC: 32.9 GM/DL — SIGNIFICANT CHANGE UP (ref 32–36)
MCHC RBC-ENTMCNC: 34.3 PG — HIGH (ref 27–34)
MCV RBC AUTO: 104.1 FL — HIGH (ref 80–100)
MONOCYTES # BLD AUTO: 0.74 K/UL — SIGNIFICANT CHANGE UP (ref 0–0.9)
MONOCYTES NFR BLD AUTO: 10 % — SIGNIFICANT CHANGE UP (ref 2–14)
NEUTROPHILS # BLD AUTO: 4.56 K/UL — SIGNIFICANT CHANGE UP (ref 1.8–7.4)
NEUTROPHILS NFR BLD AUTO: 61.4 % — SIGNIFICANT CHANGE UP (ref 43–77)
NRBC # BLD: 0 /100 WBCS — SIGNIFICANT CHANGE UP (ref 0–0)
PHOSPHATE SERPL-MCNC: 3.3 MG/DL — SIGNIFICANT CHANGE UP (ref 2.5–4.5)
PLATELET # BLD AUTO: 149 K/UL — LOW (ref 150–400)
POTASSIUM SERPL-MCNC: 4.7 MMOL/L — SIGNIFICANT CHANGE UP (ref 3.5–5.3)
POTASSIUM SERPL-SCNC: 4.7 MMOL/L — SIGNIFICANT CHANGE UP (ref 3.5–5.3)
PROT SERPL-MCNC: 6.2 G/DL — SIGNIFICANT CHANGE UP (ref 6–8.3)
RBC # BLD: 3.15 M/UL — LOW (ref 3.8–5.2)
RBC # FLD: 12.8 % — SIGNIFICANT CHANGE UP (ref 10.3–14.5)
SODIUM SERPL-SCNC: 135 MMOL/L — SIGNIFICANT CHANGE UP (ref 135–145)
WBC # BLD: 7.42 K/UL — SIGNIFICANT CHANGE UP (ref 3.8–10.5)
WBC # FLD AUTO: 7.42 K/UL — SIGNIFICANT CHANGE UP (ref 3.8–10.5)

## 2022-11-07 PROCEDURE — 70553 MRI BRAIN STEM W/O & W/DYE: CPT | Mod: 26

## 2022-11-07 PROCEDURE — 99232 SBSQ HOSP IP/OBS MODERATE 35: CPT | Mod: GC

## 2022-11-07 PROCEDURE — 93306 TTE W/DOPPLER COMPLETE: CPT | Mod: 26

## 2022-11-07 RX ORDER — MAGNESIUM SULFATE 500 MG/ML
4 VIAL (ML) INJECTION ONCE
Refills: 0 | Status: COMPLETED | OUTPATIENT
Start: 2022-11-07 | End: 2022-11-07

## 2022-11-07 RX ORDER — MAGNESIUM OXIDE 400 MG ORAL TABLET 241.3 MG
400 TABLET ORAL EVERY 24 HOURS
Refills: 0 | Status: DISCONTINUED | OUTPATIENT
Start: 2022-11-07 | End: 2022-11-12

## 2022-11-07 RX ADMIN — Medication 105 MILLIGRAM(S): at 07:22

## 2022-11-07 RX ADMIN — Medication 105 MILLIGRAM(S): at 16:30

## 2022-11-07 RX ADMIN — Medication 105 MILLIGRAM(S): at 22:42

## 2022-11-07 RX ADMIN — Medication 25 GRAM(S): at 12:02

## 2022-11-07 RX ADMIN — Medication 1 MILLIGRAM(S): at 12:02

## 2022-11-07 RX ADMIN — MAGNESIUM OXIDE 400 MG ORAL TABLET 400 MILLIGRAM(S): 241.3 TABLET ORAL at 18:38

## 2022-11-07 NOTE — PROGRESS NOTE ADULT - PROBLEM SELECTOR PLAN 4
Per friend, pt with progressive LE weakness. CT T/L negative for any acute pathology  B12, folate wnl. Pending TSH. LE weakness improved on physical exam this AM.     Per neuro, patient unable to stand without assistance. PT eval for [facility]. Pending MRI for likely Wernicke-Korsakoff encephalopathy.    - f/u Neuro recs  - F/u MRI

## 2022-11-07 NOTE — PROGRESS NOTE ADULT - PROBLEM SELECTOR PLAN 5
CTH unremarkable. CT T/L Negative.    -Follow up Neuro recs  -Orthostatic BP once able/pt stable, pt remains unable to walk  - PT consult, rec [facility]

## 2022-11-07 NOTE — PROGRESS NOTE ADULT - SUBJECTIVE AND OBJECTIVE BOX
INTERVAL HPI/OVERNIGHT EVENTS:  Patient was seen and examined at bedside. As per nurse, patient initially refused thiamine overnight and was more aggressively confused. This AM, patient resting comfortably, mildly confused stating that she believes she's still in the [place]. IV thiamine running.  Patient understanding of the need for thiamine supplement and amenable to MRI later this afternoon. No complaints at this time. Patient denies: fever, chills, lightheadedness, weakness, CP, palpitations, SOB, cough, N/V. ROS otherwise negative.    VITAL SIGNS:  T(F): 98.3 (11-07-22 @ 05:53)  HR: 82 (11-07-22 @ 05:53)  BP: 138/85 (11-07-22 @ 05:53)  RR: 18 (11-07-22 @ 05:53)  SpO2: 98% (11-07-22 @ 05:53)  Wt(kg): --        PHYSICAL EXAM:    Constitutional: thin elderly female resting comfortably in bed; NAD  HEENT: NC/AT, PER, anicteric sclera, no nasal discharge; dry MM  Neck: supple; no JVD or thyromegaly, no lymphadenopathy  Respiratory: CTA B/L; no W/R/R, no retractions  Cardiac: +S1/S2; RRR; no M/R/G  Gastrointestinal: soft, NT/ND; no rebound or guarding  Back: spine midline, no bony tenderness, stage 3 sacral ulcer  Extremities: WWP, no clubbing or cyanosis; no peripheral edema  Musculoskeletal: NROM x4; no joint swelling, tenderness or erythema. Normal  strength. 3/5 muscle strength b/l LE  Vascular: 2+ radial, DP/PT pulses B/L  Neurologic: AAOx2; Can move all extremities. No observed focal deficits. Sensation intact throughout. Dysmetria on cerebellar exam. No vertical nystagmus observed this AM  Psychiatric: affect and characteristics of appearance, verbalizations, behaviors are appropriate.      MEDICATIONS  (STANDING):  enoxaparin Injectable 40 milliGRAM(s) SubCutaneous every 24 hours  folic acid 1 milliGRAM(s) Oral daily  magnesium sulfate  IVPB 4 Gram(s) IV Intermittent once  thiamine IVPB 500 milliGRAM(s) IV Intermittent every 8 hours    MEDICATIONS  (PRN):  acetaminophen     Tablet .. 650 milliGRAM(s) Oral every 6 hours PRN Temp greater or equal to 38C (100.4F), Mild Pain (1 - 3)  aluminum hydroxide/magnesium hydroxide/simethicone Suspension 30 milliLiter(s) Oral every 4 hours PRN Dyspepsia  LORazepam   Injectable 2 milliGRAM(s) IV Push every 1 hour PRN CIWA-Ar score 8 or greater  melatonin 3 milliGRAM(s) Oral at bedtime PRN Insomnia  ondansetron Injectable 4 milliGRAM(s) IV Push every 8 hours PRN Nausea and/or Vomiting      Allergies    No Known Allergies    Intolerances        LABS:                        11.0   9.17  )-----------( 135      ( 06 Nov 2022 05:30 )             33.6     11-07    135  |  100  |  14  ----------------------------<  118<H>  4.7   |  25  |  0.79    Ca    9.4      07 Nov 2022 07:18  Phos  3.3     11-07  Mg     1.3     11-07    TPro  6.2  /  Alb  3.1<L>  /  TBili  <0.2  /  DBili  x   /  AST  31  /  ALT  18  /  AlkPhos  67  11-07          RADIOLOGY & ADDITIONAL TESTS:  Reviewed

## 2022-11-07 NOTE — PROGRESS NOTE ADULT - SUBJECTIVE AND OBJECTIVE BOX
NEUROLOGY CONSULT PROGRESS NOTE    INTERVAL HISTORY:      REVIEW OF SYSTEMS:  As per HPI, otherwise negative for Constitutional, Eyes, Ears/Nose/Mouth/Throat, Neck, Cardiovascular, Respiratory, Gastrointestinal, Genitourinary, Skin, Endocrine, Musculoskeletal, Psychiatric, and Hematologic/Lymphatic.    MEDICATIONS:  acetaminophen     Tablet .. 650 milliGRAM(s) Oral every 6 hours PRN  aluminum hydroxide/magnesium hydroxide/simethicone Suspension 30 milliLiter(s) Oral every 4 hours PRN  enoxaparin Injectable 40 milliGRAM(s) SubCutaneous every 24 hours  folic acid 1 milliGRAM(s) Oral daily  melatonin 3 milliGRAM(s) Oral at bedtime PRN  ondansetron Injectable 4 milliGRAM(s) IV Push every 8 hours PRN  thiamine IVPB 500 milliGRAM(s) IV Intermittent every 8 hours    VITAL SIGNS:  Vital Signs Last 24 Hrs  T(C): 36.8 (07 Nov 2022 12:15), Max: 36.8 (07 Nov 2022 05:53)  T(F): 98.2 (07 Nov 2022 12:15), Max: 98.3 (07 Nov 2022 05:53)  HR: 81 (07 Nov 2022 12:15) (81 - 87)  BP: 128/81 (07 Nov 2022 12:15) (126/75 - 138/85)  BP(mean): --  RR: 18 (07 Nov 2022 12:15) (18 - 18)  SpO2: 99% (07 Nov 2022 12:15) (98% - 99%)    Parameters below as of 07 Nov 2022 12:15  Patient On (Oxygen Delivery Method): room air    PHYSICAL EXAMINATION:  Constitutional: WDWN; NAD  Eyes: anicteric sclera  Cardiovascular: +S1/S2, RRR; no M/R/G  Neurological Examination:  General:  Appearance is consistent with chronologic age.  No abnormal facies.  Gross skin survey within normal limits.    Cognitive/Language:  Awake, alert, and oriented to person, but not place, time, or date.  Nondysarthric.    Cranial Nerves  - Eyes: Visual acuity intact, Visual fields full.  B/l upbeating nystagmus with upgaze.  - Face:  Facial sensation normal V1 - 3, no facial asymmetry.    - Ears/Nose/Throat:  Hearing grossly intact   Motor examination:  Upper Extremities: L 5/5, R 5/5; Lower extremities: L 5/5, R 5/5.  No observable drift. Normal tone and bulk. No tenderness, twitching, tremors or involuntary movements.  Sensory examination:   Intact to light touch throughout  Reflexes:   2+ b/l biceps, brachioradialis, patella  Cerebellum:  mild B/l UE dysmetria and dysdiadokokinesia (L>R).  Gait very wide-based, unable to take more than one step    LABS:                          10.8   7.42  )-----------( 149      ( 07 Nov 2022 12:05 )             32.8     11-07    135  |  100  |  14  ----------------------------<  118<H>  4.7   |  25  |  0.79    Ca    9.4      07 Nov 2022 07:18  Phos  3.3     11-07  Mg     1.3     11-07    TPro  6.2  /  Alb  3.1<L>  /  TBili  <0.2  /  DBili  x   /  AST  31  /  ALT  18  /  AlkPhos  67  11-07 NEUROLOGY CONSULT PROGRESS NOTE    INTERVAL HISTORY: Patient feeling much better. She contemplates not drinking alcohol anymore. We have a discussion about lifestyle and how this substance use is impacting her life and her future. Looks like patient is convinced of moving and treat her addiction.     REVIEW OF SYSTEMS:  As per HPI, otherwise negative for Constitutional, Eyes, Ears/Nose/Mouth/Throat, Neck, Cardiovascular, Respiratory, Gastrointestinal, Genitourinary, Skin, Endocrine, Musculoskeletal, Psychiatric, and Hematologic/Lymphatic.    MEDICATIONS:  acetaminophen     Tablet .. 650 milliGRAM(s) Oral every 6 hours PRN  aluminum hydroxide/magnesium hydroxide/simethicone Suspension 30 milliLiter(s) Oral every 4 hours PRN  enoxaparin Injectable 40 milliGRAM(s) SubCutaneous every 24 hours  folic acid 1 milliGRAM(s) Oral daily  melatonin 3 milliGRAM(s) Oral at bedtime PRN  ondansetron Injectable 4 milliGRAM(s) IV Push every 8 hours PRN  thiamine IVPB 500 milliGRAM(s) IV Intermittent every 8 hours    VITAL SIGNS:  Vital Signs Last 24 Hrs  T(C): 36.8 (07 Nov 2022 12:15), Max: 36.8 (07 Nov 2022 05:53)  T(F): 98.2 (07 Nov 2022 12:15), Max: 98.3 (07 Nov 2022 05:53)  HR: 81 (07 Nov 2022 12:15) (81 - 87)  BP: 128/81 (07 Nov 2022 12:15) (126/75 - 138/85)  BP(mean): --  RR: 18 (07 Nov 2022 12:15) (18 - 18)  SpO2: 99% (07 Nov 2022 12:15) (98% - 99%)    Parameters below as of 07 Nov 2022 12:15  Patient On (Oxygen Delivery Method): room air    PHYSICAL EXAMINATION:  Constitutional: WDWN; NAD  Eyes: anicteric sclera  Cardiovascular: +S1/S2, RRR; no M/R/G  Neurological Examination:  General:  Appearance is consistent with chronologic age.  No abnormal facies.  Gross skin survey within normal limits.    Cognitive/Language:  Awake, alert, and oriented to person, but not place, time, or date.  Nondysarthric. Language looks normal.   Cranial Nerves  - Eyes: Visual acuity intact, Visual fields full.  B/l upbeating nystagmus with upgaze and rightgaze.  - Face:  Facial sensation normal V1 - 3, no facial asymmetry.    - Ears/Nose/Throat:  Hearing grossly intact   Motor examination:  Upper Extremities: L 5/5, R 5/5; Lower extremities: L 5/5, R 5/5.  No observable drift. Normal tone and bulk. No tenderness, twitching, tremors or involuntary movements.  Sensory examination:   Intact to light touch and temperature throughout. Vibration diminished but present.   Reflexes:   2+ b/l biceps, brachioradialis, patella, 1+ achilles   Cerebellum:  mild B/l UE dysmetria and dysdiadochokinesia (L>R).  Gait very wide-based, unable to take more than one step    LABS:                          10.8   7.42  )-----------( 149      ( 07 Nov 2022 12:05 )             32.8     11-07    135  |  100  |  14  ----------------------------<  118<H>  4.7   |  25  |  0.79    Ca    9.4      07 Nov 2022 07:18  Phos  3.3     11-07  Mg     1.3     11-07    TPro  6.2  /  Alb  3.1<L>  /  TBili  <0.2  /  DBili  x   /  AST  31  /  ALT  18  /  AlkPhos  67  11-07 NEUROLOGY CONSULT PROGRESS NOTE    INTERVAL HISTORY: Patient feeling much better. She contemplates not drinking alcohol anymore. We have a discussion about lifestyle and how this substance use is impacting her life and her future.    REVIEW OF SYSTEMS:  As per HPI, otherwise negative for Constitutional, Eyes, Ears/Nose/Mouth/Throat, Neck, Cardiovascular, Respiratory, Gastrointestinal, Genitourinary, Skin, Endocrine, Musculoskeletal, Psychiatric, and Hematologic/Lymphatic.    MEDICATIONS:  acetaminophen     Tablet .. 650 milliGRAM(s) Oral every 6 hours PRN  aluminum hydroxide/magnesium hydroxide/simethicone Suspension 30 milliLiter(s) Oral every 4 hours PRN  enoxaparin Injectable 40 milliGRAM(s) SubCutaneous every 24 hours  folic acid 1 milliGRAM(s) Oral daily  melatonin 3 milliGRAM(s) Oral at bedtime PRN  ondansetron Injectable 4 milliGRAM(s) IV Push every 8 hours PRN  thiamine IVPB 500 milliGRAM(s) IV Intermittent every 8 hours    VITAL SIGNS:  Vital Signs Last 24 Hrs  T(C): 36.8 (07 Nov 2022 12:15), Max: 36.8 (07 Nov 2022 05:53)  T(F): 98.2 (07 Nov 2022 12:15), Max: 98.3 (07 Nov 2022 05:53)  HR: 81 (07 Nov 2022 12:15) (81 - 87)  BP: 128/81 (07 Nov 2022 12:15) (126/75 - 138/85)  BP(mean): --  RR: 18 (07 Nov 2022 12:15) (18 - 18)  SpO2: 99% (07 Nov 2022 12:15) (98% - 99%)    Parameters below as of 07 Nov 2022 12:15  Patient On (Oxygen Delivery Method): room air    PHYSICAL EXAMINATION:  Constitutional: WDWN; NAD  Eyes: anicteric sclera  Cardiovascular: +S1/S2, RRR; no M/R/G  Neurological Examination:  General:  Appearance is consistent with chronologic age.  No abnormal facies.  Gross skin survey within normal limits.    Cognitive/Language:  Awake, alert, and oriented to person, but not place, time, or date.  Nondysarthric. Language looks normal.   Cranial Nerves  - Eyes: Visual acuity intact, Visual fields full.  B/l upbeating nystagmus with upgaze and rightgaze.  - Face:  Facial sensation normal V1 - 3, no facial asymmetry.    - Ears/Nose/Throat:  Hearing grossly intact   Motor examination:  Upper Extremities: L 5/5, R 5/5; Lower extremities: L 5/5, R 5/5.  No observable drift. Normal tone and bulk. No tenderness, twitching, tremors or involuntary movements.  Sensory examination:   Intact to light touch and temperature throughout. Vibration diminished but present.   Reflexes:   2+ b/l biceps, brachioradialis, patella, 1+ achilles   Cerebellum:  mild B/l UE dysmetria and dysdiadochokinesia (L>R).  Gait very wide-based, unable to take more than one step    LABS:                          10.8   7.42  )-----------( 149      ( 07 Nov 2022 12:05 )             32.8     11-07    135  |  100  |  14  ----------------------------<  118<H>  4.7   |  25  |  0.79    Ca    9.4      07 Nov 2022 07:18  Phos  3.3     11-07  Mg     1.3     11-07    TPro  6.2  /  Alb  3.1<L>  /  TBili  <0.2  /  DBili  x   /  AST  31  /  ALT  18  /  AlkPhos  67  11-07

## 2022-11-07 NOTE — PROGRESS NOTE ADULT - ASSESSMENT
62F with PMH of ETOH abuse (approx 6-8 beers daily, last drink 24 hours ago) brought in by friends for AMS and difficulty walking, falls. Currently admitted under medicine for AMS work up. Neurology consulted for recommendations.     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     Recommendations  - Continue with high dose thiamine IV  - f/u MRI head w/wo cont  - B12/Folate, TSH - wnl  - RPR - neg  - Continue CIWA protocol  - Fall precautions  - PT/OT  - Continue rest of care as per primary team    Plan discussed with Attending Dr. Alise Witt MD  Neurology, PGY-2 62F with PMH of ETOH abuse (approx 6-8 beers daily, last drink 24 hours ago) brought in by friends for AMS and difficulty walking, falls. Currently admitted under medicine for AMS work up. Neurology consulted for recommendations.     Impression  - Given history of alcohol use and exam findings, etiology of symptoms most consistent with chronic symmetric sensory polyneuropathy +/- Wernicke encephalopathy +/- chronic cerebellar ataxia 2/2 alcohol abuse. B12, folate, TSH, RPR has been normal.    Recommendations  - Continue with high dose thiamine IV  - f/u MRI head w/wo cont  - Continue CIWA protocol  - Fall precautions  - PT/OT  - Continue rest of care as per primary team    Plan discussed with Attending Dr. Alise Witt MD  Neurology, PGY-2

## 2022-11-07 NOTE — PROGRESS NOTE ADULT - PROBLEM SELECTOR PLAN 3
Pt reports dysuria. UA positive. Treat for symptomatic UTI    - CTX x 3days, 1st dose (11/4) Pt reports dysuria. UA positive. Treat for symptomatic UTI    Completed abx course. No further intervention.    - s/p CTX x 3days

## 2022-11-07 NOTE — PROGRESS NOTE ADULT - PROBLEM SELECTOR PLAN 2
Upon admission Alcohol level <10.  CIWA score 0 at the time of assessment by ICU team, s/p Ativan 1mg for agitation. For the last days friend has probably provided 1-2 beers per day to avoid withdrawal. Patient consumes 6 beers per day at baseline. Friend unsure if patient has history of seizures, intubation, previous alcohol withdrawals nor esophageal bleeds.     CIWA 0 this AM    -ativan 2 mg IVP q2h/PRN   -fall precautions    -Thiamine 500 mg IV x 5 day  -Folic acid 100 mg PO, MV Upon admission Alcohol level <10.  CIWA score 0 at the time of assessment by ICU team, s/p Ativan 1mg for agitation. For the last days friend has probably provided 1-2 beers per day to avoid withdrawal. Patient consumes 6 beers per day at baseline. Friend unsure if patient has history of seizures, intubation, previous alcohol withdrawals nor esophageal bleeds.     CIWA 0 this AM    Discontinue CIWA protocol    -ativan 2 mg IVP q2h/PRN   -fall precautions    -Thiamine 500 mg IV x 5 day  -Folic acid 100 mg PO, MV

## 2022-11-07 NOTE — PROGRESS NOTE ADULT - PROBLEM SELECTOR PLAN 6
Patient tachy to 108, likely in the setting of pain. No acute alcohol withdrawal. Tachycardia has resolved. EKG NSR    -cont to monitor  -address pain prn    #Diffuse T wave inversion   Trop negative x1, unknown cardiovascular history. As per friend not taking any medications.  -repeat EKG in the morning, unchanged

## 2022-11-07 NOTE — PROGRESS NOTE ADULT - PROBLEM SELECTOR PLAN 1
Metabolic vs Toxic   Pt with cerebellar deficits on physical exam. Hep C, HSV, VDRL all negative. CT Head reviewed, showed diffuse cerebral volume loss and extensive white matter changes. CTA H/N reviewed, showed no LVO or stenosis. Pt w/ history of MDD per friend. Ddx includes Wernicke-Korsakoff encephalopathy in the setting of chronic EtOH use. B12 and folate wnl on blood work. Pending MRI. Symptoms unlikely to be due to UTI, however will complete antibiotic course.    - Treat UTI with CTX x3 days (last day 11/6)  - Thiamine 500 mg IV x 5 day  - Follow up neurology recs  - MRI pending

## 2022-11-08 LAB
ALBUMIN SERPL ELPH-MCNC: 3.5 G/DL — SIGNIFICANT CHANGE UP (ref 3.3–5)
ALP SERPL-CCNC: 91 U/L — SIGNIFICANT CHANGE UP (ref 40–120)
ALT FLD-CCNC: 27 U/L — SIGNIFICANT CHANGE UP (ref 10–45)
ANION GAP SERPL CALC-SCNC: 10 MMOL/L — SIGNIFICANT CHANGE UP (ref 5–17)
AST SERPL-CCNC: 37 U/L — SIGNIFICANT CHANGE UP (ref 10–40)
BILIRUB SERPL-MCNC: <0.2 MG/DL — SIGNIFICANT CHANGE UP (ref 0.2–1.2)
BUN SERPL-MCNC: 15 MG/DL — SIGNIFICANT CHANGE UP (ref 7–23)
CALCIUM SERPL-MCNC: 9.8 MG/DL — SIGNIFICANT CHANGE UP (ref 8.4–10.5)
CHLORIDE SERPL-SCNC: 101 MMOL/L — SIGNIFICANT CHANGE UP (ref 96–108)
CO2 SERPL-SCNC: 26 MMOL/L — SIGNIFICANT CHANGE UP (ref 22–31)
CREAT SERPL-MCNC: 0.83 MG/DL — SIGNIFICANT CHANGE UP (ref 0.5–1.3)
EGFR: 79 ML/MIN/1.73M2 — SIGNIFICANT CHANGE UP
GLUCOSE SERPL-MCNC: 115 MG/DL — HIGH (ref 70–99)
HCT VFR BLD CALC: 38.1 % — SIGNIFICANT CHANGE UP (ref 34.5–45)
HGB BLD-MCNC: 12.6 G/DL — SIGNIFICANT CHANGE UP (ref 11.5–15.5)
MAGNESIUM SERPL-MCNC: 1.9 MG/DL — SIGNIFICANT CHANGE UP (ref 1.6–2.6)
MCHC RBC-ENTMCNC: 33.1 GM/DL — SIGNIFICANT CHANGE UP (ref 32–36)
MCHC RBC-ENTMCNC: 34.6 PG — HIGH (ref 27–34)
MCV RBC AUTO: 104.7 FL — HIGH (ref 80–100)
NRBC # BLD: 0 /100 WBCS — SIGNIFICANT CHANGE UP (ref 0–0)
PHOSPHATE SERPL-MCNC: 4.4 MG/DL — SIGNIFICANT CHANGE UP (ref 2.5–4.5)
PLATELET # BLD AUTO: 165 K/UL — SIGNIFICANT CHANGE UP (ref 150–400)
POTASSIUM SERPL-MCNC: 5 MMOL/L — SIGNIFICANT CHANGE UP (ref 3.5–5.3)
POTASSIUM SERPL-SCNC: 5 MMOL/L — SIGNIFICANT CHANGE UP (ref 3.5–5.3)
PROT SERPL-MCNC: 7 G/DL — SIGNIFICANT CHANGE UP (ref 6–8.3)
RBC # BLD: 3.64 M/UL — LOW (ref 3.8–5.2)
RBC # FLD: 13 % — SIGNIFICANT CHANGE UP (ref 10.3–14.5)
SODIUM SERPL-SCNC: 137 MMOL/L — SIGNIFICANT CHANGE UP (ref 135–145)
WBC # BLD: 7.97 K/UL — SIGNIFICANT CHANGE UP (ref 3.8–10.5)
WBC # FLD AUTO: 7.97 K/UL — SIGNIFICANT CHANGE UP (ref 3.8–10.5)

## 2022-11-08 PROCEDURE — 99233 SBSQ HOSP IP/OBS HIGH 50: CPT

## 2022-11-08 RX ADMIN — MAGNESIUM OXIDE 400 MG ORAL TABLET 400 MILLIGRAM(S): 241.3 TABLET ORAL at 18:18

## 2022-11-08 RX ADMIN — ENOXAPARIN SODIUM 40 MILLIGRAM(S): 100 INJECTION SUBCUTANEOUS at 05:41

## 2022-11-08 RX ADMIN — Medication 1 MILLIGRAM(S): at 12:02

## 2022-11-08 NOTE — PROGRESS NOTE ADULT - ASSESSMENT
62F with PMH of ETOH abuse (approx 6-8 beers daily, last drink 24 hours ago) brought in by friends for AMS and difficulty walking, falls. Currently admitted under medicine for AMS work up. Neurology consulted for recommendations. MRI head w/wo cont shows findings compatible with Wernicke's syndrome with periacueductual, mamilar and pulvinar findings, and chronic cerebellar atrophy.     Impression  - Given history of alcohol use and exam findings, radiology imaging and etiology of symptoms most consistent with chronic symmetric sensory polyneuropathy +/- Wernicke encephalopathy +/- chronic cerebellar ataxia 2/2 alcohol abuse. B12, folate, TSH, RPR has been normal.     Recommendations  - Continue with high dose thiamine IV, after 7 days can transition to oral   - Continue CIWA protocol  - Fall precautions  - PT/OT  - Continue rest of care as per primary team    Plan discussed with Attending Dr. Carrero. Note not complete until final attending attestation.   Jarad Witt MD  Neurology, PGY-2 62F with PMH of ETOH abuse (approx 6-8 beers daily, last drink 24 hours ago) brought in by friends for AMS and difficulty walking, falls. Currently admitted under medicine for AMS work up. Neurology consulted for recommendations. MRI head w/wo cont shows findings compatible with Wernicke's encephalopathy with periacueductual, mamillary and pulvinar findings, and chronic cerebellar atrophy.     Impression  - Given history of alcohol use and exam findings, radiology imaging and etiology of symptoms most consistent with chronic symmetric sensory polyneuropathy +/- Wernicke encephalopathy +/- chronic cerebellar ataxia 2/2 alcohol abuse. B12, folate, TSH, RPR has been normal.     Recommendations  - Continue with high dose thiamine IV, after 7 days can transition to oral   - Continue CIWA protocol  - Fall precautions  - PT/OT  - Continue rest of care as per primary team    Plan discussed with Attending Dr. Carrero. Note not complete until final attending attestation.   Jarad Witt MD  Neurology, PGY-2

## 2022-11-08 NOTE — PROGRESS NOTE ADULT - SUBJECTIVE AND OBJECTIVE BOX
INTERVAL HPI/OVERNIGHT EVENTS:  Patient was seen and examined at bedside. As per patient, no o/n events, patient resting comfortably. No complaints at this time. Patient is less confused than yesterday, although still believes she is in the [place]. States that she had a conversation with a provider yesterday regarding her alcohol consumption and wishes to stop drinking.  Patient denies: fever, chills, lightheadedness CP, palpitations, SOB, cough, N/V. ROS otherwise negative.    VITAL SIGNS:  T(F): 97.5 (11-08-22 @ 05:44)  HR: 76 (11-08-22 @ 05:44)  BP: 118/73 (11-08-22 @ 05:44)  RR: 18 (11-08-22 @ 05:44)  SpO2: 98% (11-08-22 @ 05:44)  Wt(kg): --        PHYSICAL EXAM:    Constitutional: thin elderly female resting comfortably in bed; NAD  HEENT: NC/AT, PER, anicteric sclera, no nasal discharge; dry MM  Neck: supple; no JVD or thyromegaly, no lymphadenopathy  Respiratory: CTA B/L; no W/R/R, no retractions  Cardiac: +S1/S2; RRR; no M/R/G  Gastrointestinal: soft, NT/ND; no rebound or guarding  Back: spine midline, no bony tenderness, stage 3 sacral ulcer  Extremities: WWP, no clubbing or cyanosis; no peripheral edema  Musculoskeletal: NROM x4; no joint swelling, tenderness or erythema. Normal  strength. 3/5 muscle strength b/l LE  Vascular: 2+ radial, DP/PT pulses B/L  Neurologic: AAOx2; Can move all extremities. No observed focal deficits. Sensation intact throughout. Dysmetria on cerebellar exam. No vertical nystagmus observed this AM  Psychiatric: affect and characteristics of appearance, verbalizations, behaviors are appropriate.    MEDICATIONS  (STANDING):  enoxaparin Injectable 40 milliGRAM(s) SubCutaneous every 24 hours  folic acid 1 milliGRAM(s) Oral daily  magnesium oxide 400 milliGRAM(s) Oral every 24 hours    MEDICATIONS  (PRN):  acetaminophen     Tablet .. 650 milliGRAM(s) Oral every 6 hours PRN Temp greater or equal to 38C (100.4F), Mild Pain (1 - 3)  aluminum hydroxide/magnesium hydroxide/simethicone Suspension 30 milliLiter(s) Oral every 4 hours PRN Dyspepsia  melatonin 3 milliGRAM(s) Oral at bedtime PRN Insomnia  ondansetron Injectable 4 milliGRAM(s) IV Push every 8 hours PRN Nausea and/or Vomiting      Allergies    No Known Allergies    Intolerances        LABS:                        12.6   7.97  )-----------( 165      ( 08 Nov 2022 06:46 )             38.1     11-07    135  |  100  |  14  ----------------------------<  118<H>  4.7   |  25  |  0.79    Ca    9.4      07 Nov 2022 07:18  Phos  3.3     11-07  Mg     1.3     11-07    TPro  6.2  /  Alb  3.1<L>  /  TBili  <0.2  /  DBili  x   /  AST  31  /  ALT  18  /  AlkPhos  67  11-07          RADIOLOGY & ADDITIONAL TESTS:  Reviewed INTERVAL HPI/OVERNIGHT EVENTS:  Patient was seen and examined at bedside. As per patient, no o/n events, patient resting comfortably. No complaints at this time. Patient continues to be confused. MRI completed yesterday w/o acute pathology. Patient denies: fever, chills, lightheadedness CP, palpitations, SOB, cough, N/V. ROS otherwise negative.    VITAL SIGNS:  T(F): 97.5 (11-08-22 @ 05:44)  HR: 76 (11-08-22 @ 05:44)  BP: 118/73 (11-08-22 @ 05:44)  RR: 18 (11-08-22 @ 05:44)  SpO2: 98% (11-08-22 @ 05:44)  Wt(kg): --        PHYSICAL EXAM:    Constitutional: thin elderly female resting comfortably in bed; NAD  HEENT: NC/AT, PER, anicteric sclera, no nasal discharge; dry MM  Neck: supple; no JVD or thyromegaly, no lymphadenopathy  Respiratory: CTA B/L; no W/R/R, no retractions  Cardiac: +S1/S2; RRR; no M/R/G  Gastrointestinal: soft, NT/ND; no rebound or guarding  Back: spine midline, no bony tenderness, stage 3 sacral ulcer  Extremities: WWP, no clubbing or cyanosis; no peripheral edema  Musculoskeletal: NROM x4; no joint swelling, tenderness or erythema. Normal  strength. 3/5 muscle strength b/l LE  Vascular: 2+ radial, DP/PT pulses B/L  Neurologic: AAOx2; Can move all extremities. No observed focal deficits. Sensation intact throughout. Dysmetria on cerebellar exam. No vertical nystagmus observed this AM  Psychiatric: affect and characteristics of appearance, verbalizations, behaviors are appropriate.    MEDICATIONS  (STANDING):  enoxaparin Injectable 40 milliGRAM(s) SubCutaneous every 24 hours  folic acid 1 milliGRAM(s) Oral daily  magnesium oxide 400 milliGRAM(s) Oral every 24 hours    MEDICATIONS  (PRN):  acetaminophen     Tablet .. 650 milliGRAM(s) Oral every 6 hours PRN Temp greater or equal to 38C (100.4F), Mild Pain (1 - 3)  aluminum hydroxide/magnesium hydroxide/simethicone Suspension 30 milliLiter(s) Oral every 4 hours PRN Dyspepsia  melatonin 3 milliGRAM(s) Oral at bedtime PRN Insomnia  ondansetron Injectable 4 milliGRAM(s) IV Push every 8 hours PRN Nausea and/or Vomiting      Allergies    No Known Allergies    Intolerances        LABS:                        12.6   7.97  )-----------( 165      ( 08 Nov 2022 06:46 )             38.1     11-07    135  |  100  |  14  ----------------------------<  118<H>  4.7   |  25  |  0.79    Ca    9.4      07 Nov 2022 07:18  Phos  3.3     11-07  Mg     1.3     11-07    TPro  6.2  /  Alb  3.1<L>  /  TBili  <0.2  /  DBili  x   /  AST  31  /  ALT  18  /  AlkPhos  67  11-07          RADIOLOGY & ADDITIONAL TESTS:  Reviewed

## 2022-11-08 NOTE — CONSULT NOTE ADULT - SUBJECTIVE AND OBJECTIVE BOX
Consult requested by: Salvador Jones MD 		Role: Resident   		  Service: Medicine  Attending: Paulette Rockwell MD  Consultant: Bernice Rodarte MS, [facility]- (Medical Ethicist)                             Contact #s: [phone] (c)  [phone] (o)  Consult purpose:  To assist the healthcare team in an ethical dilemma of a 62-year-old female admitted for altered mental status, currently with fluctuating capacity, regarding appropriate surrogate decision maker.    Clinical summary: This is a 62-year-old female with a past medical history of alcohol abuse (6 beers daily) and major depressive disorder who was brought in by friend for altered mental status, difficulty ambulating, and inability to care for herself status post recent falls. Per friend, patient has been progressively altered, not eating or taking care of herself. In the ED, patient was alert and oriented x2, no tongue fasciculations, or tremors. Patient was unsteady on feet and unable to ambulate. ICU was consulted for encephalopathy and inability to ambulate secondary to lower extremity weakness. Patient found to have urinary tract infection. CT head on 11/3 showed diffuse cerebral volume loss and extensive white matter changes. CT angiogram head/neck on 11/3 showed no LVO or stenosis. CT C-spine on 11/3 showed multilevel degenerative disease without acute fractures. Neurology on consult. Per Neurology on 11/3, patient likely with failure to thrive in setting of underlying major depressive disorder vs vascular dementia vs toxic metabolic encephalopathy. CT lumbar and thoracic spine on 11/3 showed multilevel degenerative changes of the spine with moderate spinal canal stenosis L2-3 and L3-4 and moderate to severe spinal canal stenosis at L4-5. L5-S1 disc herniation contacting the descending left S1 nerve root. Patient also found to have Stage 2 decubitus ulcer. Per Neurology on 11/4, given chronic alcohol use, patient’s etiology of symptoms most consistent with Wernicke’s encephalopathy, peripheral neuropathy. On 11/6, patient’s Neurology exam notable for Nystagmus with bilateral up gaze. On 11/7, per Neurology, MRI head without contrast shows findings compatible with Wenicke’s syndrome with periacueductual, mamillar and pulvinar findings, and chronic cerebellar atrophy. Throughout hospital course, patient’s capacity has waxed and waned. Ethics consult initiated regarding appropriate surrogate decision maker given patient’s fluctuating capacity.        Prognosis Estimate (survival in days, wks, mos, yrs):	 Guarded		  Patient Decision-Making Capacity:   Has capacity	 	  Lacks capacity		  Capacity waxes and wanes  Patient Aware of:  Diagnosis:   Yes    No   Unknown   		 Prognosis:   Yes    No   Unknown       Name of medical decision-maker should patient lack capacity: Alexa Pineda		Relationship: Daughter  Role:    Health Care Proxy       Legal Surrogate   Contact #(s): [phone]  Other Stake-Holders: Theo Montes, Friend [phone]; other children; brother  Evidence of Patient’s Preference of Life-Sustaining Treatment (Written or Oral): None				                 Resuscitation status:  DNR:  Yes   No      DNI:  Yes   No        Discussions:   Discussion with Salvador Jones MD (Medicine) on 11/3/22 (10:50 – 11:00): Reviewed patient’s medical record and social history. Patient admitted for altered mental status. Patient has a HCP form signed on 11/4, where she named her daughter. While patient currently lacks capacity (as deemed by medical team), her mental status waxes and wanes. On 11/4, it was noted that patient was able to make decisions regarding HCP. Patient now stating she would like her brother to be her surrogate decision maker. Ethics informed that if patient lacks capacity to name a HCP, then the HCP cannot be changed to her brother and patient’s daughter, current HCP, remains the surrogate.  Discussion with Chely Farley (Patient) on 11/3/22 (11:30 – 11:50): Met with patient at bedside. Patient very amicable but not oriented. She stated she came into the hospital because her knee is swollen and when she was at work, she was advised to come in (patient was in fact brought into hospital by her friend). Patient says she has been in the hospital for 2 hours and has not yet been seen by any doctor. She also stated that she was waiting to go over the bridge to the other building to survey the premises and then to the stadium nearby. Patient works as a  for the park and has 3 children (1 daughter and 2 boys)—daughter is the eldest and lives in [place].   Discussion with Salvador Jones MD (Medicine) on 11/3/22 (12:00 – 12:05): Discussed visitation with patient and reaffirmed that per Ethics’ assessment, patient lacks capacity at this time to change HCP. If patient regains capacity, HCP can be changed.    Bioethics analysis:  The central ethical issue that exists in this case is (A) respect for the patient’s autonomy. The principle of respect for autonomous persons acknowledges a patient’s right to hold views, make choices and take actions based on his/her values and beliefs.1 Furthermore, this principle acknowledges the patient’s dignity and bodily integrity.1 Essential to this principle is the capacity for autonomous choice.1 In other words, the patient’s capacity to decide what can and cannot be done to her according to her set of values.     Capacity is the ability to comprehend, understand, appreciate and reason (C-U-A-R). Crucial to capacity is the ability to communicate an understanding of the option(s) at hand and an appreciation of the risks and benefits of pursing that option versus not. It is important to note that capacity is decision-specific and can thus wax and wane over time. In that regard, capacity is an ongoing process, not a single event or assessment, and it involves a willingness on the part of the practitioners to reassess capacity over time. Adults are presumed to have the capacity to made decisions, except when it can be demonstrated that they lack capacity.2 At this time, Ms. Farley has shown the inability to make medical decisions, including naming a surrogate decision maker, given her lack of orientation to her surroundings and condition.     When a patient does not have the ability to make decisions for herself, we must protect her autonomy by finding an appropriate surrogate decision maker. In this case, the patient has safeguarded her autonomy by previously choosing a Health Care Proxy (HCP).     Under [facility] Health Care Proxy Law,3 a competent adult may appoint a health care agent by a health care proxy, signed and dated by the adult in the presence of two adult witnesses who shall also sign the proxy. Another person may sign and date the health care proxy for the adult if the adult is unable to do so, at the adult's direction and in the adult's presence, and in the presence of two adult witnesses who shall sign the proxy.3 The witnesses shall state that the principal appeared to execute the proxy willingly and free from duress.3 The person appointed as agent shall not act as witness to execution of the health care proxy.3 The agent's authority shall commence upon a determination that the principal lacks capacity to make health care decisions.3    Individuals should choose a person they trust to protect their wishes and interests.4 Each person can appoint only one agent, but individuals may name an alternate agent.4 The alternate serves in place of the agent if one of three circumstances occurs: 1) the attending physician determines that the agent is not reasonably available, willing and competent to serve, and is not expected to become so in a manner that will permit him or her to make a timely decision given the patient’s medical circumstances; 2) a court disqualifies the agent from serving; 3) conditions described by the patient in the proxy occur.4 If the person appointed as the original agent becomes available to serve after an alternate agent has begun to make health care decisions, the alternate’s authority ends, and the original agent’s authority begins.4    A proxy remains effective indefinitely unless the proxy document includes an expiration date or a description of circumstances that trigger expiration.4 Even if a proxy contains an expiration date, if the patient loses decision-making capacity before that date, the agent continues to have decision-making authority for as long as the patient lacks capacity.4 An adult with capacity can revoke a health care proxy by any words or actions showing an intention to revoke, or by creating a new proxy.4  Consult requested by: Salvador Jones MD 		Role: Resident   		  Service: Medicine  Attending: Paulette Rockwell MD  Consultant: Bernice Rodarte MS, [facility]- (Medical Ethicist)                             Contact #s: [phone] (c)  [phone] (o)  Consult purpose:  To assist the healthcare team in an ethical dilemma of a 62-year-old female admitted for altered mental status, currently with fluctuating capacity, regarding appropriate surrogate decision maker.    Clinical summary: This is a 62-year-old female with a past medical history of alcohol abuse (6 beers daily) and major depressive disorder who was brought in by friend for altered mental status, difficulty ambulating, and inability to care for herself status post recent falls. Per friend, patient has been progressively altered, not eating or taking care of herself. In the ED, patient was alert and oriented x2, no tongue fasciculations, or tremors. Patient was unsteady on feet and unable to ambulate. ICU was consulted for encephalopathy and inability to ambulate secondary to lower extremity weakness. Patient found to have urinary tract infection. CT head on 11/3 showed diffuse cerebral volume loss and extensive white matter changes. CT angiogram head/neck on 11/3 showed no LVO or stenosis. CT C-spine on 11/3 showed multilevel degenerative disease without acute fractures. Neurology on consult. Per Neurology on 11/3, patient likely with failure to thrive in setting of underlying major depressive disorder vs vascular dementia vs toxic metabolic encephalopathy. CT lumbar and thoracic spine on 11/3 showed multilevel degenerative changes of the spine with moderate spinal canal stenosis L2-3 and L3-4 and moderate to severe spinal canal stenosis at L4-5. L5-S1 disc herniation contacting the descending left S1 nerve root. Patient also found to have Stage 2 decubitus ulcer. Per Neurology on 11/4, given chronic alcohol use, patient’s etiology of symptoms most consistent with Wernicke’s encephalopathy, peripheral neuropathy. On 11/6, patient’s Neurology exam notable for Nystagmus with bilateral up gaze. On 11/8, per Neurology, MRI head without contrast shows findings compatible with Wenicke’s syndrome with periacueductual, mamillar and pulvinar findings, and chronic cerebellar atrophy. Throughout hospital course, patient’s capacity has waxed and waned. Ethics consult initiated regarding appropriate surrogate decision maker given patient’s fluctuating capacity.        Prognosis Estimate (survival in days, wks, mos, yrs):	 Guarded		  Patient Decision-Making Capacity:   Has capacity	 	  Lacks capacity		  Capacity waxes and wanes  Patient Aware of:  Diagnosis:   Yes    No   Unknown   		 Prognosis:   Yes    No   Unknown       Name of medical decision-maker should patient lack capacity: Alexa Pineda		Relationship: Daughter  Role:    Health Care Proxy       Legal Surrogate   Contact #(s): [phone]  Other Stake-Holders: Theo Montes, Friend [phone]; other children; brother  Evidence of Patient’s Preference of Life-Sustaining Treatment (Written or Oral): None				                 Resuscitation status:  DNR:  Yes   No      DNI:  Yes   No        Discussions:   Discussion with Salvador Jones MD (Medicine) on 11/3/22 (10:50 – 11:00): Reviewed patient’s medical record and social history. Patient admitted for altered mental status. Patient has a HCP form signed on 11/4, where she named her daughter. While patient currently lacks capacity (as deemed by medical team), her mental status waxes and wanes. On 11/4, it was noted that patient was able to make decisions regarding HCP. Patient now stating she would like her brother to be her surrogate decision maker. Ethics informed that if patient lacks capacity to name a HCP, then the HCP cannot be changed to her brother and patient’s daughter, current HCP, remains the surrogate.  Discussion with Chely Farley (Patient) on 11/3/22 (11:30 – 11:50): Met with patient at bedside. Patient very amicable but not oriented. She stated she came into the hospital because her knee is swollen and when she was at work, she was advised to come in (patient was in fact brought into hospital by her friend). Patient says she has been in the hospital for 2 hours and has not yet been seen by any doctor. She also stated that she was waiting to go over the bridge to the other building to survey the premises and then to the stadium nearby. Patient works as a  for the park and has 3 children (1 daughter and 2 boys)—daughter is the eldest and lives in [place].   Discussion with Salvador Jones MD (Medicine) on 11/3/22 (12:00 – 12:05): Discussed visitation with patient and reaffirmed that per Ethics’ assessment, patient lacks capacity at this time to change HCP. If patient regains capacity, HCP can be changed.    Bioethics analysis:  The central ethical issue that exists in this case is (A) respect for the patient’s autonomy. The principle of respect for autonomous persons acknowledges a patient’s right to hold views, make choices and take actions based on his/her values and beliefs.1 Furthermore, this principle acknowledges the patient’s dignity and bodily integrity.1 Essential to this principle is the capacity for autonomous choice.1 In other words, the patient’s capacity to decide what can and cannot be done to her according to her set of values.     Capacity is the ability to comprehend, understand, appreciate and reason (C-U-A-R). Crucial to capacity is the ability to communicate an understanding of the option(s) at hand and an appreciation of the risks and benefits of pursing that option versus not. It is important to note that capacity is decision-specific and can thus wax and wane over time. In that regard, capacity is an ongoing process, not a single event or assessment, and it involves a willingness on the part of the practitioners to reassess capacity over time. Adults are presumed to have the capacity to made decisions, except when it can be demonstrated that they lack capacity.2 At this time, Ms. Farley has shown the inability to make medical decisions, including naming a surrogate decision maker, given her lack of orientation to her surroundings and condition.     When a patient does not have the ability to make decisions for herself, we must protect her autonomy by finding an appropriate surrogate decision maker. In this case, the patient has safeguarded her autonomy by previously choosing a Health Care Proxy (HCP).     Under [facility] Health Care Proxy Law,3 a competent adult may appoint a health care agent by a health care proxy, signed and dated by the adult in the presence of two adult witnesses who shall also sign the proxy. Another person may sign and date the health care proxy for the adult if the adult is unable to do so, at the adult's direction and in the adult's presence, and in the presence of two adult witnesses who shall sign the proxy.3 The witnesses shall state that the principal appeared to execute the proxy willingly and free from duress.3 The person appointed as agent shall not act as witness to execution of the health care proxy.3 The agent's authority shall commence upon a determination that the principal lacks capacity to make health care decisions.3    Individuals should choose a person they trust to protect their wishes and interests.4 Each person can appoint only one agent, but individuals may name an alternate agent.4 The alternate serves in place of the agent if one of three circumstances occurs: 1) the attending physician determines that the agent is not reasonably available, willing and competent to serve, and is not expected to become so in a manner that will permit him or her to make a timely decision given the patient’s medical circumstances; 2) a court disqualifies the agent from serving; 3) conditions described by the patient in the proxy occur.4 If the person appointed as the original agent becomes available to serve after an alternate agent has begun to make health care decisions, the alternate’s authority ends, and the original agent’s authority begins.4    A proxy remains effective indefinitely unless the proxy document includes an expiration date or a description of circumstances that trigger expiration.4 Even if a proxy contains an expiration date, if the patient loses decision-making capacity before that date, the agent continues to have decision-making authority for as long as the patient lacks capacity.4 An adult with capacity can revoke a health care proxy by any words or actions showing an intention to revoke, or by creating a new proxy.4  Consult requested by: Salvador Jones MD 		Role: Resident   		  Service: Medicine  Attending: Paulette Rockwell MD  Consultant: Bernice Rodarte MS, [facility]- (Medical Ethicist)                             Contact #s: [phone] (c)  [phone] (o)  Consult purpose:  To assist the healthcare team in an ethical dilemma of a 62-year-old female admitted for altered mental status, currently with fluctuating capacity, regarding appropriate surrogate decision maker.    Clinical summary: This is a 62-year-old female with a past medical history of alcohol abuse (6 beers daily) and major depressive disorder who was brought in by friend for altered mental status, difficulty ambulating, and inability to care for herself status post recent falls. Per friend, patient has been progressively altered, not eating or taking care of herself. In the ED, patient was alert and oriented x2, no tongue fasciculations, or tremors. Patient was unsteady on feet and unable to ambulate. ICU was consulted for encephalopathy and inability to ambulate secondary to lower extremity weakness. Patient found to have urinary tract infection. CT head on 11/3 showed diffuse cerebral volume loss and extensive white matter changes. CT angiogram head/neck on 11/3 showed no LVO or stenosis. CT C-spine on 11/3 showed multilevel degenerative disease without acute fractures. Neurology on consult. Per Neurology on 11/3, patient likely with failure to thrive in setting of underlying major depressive disorder vs vascular dementia vs toxic metabolic encephalopathy. CT lumbar and thoracic spine on 11/3 showed multilevel degenerative changes of the spine with moderate spinal canal stenosis L2-3 and L3-4 and moderate to severe spinal canal stenosis at L4-5. L5-S1 disc herniation contacting the descending left S1 nerve root. Patient also found to have Stage 2 decubitus ulcer. Per Neurology on 11/4, given chronic alcohol use, patient’s etiology of symptoms most consistent with Wernicke’s encephalopathy, peripheral neuropathy. On 11/6, patient’s Neurology exam notable for Nystagmus with bilateral up gaze. On 11/8, per Neurology, MRI head without contrast shows findings compatible with Wenicke’s syndrome with periacueductual, mamillar and pulvinar findings, and chronic cerebellar atrophy. Throughout hospital course, patient’s capacity has waxed and waned. Ethics consult initiated regarding appropriate surrogate decision maker given patient’s fluctuating capacity.        Prognosis Estimate (survival in days, wks, mos, yrs):	 Guarded		  Patient Decision-Making Capacity:   Has capacity	 	  Lacks capacity		  Capacity waxes and wanes  Patient Aware of:  Diagnosis:   Yes    No   Unknown   		 Prognosis:   Yes    No   Unknown       Name of medical decision-maker should patient lack capacity: Alexa Pineda		Relationship: Daughter  Role:    Health Care Proxy       Legal Surrogate   Contact #(s): [phone]  Other Stake-Holders: Theo Montes, Friend [phone]; other children; brother  Evidence of Patient’s Preference of Life-Sustaining Treatment (Written or Oral): None				                 Resuscitation status:  DNR:  Yes   No      DNI:  Yes   No        Discussions:   Discussion with Salvador Jones MD (Medicine) on 11/3/22 (10:50 – 11:00): Reviewed patient’s medical record and social history. Patient admitted for altered mental status. Patient has a HCP form signed on 11/4, where she named her daughter. While patient currently lacks capacity (as deemed by medical team), her mental status waxes and wanes. On 11/4, it was noted that patient was able to make decisions regarding HCP. Patient now stating she would like her brother to be her surrogate decision maker. Ethics informed that if patient lacks capacity to name a HCP, then the HCP cannot be changed to her brother and patient’s daughter, current HCP, remains the HCP.  Discussion with Chely Farley (Patient) on 11/3/22 (11:30 – 11:50): Met with patient at bedside. Patient very amicable but not oriented. She stated she came into the hospital because her knee is swollen and when she was at work, she was advised to come in (patient was in fact brought into hospital by her friend). Patient says she has been in the hospital for 2 hours and has not yet been seen by any doctor. She also stated that she was waiting to go over the bridge to the other building to survey the premises and then to the stadium nearby. Patient works as a  for the park and has 3 children (1 daughter and 2 boys)—daughter is the eldest and lives in [place].   Discussion with Salvador Jones MD (Medicine) on 11/3/22 (12:00 – 12:05): Discussed visitation with patient and reaffirmed that per Ethics’ assessment, patient lacks capacity at this time to change HCP. If patient regains capacity, HCP can be changed.    Bioethics analysis:  The central ethical issue that exists in this case is (A) respect for the patient’s autonomy. The principle of respect for autonomous persons acknowledges a patient’s right to hold views, make choices and take actions based on his/her values and beliefs.1 Furthermore, this principle acknowledges the patient’s dignity and bodily integrity.1 Essential to this principle is the capacity for autonomous choice.1 In other words, the patient’s capacity to decide what can and cannot be done to her according to her set of values.     Capacity is the ability to comprehend, understand, appreciate and reason (C-U-A-R). Crucial to capacity is the ability to communicate an understanding of the option(s) at hand and an appreciation of the risks and benefits of pursing that option versus not. It is important to note that capacity is decision-specific and can thus wax and wane over time. In that regard, capacity is an ongoing process, not a single event or assessment, and it involves a willingness on the part of the practitioners to reassess capacity over time. Adults are presumed to have the capacity to made decisions, except when it can be demonstrated that they lack capacity.2 At this time, Ms. Farley has shown the inability to make medical decisions, including naming a surrogate decision maker, given her lack of orientation to her surroundings and condition.     When a patient does not have the ability to make decisions for herself, we must protect her autonomy by finding an appropriate surrogate decision maker. In this case, the patient has safeguarded her autonomy by previously choosing a Health Care Proxy (HCP).     Under [facility] Health Care Proxy Law,3 a competent adult may appoint a health care agent by a health care proxy, signed and dated by the adult in the presence of two adult witnesses who shall also sign the proxy. Another person may sign and date the health care proxy for the adult if the adult is unable to do so, at the adult's direction and in the adult's presence, and in the presence of two adult witnesses who shall sign the proxy.3 The witnesses shall state that the principal appeared to execute the proxy willingly and free from duress.3 The person appointed as agent shall not act as witness to execution of the health care proxy.3 The agent's authority shall commence upon a determination that the principal lacks capacity to make health care decisions.3    Individuals should choose a person they trust to protect their wishes and interests.4 Each person can appoint only one agent, but individuals may name an alternate agent.4 The alternate serves in place of the agent if one of three circumstances occurs: 1) the attending physician determines that the agent is not reasonably available, willing and competent to serve, and is not expected to become so in a manner that will permit him or her to make a timely decision given the patient’s medical circumstances; 2) a court disqualifies the agent from serving; 3) conditions described by the patient in the proxy occur.4 If the person appointed as the original agent becomes available to serve after an alternate agent has begun to make health care decisions, the alternate’s authority ends, and the original agent’s authority begins.4    A proxy remains effective indefinitely unless the proxy document includes an expiration date or a description of circumstances that trigger expiration.4 Even if a proxy contains an expiration date, if the patient loses decision-making capacity before that date, the agent continues to have decision-making authority for as long as the patient lacks capacity.4 An adult with capacity can revoke a health care proxy by any words or actions showing an intention to revoke, or by creating a new proxy.4

## 2022-11-08 NOTE — PROGRESS NOTE ADULT - PROBLEM SELECTOR PLAN 1
Pt with cerebellar deficits on physical exam. Hep C, HSV, VDRL all negative. CT Head reviewed, showed diffuse cerebral volume loss and extensive white matter changes. CTA H/N reviewed, showed no LVO or stenosis. Pt w/ history of MDD per friend. Ddx includes Wernicke-Korsakoff encephalopathy in the setting of chronic EtOH use. B12 and folate wnl on blood work. Pending MRI. Symptoms unlikely to be due to UTI, however will complete antibiotic course.    - Treat UTI with CTX x3 days (last day 11/6)  - Thiamine 500 mg IV, cont per neuro  - f/u neurology recs  - MRI: No acute intracranial pathology. F/u final read

## 2022-11-08 NOTE — PROGRESS NOTE ADULT - SUBJECTIVE AND OBJECTIVE BOX
NEUROLOGY CONSULT PROGRESS NOTE    INTERVAL HISTORY: She is comfortable in bed. Confabulates about where she has been last night. We walk with her but she is still very unstable.     REVIEW OF SYSTEMS:  As per HPI, otherwise negative for Constitutional, Eyes, Ears/Nose/Mouth/Throat, Neck, Cardiovascular, Respiratory, Gastrointestinal, Genitourinary, Skin, Endocrine, Musculoskeletal, Psychiatric, and Hematologic/Lymphatic.    MEDICATIONS:  acetaminophen     Tablet .. 650 milliGRAM(s) Oral every 6 hours PRN  aluminum hydroxide/magnesium hydroxide/simethicone Suspension 30 milliLiter(s) Oral every 4 hours PRN  enoxaparin Injectable 40 milliGRAM(s) SubCutaneous every 24 hours  folic acid 1 milliGRAM(s) Oral daily  magnesium oxide 400 milliGRAM(s) Oral every 24 hours  melatonin 3 milliGRAM(s) Oral at bedtime PRN  ondansetron Injectable 4 milliGRAM(s) IV Push every 8 hours PRN    VITAL SIGNS:  Vital Signs Last 24 Hrs  T(C): 36.4 (08 Nov 2022 05:44), Max: 36.7 (07 Nov 2022 22:50)  T(F): 97.5 (08 Nov 2022 05:44), Max: 98.1 (07 Nov 2022 22:50)  HR: 76 (08 Nov 2022 05:44) (76 - 98)  BP: 118/73 (08 Nov 2022 05:44) (118/73 - 147/82)  BP(mean): 103 (07 Nov 2022 17:40) (103 - 103)  RR: 18 (08 Nov 2022 05:44) (18 - 18)  SpO2: 98% (08 Nov 2022 05:44) (98% - 98%)    Parameters below as of 08 Nov 2022 05:44  Patient On (Oxygen Delivery Method): room air    PHYSICAL EXAMINATION:  Constitutional: WDWN; NAD  Eyes: anicteric sclera  Cardiovascular: +S1/S2, RRR; no M/R/G  Neurological Examination:  General:  Appearance is consistent with chronologic age.  No abnormal facies.  Gross skin survey within normal limits.    Cognitive/Language:  Awake, alert, and oriented to person, but not place, time, or date.  Nondysarthric. Language looks normal.   Cranial Nerves  - Eyes: Visual acuity intact, Visual fields full.  B/l upbeating nystagmus with upgaze and rightgaze.  - Face:  Facial sensation normal V1 - 3, no facial asymmetry.    - Ears/Nose/Throat:  Hearing grossly intact   Motor examination:  Upper Extremities: L 5/5, R 5/5; Lower extremities: L 5/5, R 5/5.  No observable drift. Normal tone and bulk. No tenderness, twitching, tremors or involuntary movements.  Sensory examination:   Intact to light touch and temperature throughout. Vibration diminished but present.   Reflexes:   2+ b/l biceps, brachioradialis, patella, 1+ achilles   Cerebellum:  mild B/l UE dysmetria and dysdiadochokinesia (L>R).  Gait very wide-based, able to take more than one step but still very unstable needing help    LABS:                          12.6   7.97  )-----------( 165      ( 08 Nov 2022 06:46 )             38.1     11-08    137  |  101  |  15  ----------------------------<  115<H>  5.0   |  26  |  0.83    Ca    9.8      08 Nov 2022 06:46  Phos  4.4     11-08  Mg     1.9     11-08    TPro  7.0  /  Alb  3.5  /  TBili  <0.2  /  DBili  x   /  AST  37  /  ALT  27  /  AlkPhos  91  11-08

## 2022-11-08 NOTE — CONSULT NOTE ADULT - CONSULT REASON
To assist the healthcare team in an ethical dilemma of a 62-year-old female admitted for altered mental status, currently with fluctuating capacity, regarding appropriate surrogate decision maker.

## 2022-11-08 NOTE — PROGRESS NOTE ADULT - PROBLEM SELECTOR PLAN 2
Per friend, pt with progressive LE weakness. CT T/L negative for any acute pathology  B12, folate wnl. Pending TSH. LE weakness improved on physical exam this AM.     Per neuro, patient unable to stand without assistance. PT eval for DONALD. Pending MRI for likely Wernicke-Korsakoff encephalopathy.    - f/u Neuro recs

## 2022-11-08 NOTE — PROGRESS NOTE ADULT - ATTENDING COMMENTS
MRI findings consistent with Wernicke encephalopathy   Dysmetria improved however still has severe gait ataxia  She is also confabulating more which indicates some degree of korsakoff syndrome  After completing 7 days of IV thiamine can transition to oral 100mg daily  Continue PT, dispo planning

## 2022-11-08 NOTE — PROGRESS NOTE ADULT - PROBLEM SELECTOR PLAN 3
Upon admission Alcohol level <10.  CIWA score 0 at the time of assessment by ICU team, s/p Ativan 1mg for agitation. For the last days friend has probably provided 1-2 beers per day to avoid withdrawal. Patient consumes 6 beers per day at baseline. Friend unsure if patient has history of seizures, intubation, previous alcohol withdrawals nor esophageal bleeds.     CIWA 0 this AM    Discontinue CIWA protocol    -ativan 2 mg IVP q2h/PRN   -fall precautions    -Thiamine 500 mg IV   -Folic acid 100 mg PO

## 2022-11-08 NOTE — PROGRESS NOTE ADULT - PROBLEM SELECTOR PLAN 4
Pt reports dysuria. UA positive. Treat for symptomatic UTI    Completed abx course. No further intervention.    - s/p CTX x 3days

## 2022-11-08 NOTE — PROGRESS NOTE ADULT - PROBLEM SELECTOR PLAN 6
Patient tachy to 108, likely in the setting of pain. No acute alcohol withdrawal. Tachycardia has resolved. EKG NSR    Resolved     -cont to monitor  -address pain prn    #Diffuse T wave inversion   Trop negative x1, unknown cardiovascular history. As per friend not taking any medications.  -repeat EKG in the morning, unchanged

## 2022-11-08 NOTE — PROGRESS NOTE ADULT - ASSESSMENT
62F poor historian PMHx of EtoH abuse (6 beers daily), MDD bib friend/roommate for AMS, difficulty ambulating and inability of caring for herself s/p recent falls, admitted for encephalopathy and inability to ambulate 2/2 LE weakness, s/p 3 day course of CTX for UTI. MRI brain prelim read w/o acute intracranial pathology.

## 2022-11-08 NOTE — PROGRESS NOTE ADULT - ASSESSMENT
per Internal Medicine    62 y o F poor historian PMHx of EtoH abuse (6 beers daily), MDD bib friend/roommate for AMS, difficulty ambulating and inability of caring for herself s/p recent falls, admitted for encephalopathy and inability to ambulate 2/2 LE weakness, s/p 3 day course of CTX for UTI. MRI brain prelim read w/o acute intracranial pathology.       Problem/Plan - 1:  ·  Problem: Encephalopathy.   ·  Plan: Pt with cerebellar deficits on physical exam. Hep C, HSV, VDRL all negative. CT Head reviewed, showed diffuse cerebral volume loss and extensive white matter changes. CTA H/N reviewed, showed no LVO or stenosis. Pt w/ history of MDD per friend. Ddx includes Wernicke-Korsakoff encephalopathy in the setting of chronic EtOH use. B12 and folate wnl on blood work. Pending MRI. Symptoms unlikely to be due to UTI, however will complete antibiotic course.    - Treat UTI with CTX x3 days (last day 11/6)  - Thiamine 500 mg IV, cont per neuro  - f/u neurology recs  - MRI: No acute intracranial pathology. F/u final read.    Problem/Plan - 2:  ·  Problem: Lower extremity weakness.   ·  Plan: Per friend, pt with progressive LE weakness. CT T/L negative for any acute pathology  B12, folate wnl. Pending TSH. LE weakness improved on physical exam this AM.     Per neuro, patient unable to stand without assistance. PT eval for DONALD. Pending MRI for likely Wernicke-Korsakoff encephalopathy.    - f/u Neuro recs.    Problem/Plan - 3:  ·  Problem: Alcohol withdrawal.   ·  Plan: Upon admission Alcohol level <10.  CIWA score 0 at the time of assessment by ICU team, s/p Ativan 1mg for agitation. For the last days friend has probably provided 1-2 beers per day to avoid withdrawal. Patient consumes 6 beers per day at baseline. Friend unsure if patient has history of seizures, intubation, previous alcohol withdrawals nor esophageal bleeds.     CIWA 0 this AM    Discontinue CIWA protocol    -ativan 2 mg IVP q2h/PRN   -fall precautions    -Thiamine 500 mg IV   -Folic acid 100 mg PO.    Problem/Plan - 4:  ·  Problem: UTI (urinary tract infection).   ·  Plan: Pt reports dysuria. UA positive. Treat for symptomatic UTI    Completed abx course. No further intervention.    - s/p CTX x 3days.    Problem/Plan - 5:  ·  Problem: Fall.   ·  Plan: CTH unremarkable. CT T/L Negative.    -Follow up Neuro recs  -Orthostatic BP once able/pt stable, pt remains unable to walk  - PT consult, rec DONALD.    Problem/Plan - 6:  ·  Problem: Tachycardia.   ·  Plan: Patient tachy to 108, likely in the setting of pain. No acute alcohol withdrawal. Tachycardia has resolved. EKG NSR    Resolved     -cont to monitor  -address pain prn    #Diffuse T wave inversion   Trop negative x1, unknown cardiovascular history. As per friend not taking any medications.  -repeat EKG in the morning, unchanged.    Problem/Plan - 7:  ·  Problem: MDD (major depressive disorder).   ·  Plan: - Per family, no known home meds.    Problem/Plan - 8:  ·  Problem: Prophylactic measure.   ·  Plan: F: PO  E: replete PRN, closely monitor BMP + Mg/Phos BID  Diet: NPO given AMS  DVT PPx: lovenox  GI PPx: none  Code: FULL  Dispo: F.

## 2022-11-08 NOTE — CONSULT NOTE ADULT - ASSESSMENT
Recommendation:  As patient currently lacks capacity, [facility] law and current ethical thinking support the right of the patient’s documented HCP, Alexa Pineda, to make medical decisions on the patient’s behalf.  Should patient regain decision-making capacity, a new HCP form can be filled out if she would like to change her current HCP.    Thank you for this challenging case. Please do not hesitate to call us if there are any questions.   	  					  Case discussed with Medical Ethics Attending: Neva Tovar MD, [facility]  More than 50% of the time of this consultation was spent in coordination of Care of Patient					      References: 		    1Beashameka TL & Rizwan JF. Principles of Biomedical Ethics. [place]: [employer]; 2019.   2VereMIRYAM stanford., JOSLYN Ruiz., PERLITA Araujo, & ALEX Diaz. (2017). Changes in cognition and decision making capacity following brain tumour resection: illustrated with two cases. Brain sciences, 7(10), 122.  3[place] [facility] § 2981; https://www.[place].gov/legislation/laws/[facility]/2981  4The Health Care Proxy Law Guidebook 1991 https://www.health.[place].gov/regulations/task_force/reports_publications/docs/the_health_care_proxy_law_guidebook.pdf

## 2022-11-09 LAB
ALBUMIN SERPL ELPH-MCNC: 3.3 G/DL — SIGNIFICANT CHANGE UP (ref 3.3–5)
ALP SERPL-CCNC: 77 U/L — SIGNIFICANT CHANGE UP (ref 40–120)
ALT FLD-CCNC: 23 U/L — SIGNIFICANT CHANGE UP (ref 10–45)
ANION GAP SERPL CALC-SCNC: 6 MMOL/L — SIGNIFICANT CHANGE UP (ref 5–17)
AST SERPL-CCNC: 26 U/L — SIGNIFICANT CHANGE UP (ref 10–40)
BILIRUB SERPL-MCNC: <0.2 MG/DL — SIGNIFICANT CHANGE UP (ref 0.2–1.2)
BLD GP AB SCN SERPL QL: NEGATIVE — SIGNIFICANT CHANGE UP
BUN SERPL-MCNC: 17 MG/DL — SIGNIFICANT CHANGE UP (ref 7–23)
CALCIUM SERPL-MCNC: 9.5 MG/DL — SIGNIFICANT CHANGE UP (ref 8.4–10.5)
CHLORIDE SERPL-SCNC: 106 MMOL/L — SIGNIFICANT CHANGE UP (ref 96–108)
CO2 SERPL-SCNC: 27 MMOL/L — SIGNIFICANT CHANGE UP (ref 22–31)
CREAT SERPL-MCNC: 0.74 MG/DL — SIGNIFICANT CHANGE UP (ref 0.5–1.3)
EGFR: 91 ML/MIN/1.73M2 — SIGNIFICANT CHANGE UP
FERRITIN SERPL-MCNC: 196 NG/ML — HIGH (ref 15–150)
GLUCOSE SERPL-MCNC: 114 MG/DL — HIGH (ref 70–99)
HCT VFR BLD CALC: 30.7 % — LOW (ref 34.5–45)
HCT VFR BLD CALC: 32 % — LOW (ref 34.5–45)
HGB BLD-MCNC: 10.4 G/DL — LOW (ref 11.5–15.5)
HGB BLD-MCNC: 9.9 G/DL — LOW (ref 11.5–15.5)
IRON SATN MFR SERPL: 23 % — SIGNIFICANT CHANGE UP (ref 14–50)
IRON SATN MFR SERPL: 62 UG/DL — SIGNIFICANT CHANGE UP (ref 30–160)
MAGNESIUM SERPL-MCNC: 1.4 MG/DL — LOW (ref 1.6–2.6)
MCHC RBC-ENTMCNC: 32.2 GM/DL — SIGNIFICANT CHANGE UP (ref 32–36)
MCHC RBC-ENTMCNC: 32.5 GM/DL — SIGNIFICANT CHANGE UP (ref 32–36)
MCHC RBC-ENTMCNC: 34 PG — SIGNIFICANT CHANGE UP (ref 27–34)
MCHC RBC-ENTMCNC: 34.7 PG — HIGH (ref 27–34)
MCV RBC AUTO: 105.5 FL — HIGH (ref 80–100)
MCV RBC AUTO: 106.7 FL — HIGH (ref 80–100)
NRBC # BLD: 0 /100 WBCS — SIGNIFICANT CHANGE UP (ref 0–0)
NRBC # BLD: 0 /100 WBCS — SIGNIFICANT CHANGE UP (ref 0–0)
PHOSPHATE SERPL-MCNC: 4.4 MG/DL — SIGNIFICANT CHANGE UP (ref 2.5–4.5)
PLATELET # BLD AUTO: 185 K/UL — SIGNIFICANT CHANGE UP (ref 150–400)
PLATELET # BLD AUTO: 190 K/UL — SIGNIFICANT CHANGE UP (ref 150–400)
POTASSIUM SERPL-MCNC: 4.7 MMOL/L — SIGNIFICANT CHANGE UP (ref 3.5–5.3)
POTASSIUM SERPL-SCNC: 4.7 MMOL/L — SIGNIFICANT CHANGE UP (ref 3.5–5.3)
PROT SERPL-MCNC: 6.1 G/DL — SIGNIFICANT CHANGE UP (ref 6–8.3)
RBC # BLD: 2.91 M/UL — LOW (ref 3.8–5.2)
RBC # BLD: 3 M/UL — LOW (ref 3.8–5.2)
RBC # BLD: 3.08 M/UL — LOW (ref 3.8–5.2)
RBC # FLD: 13.2 % — SIGNIFICANT CHANGE UP (ref 10.3–14.5)
RBC # FLD: 13.3 % — SIGNIFICANT CHANGE UP (ref 10.3–14.5)
RETICS #: 52.1 K/UL — SIGNIFICANT CHANGE UP (ref 25–125)
RETICS/RBC NFR: 1.7 % — SIGNIFICANT CHANGE UP (ref 0.5–2.5)
RH IG SCN BLD-IMP: POSITIVE — SIGNIFICANT CHANGE UP
SODIUM SERPL-SCNC: 139 MMOL/L — SIGNIFICANT CHANGE UP (ref 135–145)
TIBC SERPL-MCNC: 271 UG/DL — SIGNIFICANT CHANGE UP (ref 220–430)
TRANSFERRIN SERPL-MCNC: 225 MG/DL — SIGNIFICANT CHANGE UP (ref 200–360)
UIBC SERPL-MCNC: 209 UG/DL — SIGNIFICANT CHANGE UP (ref 110–370)
WBC # BLD: 7.52 K/UL — SIGNIFICANT CHANGE UP (ref 3.8–10.5)
WBC # BLD: 7.78 K/UL — SIGNIFICANT CHANGE UP (ref 3.8–10.5)
WBC # FLD AUTO: 7.52 K/UL — SIGNIFICANT CHANGE UP (ref 3.8–10.5)
WBC # FLD AUTO: 7.78 K/UL — SIGNIFICANT CHANGE UP (ref 3.8–10.5)

## 2022-11-09 PROCEDURE — 99231 SBSQ HOSP IP/OBS SF/LOW 25: CPT

## 2022-11-09 PROCEDURE — 99232 SBSQ HOSP IP/OBS MODERATE 35: CPT | Mod: GC

## 2022-11-09 RX ORDER — THIAMINE MONONITRATE (VIT B1) 100 MG
250 TABLET ORAL EVERY 24 HOURS
Refills: 0 | Status: DISCONTINUED | OUTPATIENT
Start: 2022-11-09 | End: 2022-11-12

## 2022-11-09 RX ORDER — THIAMINE MONONITRATE (VIT B1) 100 MG
250 TABLET ORAL EVERY 24 HOURS
Refills: 0 | Status: DISCONTINUED | OUTPATIENT
Start: 2022-11-09 | End: 2022-11-09

## 2022-11-09 RX ORDER — MAGNESIUM SULFATE 500 MG/ML
2 VIAL (ML) INJECTION ONCE
Refills: 0 | Status: COMPLETED | OUTPATIENT
Start: 2022-11-09 | End: 2022-11-09

## 2022-11-09 RX ADMIN — Medication 650 MILLIGRAM(S): at 06:33

## 2022-11-09 RX ADMIN — Medication 650 MILLIGRAM(S): at 07:33

## 2022-11-09 RX ADMIN — MAGNESIUM OXIDE 400 MG ORAL TABLET 400 MILLIGRAM(S): 241.3 TABLET ORAL at 18:40

## 2022-11-09 RX ADMIN — Medication 102.5 MILLIGRAM(S): at 13:43

## 2022-11-09 RX ADMIN — Medication 25 GRAM(S): at 11:34

## 2022-11-09 RX ADMIN — ENOXAPARIN SODIUM 40 MILLIGRAM(S): 100 INJECTION SUBCUTANEOUS at 06:17

## 2022-11-09 RX ADMIN — Medication 1 MILLIGRAM(S): at 11:34

## 2022-11-09 NOTE — PROGRESS NOTE ADULT - ASSESSMENT
62F with PMH of ETOH abuse (approx 6-8 beers daily, last drink 24 hours ago) brought in by friends for AMS and difficulty walking, falls. Currently admitted under medicine for AMS work up. Neurology consulted for recommendations. MRI head w/wo cont shows findings compatible with Wernicke's encephalopathy with periacueductual, mamillary and pulvinar findings, and chronic cerebellar atrophy. Improving neurologically after thiamine treatment.     Impression  - Given history of alcohol use and exam findings, radiology imaging and etiology of symptoms most consistent with chronic symmetric sensory polyneuropathy +/- Wernicke encephalopathy +/- chronic cerebellar ataxia 2/2 alcohol abuse. B12, folate, TSH, RPR has been normal.     Recommendations  - Continue with high dose thiamine IV, after 7 days can transition to oral   - Fall precautions  - PT/OT  - Continue rest of care as per primary team    Plan discussed with Attending Dr. Carrero. Note not complete until final attending attestation.   Jarad Witt MD  Neurology, PGY-2

## 2022-11-09 NOTE — PROGRESS NOTE ADULT - ATTENDING COMMENTS
She is more oriented today although still confabulating   She was able to take a few steps with support which is an improvement, although still very ataxic   Complete thiamine IV treatment, transition to PO, dispo planning to rehab

## 2022-11-09 NOTE — PROGRESS NOTE ADULT - PROBLEM SELECTOR PLAN 1
Pt with cerebellar deficits on physical exam. Hep C, HSV, VDRL all negative. CT Head reviewed, showed diffuse cerebral volume loss and extensive white matter changes. CTA H/N reviewed, showed no LVO or stenosis. Pt w/ history of MDD per friend. Ddx includes Wernicke-Korsakoff encephalopathy in the setting of chronic EtOH use. B12 and folate wnl on blood work. Pending MRI. Symptoms unlikely to be due to UTI, however will complete antibiotic course.    - s/o CTX x 3 days  - Thiamine 500 mg IV, cont. per neuro  - f/u neurology recs  - MRI: No acute intracranial pathology. F/u final read

## 2022-11-09 NOTE — PROGRESS NOTE ADULT - SUBJECTIVE AND OBJECTIVE BOX
INTERVAL HPI/OVERNIGHT EVENTS:  Patient was seen and examined at bedside. As per patient, no o/n events, patient resting comfortably. No complaints at this time. Patient continues to be confused. Patient denies: fever, chills, lightheadedness CP, palpitations, SOB, cough, N/V. ROS otherwise negative.    VITAL SIGNS:  T(F): 98.7 (11-09-22 @ 06:15)  HR: 94 (11-09-22 @ 06:15)  BP: 121/70 (11-09-22 @ 06:15)  RR: 18 (11-09-22 @ 06:15)  SpO2: 98% (11-09-22 @ 06:15)  Wt(kg): --        PHYSICAL EXAM:  Constitutional: thin elderly female resting comfortably in bed; NAD  HEENT: NC/AT, PER, anicteric sclera, no nasal discharge; dry MM  Neck: supple; no JVD or thyromegaly, no lymphadenopathy  Respiratory: CTA B/L; no W/R/R, no retractions  Cardiac: +S1/S2; RRR; no M/R/G  Gastrointestinal: soft, NT/ND; no rebound or guarding  Back: spine midline, no bony tenderness, stage 3 sacral ulcer  Extremities: WWP, no clubbing or cyanosis; no peripheral edema  Musculoskeletal: NROM x4; no joint swelling, tenderness or erythema. Normal  strength. 3/5 muscle strength b/l LE  Vascular: 2+ radial, DP/PT pulses B/L  Neurologic: AAOx2; Can move all extremities. No observed focal deficits. Sensation intact throughout. Dysmetria on cerebellar exam. No vertical nystagmus observed this AM  Psychiatric: affect and characteristics of appearance, verbalizations, behaviors are appropriate.    MEDICATIONS  (STANDING):  enoxaparin Injectable 40 milliGRAM(s) SubCutaneous every 24 hours  folic acid 1 milliGRAM(s) Oral daily  magnesium oxide 400 milliGRAM(s) Oral every 24 hours  magnesium sulfate  IVPB 2 Gram(s) IV Intermittent once  thiamine IVPB 250 milliGRAM(s) IV Intermittent every 24 hours    MEDICATIONS  (PRN):  acetaminophen     Tablet .. 650 milliGRAM(s) Oral every 6 hours PRN Temp greater or equal to 38C (100.4F), Mild Pain (1 - 3)  aluminum hydroxide/magnesium hydroxide/simethicone Suspension 30 milliLiter(s) Oral every 4 hours PRN Dyspepsia  melatonin 3 milliGRAM(s) Oral at bedtime PRN Insomnia  ondansetron Injectable 4 milliGRAM(s) IV Push every 8 hours PRN Nausea and/or Vomiting      Allergies    No Known Allergies    Intolerances        LABS:                        9.9    7.52  )-----------( 185      ( 09 Nov 2022 05:30 )             30.7     11-09    139  |  106  |  17  ----------------------------<  114<H>  4.7   |  27  |  0.74    Ca    9.5      09 Nov 2022 05:30  Phos  4.4     11-09  Mg     1.4     11-09    TPro  6.1  /  Alb  3.3  /  TBili  <0.2  /  DBili  x   /  AST  26  /  ALT  23  /  AlkPhos  77  11-09          RADIOLOGY & ADDITIONAL TESTS:  Reviewed

## 2022-11-09 NOTE — PROGRESS NOTE ADULT - ATTENDING COMMENTS
MRB with atrophy and moderate vascular disease  H/H with mild decrease, will repeat today, no evidence of acute blood loss but does have macrocytic anemia  plan for DONALD coleman in [place].

## 2022-11-09 NOTE — PROGRESS NOTE ADULT - SUBJECTIVE AND OBJECTIVE BOX
NEUROLOGY CONSULT PROGRESS NOTE    INTERVAL HISTORY:      REVIEW OF SYSTEMS:  As per HPI, otherwise negative for Constitutional, Eyes, Ears/Nose/Mouth/Throat, Neck, Cardiovascular, Respiratory, Gastrointestinal, Genitourinary, Skin, Endocrine, Musculoskeletal, Psychiatric, and Hematologic/Lymphatic.    MEDICATIONS:  acetaminophen     Tablet .. 650 milliGRAM(s) Oral every 6 hours PRN  aluminum hydroxide/magnesium hydroxide/simethicone Suspension 30 milliLiter(s) Oral every 4 hours PRN  enoxaparin Injectable 40 milliGRAM(s) SubCutaneous every 24 hours  folic acid 1 milliGRAM(s) Oral daily  magnesium oxide 400 milliGRAM(s) Oral every 24 hours  magnesium sulfate  IVPB 2 Gram(s) IV Intermittent once  melatonin 3 milliGRAM(s) Oral at bedtime PRN  ondansetron Injectable 4 milliGRAM(s) IV Push every 8 hours PRN  thiamine IVPB 250 milliGRAM(s) IV Intermittent every 24 hours    VITAL SIGNS:  Vital Signs Last 24 Hrs  T(C): 37.1 (09 Nov 2022 06:15), Max: 37.1 (09 Nov 2022 06:15)  T(F): 98.7 (09 Nov 2022 06:15), Max: 98.7 (09 Nov 2022 06:15)  HR: 94 (09 Nov 2022 06:15) (91 - 102)  BP: 121/70 (09 Nov 2022 06:15) (114/81 - 162/108)  BP(mean): --  RR: 18 (09 Nov 2022 06:15) (16 - 18)  SpO2: 98% (09 Nov 2022 06:15) (98% - 98%)    Parameters below as of 09 Nov 2022 06:15  Patient On (Oxygen Delivery Method): room air        PHYSICAL EXAMINATION:  Constitutional: WDWN; NAD  Eyes: anicteric sclera  Cardiovascular: +S1/S2, RRR; no M/R/G  Neurological Examination:  General:  Appearance is consistent with chronologic age.  No abnormal facies.  Gross skin survey within normal limits.    Cognitive/Language:  Awake, alert, and oriented to person, but not place, time, or date.  Nondysarthric. Language looks normal.   Cranial Nerves  - Eyes: Visual acuity intact, Visual fields full.  B/l upbeating nystagmus with upgaze and rightgaze.  - Face:  Facial sensation normal V1 - 3, no facial asymmetry.    - Ears/Nose/Throat:  Hearing grossly intact   Motor examination:  Upper Extremities: L 5/5, R 5/5; Lower extremities: L 5/5, R 5/5.  No observable drift. Normal tone and bulk. No tenderness, twitching, tremors or involuntary movements.  Sensory examination:   Intact to light touch and temperature throughout. Vibration diminished but present.   Reflexes:   2+ b/l biceps, brachioradialis, patella, 1+ achilles   Cerebellum:  mild B/l UE dysmetria and dysdiadochokinesia (L>R).  Gait very wide-based, able to take more than one step but still very unstable needing help    LABS:                          9.9    7.52  )-----------( 185      ( 09 Nov 2022 05:30 )             30.7     11-09    139  |  106  |  17  ----------------------------<  114<H>  4.7   |  27  |  0.74    Ca    9.5      09 Nov 2022 05:30  Phos  4.4     11-09  Mg     1.4     11-09    TPro  6.1  /  Alb  3.3  /  TBili  <0.2  /  DBili  x   /  AST  26  /  ALT  23  /  AlkPhos  77  11-09 NEUROLOGY CONSULT PROGRESS NOTE    INTERVAL HISTORY: Patient feeling ok today. She comments she has been walking with nursing supervision. She believes her gait has improved. She is good spirits.     REVIEW OF SYSTEMS:  As per HPI, otherwise negative for Constitutional, Eyes, Ears/Nose/Mouth/Throat, Neck, Cardiovascular, Respiratory, Gastrointestinal, Genitourinary, Skin, Endocrine, Musculoskeletal, Psychiatric, and Hematologic/Lymphatic.    MEDICATIONS:  acetaminophen     Tablet .. 650 milliGRAM(s) Oral every 6 hours PRN  aluminum hydroxide/magnesium hydroxide/simethicone Suspension 30 milliLiter(s) Oral every 4 hours PRN  enoxaparin Injectable 40 milliGRAM(s) SubCutaneous every 24 hours  folic acid 1 milliGRAM(s) Oral daily  magnesium oxide 400 milliGRAM(s) Oral every 24 hours  magnesium sulfate  IVPB 2 Gram(s) IV Intermittent once  melatonin 3 milliGRAM(s) Oral at bedtime PRN  ondansetron Injectable 4 milliGRAM(s) IV Push every 8 hours PRN  thiamine IVPB 250 milliGRAM(s) IV Intermittent every 24 hours    VITAL SIGNS:  Vital Signs Last 24 Hrs  T(C): 37.1 (09 Nov 2022 06:15), Max: 37.1 (09 Nov 2022 06:15)  T(F): 98.7 (09 Nov 2022 06:15), Max: 98.7 (09 Nov 2022 06:15)  HR: 94 (09 Nov 2022 06:15) (91 - 102)  BP: 121/70 (09 Nov 2022 06:15) (114/81 - 162/108)  BP(mean): --  RR: 18 (09 Nov 2022 06:15) (16 - 18)  SpO2: 98% (09 Nov 2022 06:15) (98% - 98%)    Parameters below as of 09 Nov 2022 06:15  Patient On (Oxygen Delivery Method): room air        PHYSICAL EXAMINATION:  Constitutional: WDWN; NAD  Eyes: anicteric sclera  Cardiovascular: +S1/S2, RRR; no M/R/G  Neurological Examination:  General:  Appearance is consistent with chronologic age.  No abnormal facies.  Gross skin survey within normal limits.    Cognitive/Language:  Awake, alert, and oriented to person, but not place, time, or date.  Nondysarthric. Language looks normal.   Cranial Nerves  - Eyes: Visual acuity intact, Visual fields full.  B/l upbeating nystagmus with upgaze and rightgaze.  - Face:  Facial sensation normal V1 - 3, no facial asymmetry.    - Ears/Nose/Throat:  Hearing grossly intact   Motor examination:  Upper Extremities: L 5/5, R 5/5; Lower extremities: L 5/5, R 5/5.  No observable drift. Normal tone and bulk. No tenderness, twitching, tremors or involuntary movements.  Sensory examination:   Intact to light touch and temperature throughout. Vibration diminished but present.   Reflexes:   2+ b/l biceps, brachioradialis, patella, 1+ achilles   Cerebellum:  mild B/l UE dysmetria and dysdiadochokinesia (L>R).  Gait very wide-based, able to take more than one step but still very unstable needing help    LABS:                          9.9    7.52  )-----------( 185      ( 09 Nov 2022 05:30 )             30.7     11-09    139  |  106  |  17  ----------------------------<  114<H>  4.7   |  27  |  0.74    Ca    9.5      09 Nov 2022 05:30  Phos  4.4     11-09  Mg     1.4     11-09    TPro  6.1  /  Alb  3.3  /  TBili  <0.2  /  DBili  x   /  AST  26  /  ALT  23  /  AlkPhos  77  11-09

## 2022-11-10 LAB — SARS-COV-2 RNA SPEC QL NAA+PROBE: SIGNIFICANT CHANGE UP

## 2022-11-10 PROCEDURE — 99231 SBSQ HOSP IP/OBS SF/LOW 25: CPT

## 2022-11-10 PROCEDURE — 99232 SBSQ HOSP IP/OBS MODERATE 35: CPT | Mod: GC

## 2022-11-10 RX ADMIN — Medication 102.5 MILLIGRAM(S): at 12:33

## 2022-11-10 RX ADMIN — ENOXAPARIN SODIUM 40 MILLIGRAM(S): 100 INJECTION SUBCUTANEOUS at 05:45

## 2022-11-10 RX ADMIN — Medication 1 MILLIGRAM(S): at 12:02

## 2022-11-10 RX ADMIN — MAGNESIUM OXIDE 400 MG ORAL TABLET 400 MILLIGRAM(S): 241.3 TABLET ORAL at 17:16

## 2022-11-10 NOTE — CHART NOTE - NSCHARTNOTEFT_GEN_A_CORE
Admitting Diagnosis:   Patient is a 62y old  Female who presents with a chief complaint of Weakness, AMS (10 Nov 2022 08:54)    PAST MEDICAL & SURGICAL HISTORY:  ETOH abuse    Current Nutrition Order:  regular    PO Intake: Good (%) [   ]  Fair (50-75%) [ x ] Poor (<25%) [   ]    GI Issues:   No N/V/D/C noted    Pain:  No pain noted    Skin Integrity:  stage 2 pressure injury to sacrum  stage 2 pressure injury to right and left buttocks    Labs:       139  |  106  |  17  ----------------------------<  114<H>  4.7   |  27  |  0.74    Ca    9.5      2022 05:30  Phos  4.4       Mg     1.4         TPro  6.1  /  Alb  3.3  /  TBili  <0.2  /  DBili  x   /  AST  26  /  ALT  23  /  AlkPhos  77      CAPILLARY BLOOD GLUCOSE    Medications:  MEDICATIONS  (STANDING):  enoxaparin Injectable 40 milliGRAM(s) SubCutaneous every 24 hours  folic acid 1 milliGRAM(s) Oral daily  magnesium oxide 400 milliGRAM(s) Oral every 24 hours  thiamine IVPB 250 milliGRAM(s) IV Intermittent every 24 hours    MEDICATIONS  (PRN):  acetaminophen     Tablet .. 650 milliGRAM(s) Oral every 6 hours PRN Temp greater or equal to 38C (100.4F), Mild Pain (1 - 3)  aluminum hydroxide/magnesium hydroxide/simethicone Suspension 30 milliLiter(s) Oral every 4 hours PRN Dyspepsia  melatonin 3 milliGRAM(s) Oral at bedtime PRN Insomnia  ondansetron Injectable 4 milliGRAM(s) IV Push every 8 hours PRN Nausea and/or Vomiting    Height for BMI (FEET)	5 Feet  Height for BMI (INCHES)	4 Inch(s)  Height for BMI (CENTIMETERS)	162.56 Centimeter(s)  Weight for BMI (lbs)	114 lb  Weight for BMI (kg)	51.7 kg  Body Mass Index	19.5     Weight Change:     Estimated energy needs:   ABW used for calculations as pt between % of IBW (95%), adjusted for age, wound healing  25-30kcal/k-1551kcal  1.2-1.5g/k-78gprotein  30ml/kml fluid    Subjective:     Previous Nutrition Diagnosis: Mild PCM RT inadequate energy intake to meet nutrition needs AEB sudden decrease in PO intake, wt loss    Active [ x  ]  Resolved [   ]    If resolved, new PES:     Goal: Pt to meet >75% estimated nutrition needs consistently.     Recommendations:    Education:     Risk Level: High [   ] Moderate [   ] Low [   ] Admitting Diagnosis:   Patient is a 62y old  Female who presents with a chief complaint of Weakness, AMS (10 Nov 2022 08:54)    PAST MEDICAL & SURGICAL HISTORY:  ETOH abuse    Current Nutrition Order:  regular    PO Intake: Good (%) [   ]  Fair (50-75%) [ x ] Poor (<25%) [   ]    GI Issues:   No N/V/D/C noted    Pain:  No pain noted    Skin Integrity:  stage 2 pressure injury to sacrum  stage 2 pressure injury to right and left buttocks    Labs:       139  |  106  |  17  ----------------------------<  114<H>  4.7   |  27  |  0.74    Ca    9.5      2022 05:30  Phos  4.4       Mg     1.4         TPro  6.1  /  Alb  3.3  /  TBili  <0.2  /  DBili  x   /  AST  26  /  ALT  23  /  AlkPhos  77      CAPILLARY BLOOD GLUCOSE    Medications:  MEDICATIONS  (STANDING):  enoxaparin Injectable 40 milliGRAM(s) SubCutaneous every 24 hours  folic acid 1 milliGRAM(s) Oral daily  magnesium oxide 400 milliGRAM(s) Oral every 24 hours  thiamine IVPB 250 milliGRAM(s) IV Intermittent every 24 hours    MEDICATIONS  (PRN):  acetaminophen     Tablet .. 650 milliGRAM(s) Oral every 6 hours PRN Temp greater or equal to 38C (100.4F), Mild Pain (1 - 3)  aluminum hydroxide/magnesium hydroxide/simethicone Suspension 30 milliLiter(s) Oral every 4 hours PRN Dyspepsia  melatonin 3 milliGRAM(s) Oral at bedtime PRN Insomnia  ondansetron Injectable 4 milliGRAM(s) IV Push every 8 hours PRN Nausea and/or Vomiting    Height for BMI (FEET)	5 Feet  Height for BMI (INCHES)	4 Inch(s)  Height for BMI (CENTIMETERS)	162.56 Centimeter(s)  Weight for BMI (lbs)	114 lb  Weight for BMI (kg)	51.7 kg  Body Mass Index	19.5     Weight Change: Pt with reported wt loss PTA (11lb/8.8%)    Estimated energy needs:   ABW used for calculations as pt between % of IBW (95%), adjusted for age, wound healing  25-30kcal/k-1551kcal  1.2-1.5g/k-78gprotein  30ml/kml fluid    Subjective:   62F with PMH of ETOH abuse (approx 6-8 beers daily, last drink 24 hours ago) brought in by friends for AMS and difficulty walking, falls. Currently admitted under medicine for AMS work up. Neurology consulted for recommendations. MRI head w/wo cont shows findings compatible with Wernicke's encephalopathy with periacueductual, mamillary and pulvinar findings, and chronic cerebellar atrophy. Improving neurologically after thiamine treatment.     Pt seen in room for nutrition assessment. Continues on regular diet, observed pt eating ~50% of meal. No reports of chewing/swallowing difficulty. Pt denies N/V/D/C. Denies pain. RD discussed importance of optimal PO intake, encouraged intake as tolerated. Pt expressed understanding. Please see full nutrition recommendations below. Will continue to follow per RD protocol.      Previous Nutrition Diagnosis: Mild PCM RT inadequate energy intake to meet nutrition needs AEB sudden decrease in PO intake, wt loss    Active [ x  ]  Resolved [   ]    If resolved, new PES:     Goal: Pt to meet >75% estimated nutrition needs consistently.     Recommendations:  1. Continue regular diet  2. Monitor tolerance to PO and encourage PO intake  3. Monitor lytes and replete  4. RD to remain available prn     Education: Encouraged PO intake    Risk Level: High [ x ] Moderate [   ] Low [   ]

## 2022-11-10 NOTE — PROGRESS NOTE ADULT - SUBJECTIVE AND OBJECTIVE BOX
NEUROLOGY CONSULT PROGRESS NOTE    INTERVAL HISTORY:  Patient stable neurologically. In good spirits.    REVIEW OF SYSTEMS:  As per HPI, otherwise negative for Constitutional, Eyes, Ears/Nose/Mouth/Throat, Neck, Cardiovascular, Respiratory, Gastrointestinal, Genitourinary, Skin, Endocrine, Musculoskeletal, Psychiatric, and Hematologic/Lymphatic.    MEDICATIONS:  acetaminophen     Tablet .. 650 milliGRAM(s) Oral every 6 hours PRN  aluminum hydroxide/magnesium hydroxide/simethicone Suspension 30 milliLiter(s) Oral every 4 hours PRN  enoxaparin Injectable 40 milliGRAM(s) SubCutaneous every 24 hours  folic acid 1 milliGRAM(s) Oral daily  magnesium oxide 400 milliGRAM(s) Oral every 24 hours  melatonin 3 milliGRAM(s) Oral at bedtime PRN  ondansetron Injectable 4 milliGRAM(s) IV Push every 8 hours PRN  thiamine IVPB 250 milliGRAM(s) IV Intermittent every 24 hours    VITAL SIGNS:  Vital Signs Last 24 Hrs  T(C): 36.9 (10 Nov 2022 12:04), Max: 36.9 (10 Nov 2022 12:04)  T(F): 98.4 (10 Nov 2022 12:04), Max: 98.4 (10 Nov 2022 12:04)  HR: 100 (10 Nov 2022 12:04) (84 - 100)  BP: 103/68 (10 Nov 2022 12:04) (103/68 - 124/78)  BP(mean): --  RR: 18 (10 Nov 2022 12:04) (16 - 18)  SpO2: 97% (10 Nov 2022 12:04) (97% - 99%)    Parameters below as of 10 Nov 2022 12:04  Patient On (Oxygen Delivery Method): room air    PHYSICAL EXAMINATION:  Constitutional: WDWN; NAD  Eyes: anicteric sclera  Cardiovascular: +S1/S2, RRR; no M/R/G  Neurological Examination:  General:  Appearance is consistent with chronologic age.  No abnormal facies.  Gross skin survey within normal limits.    Cognitive/Language:  Awake, alert, and oriented to person, but not place, time, or date.  Nondysarthric. Language looks normal.   Cranial Nerves  - Eyes: Visual acuity intact, Visual fields full.  B/l upbeating nystagmus with upgaze and rightgaze.  - Face:  Facial sensation normal V1 - 3, no facial asymmetry.    - Ears/Nose/Throat:  Hearing grossly intact   Motor examination:  Upper Extremities: L 5/5, R 5/5; Lower extremities: L 5/5, R 5/5.  No observable drift. Normal tone and bulk. No tenderness, twitching, tremors or involuntary movements.  Sensory examination:   Intact to light touch and temperature throughout. Vibration diminished but present.   Reflexes:   2+ b/l biceps, brachioradialis, patella, 1+ achilles   Cerebellum:  mild B/l UE dysmetria and dysdiadochokinesia (L>R).  Gait very wide-based, able to take more than one step but still very unstable needing help    LABS:                          10.4   7.78  )-----------( 190      ( 09 Nov 2022 17:03 )             32.0     11-09    139  |  106  |  17  ----------------------------<  114<H>  4.7   |  27  |  0.74    Ca    9.5      09 Nov 2022 05:30  Phos  4.4     11-09  Mg     1.4     11-09    TPro  6.1  /  Alb  3.3  /  TBili  <0.2  /  DBili  x   /  AST  26  /  ALT  23  /  AlkPhos  77  11-09

## 2022-11-10 NOTE — PROGRESS NOTE ADULT - PROBLEM SELECTOR PLAN 3
Upon admission Alcohol level <10.  CIWA score 0 at the time of assessment by ICU team, s/p Ativan 1mg for agitation. For the last days friend has probably provided 1-2 beers per day to avoid withdrawal. Patient consumes 6 beers per day at baseline. Friend unsure if patient has history of seizures, intubation, previous alcohol withdrawals nor esophageal bleeds.     CIWA 0     Discontinue CIWA protocol    -fall precautions  -Thiamine 500 mg IV   -Folic acid 100 mg PO

## 2022-11-10 NOTE — PROGRESS NOTE ADULT - ASSESSMENT
62F with PMH of ETOH abuse (approx 6-8 beers daily, last drink 24 hours ago) brought in by friends for AMS and difficulty walking, falls. Currently admitted under medicine for AMS work up. Neurology consulted for recommendations. MRI head w/wo cont shows findings compatible with Wernicke's encephalopathy with periacueductual, mamillary and pulvinar findings, and chronic cerebellar atrophy. Improving neurologically after thiamine treatment.     Impression  - Given history of alcohol use and exam findings, radiology imaging and etiology of symptoms most consistent with chronic symmetric sensory polyneuropathy +/- Wernicke encephalopathy +/- chronic cerebellar ataxia 2/2 alcohol abuse. B12, folate, TSH, RPR has been normal.     Recommendations  - Continue with high dose thiamine IV, after 7 days can transition to oral   - Fall precautions  - PT/OT  - Continue rest of care as per primary team  - Neurology will sign off of this case. Should you have any further question, please contact our team again to reevaluate pt    Plan discussed with Attending Dr. Carrero. Note not complete until final attending attestation.   Jarad Witt MD  Neurology, PGY-2

## 2022-11-10 NOTE — PROGRESS NOTE ADULT - PROBLEM SELECTOR PLAN 2
Per friend, pt with progressive LE weakness. CT T/L negative for any acute pathology  B12, folate wnl. Pending TSH. LE weakness improved on physical exam this AM.     Per neuro, patient unable to stand without assistance 2/2 Wernicke-Korsakoff encephalopathy. PT rec for DONALD.       - f/u Neuro recs

## 2022-11-10 NOTE — PROGRESS NOTE ADULT - ATTENDING COMMENTS
Wernicke encephalopathy, dysmetria in limbs improved, however still with severe gait ataxia and confabulation, indicating a degree of korsakoff syndrome  Continue thiamine  neurology will sign off, call back with further questions

## 2022-11-10 NOTE — PROGRESS NOTE ADULT - PROBLEM SELECTOR PLAN 1
Pt with cerebellar deficits on physical exam. Hep C, HSV, VDRL all negative. CT Head reviewed, showed diffuse cerebral volume loss and extensive white matter changes. CTA H/N reviewed, showed no LVO or stenosis. Pt w/ history of MDD per friend. Ddx includes Wernicke-Korsakoff encephalopathy in the setting of chronic EtOH use. B12 and folate wnl on blood work. Pending MRI. Symptoms unlikely to be due to UTI, however will complete antibiotic course.    - s/p CTX x 3 days  - Thiamine 500 mg IV, cont. per neuro  - f/u neurology recs  - MRI: consistent with Wernicke's encephalopathy

## 2022-11-10 NOTE — PROGRESS NOTE ADULT - ASSESSMENT
62F poor historian PMHx of EtoH abuse (6 beers daily), MDD bib friend/roommate for AMS, difficulty ambulating and inability of caring for herself s/p recent falls, admitted for encephalopathy and inability to ambulate 2/2 LE weakness, s/p 3 day course of CTX for UTI. MRI brain consistent w/ Wernicke's encephalopathy.

## 2022-11-10 NOTE — PROGRESS NOTE ADULT - ATTENDING COMMENTS
[place] [facility] [place] and proof of [place]    otherwise she is stable.

## 2022-11-10 NOTE — PROGRESS NOTE ADULT - SUBJECTIVE AND OBJECTIVE BOX
INTERVAL HPI/OVERNIGHT EVENTS:  Patient was seen and examined at bedside. As per patient, no o/n events, patient resting comfortably. No complaints at this time. Pt remains confused, requesting to go home. Patient denies: fever, chills, lightheadedness, weakness, CP, palpitations, SOB, cough, N/V. ROS otherwise negative.    VITAL SIGNS:  T(F): 98 (11-10-22 @ 05:12)  HR: 84 (11-10-22 @ 05:12)  BP: 122/77 (11-10-22 @ 05:12)  RR: 17 (11-10-22 @ 05:12)  SpO2: 98% (11-10-22 @ 05:12)  Wt(kg): --        PHYSICAL EXAM:    Constitutional: thin elderly female resting comfortably in bed; NAD  HEENT: NC/AT, PER, anicteric sclera, no nasal discharge; dry MM  Neck: supple; no JVD or thyromegaly, no lymphadenopathy  Respiratory: CTA B/L; no W/R/R, no retractions  Cardiac: +S1/S2; RRR; no M/R/G  Gastrointestinal: soft, NT/ND; no rebound or guarding  Back: spine midline, no bony tenderness, stage 3 sacral ulcer  Extremities: WWP, no clubbing or cyanosis; no peripheral edema  Musculoskeletal: NROM x4; no joint swelling, tenderness or erythema. Normal  strength. 3/5 muscle strength b/l LE  Vascular: 2+ radial, DP/PT pulses B/L  Neurologic: AAOx2; Can move all extremities. No observed focal deficits. Sensation intact throughout. No vertical nystagmus observed this AM  Psychiatric: affect and characteristics of appearance, verbalizations, behaviors are appropriate.    MEDICATIONS  (STANDING):  enoxaparin Injectable 40 milliGRAM(s) SubCutaneous every 24 hours  folic acid 1 milliGRAM(s) Oral daily  magnesium oxide 400 milliGRAM(s) Oral every 24 hours  thiamine IVPB 250 milliGRAM(s) IV Intermittent every 24 hours    MEDICATIONS  (PRN):  acetaminophen     Tablet .. 650 milliGRAM(s) Oral every 6 hours PRN Temp greater or equal to 38C (100.4F), Mild Pain (1 - 3)  aluminum hydroxide/magnesium hydroxide/simethicone Suspension 30 milliLiter(s) Oral every 4 hours PRN Dyspepsia  melatonin 3 milliGRAM(s) Oral at bedtime PRN Insomnia  ondansetron Injectable 4 milliGRAM(s) IV Push every 8 hours PRN Nausea and/or Vomiting      Allergies    No Known Allergies    Intolerances        LABS:                        10.4   7.78  )-----------( 190      ( 09 Nov 2022 17:03 )             32.0     11-09    139  |  106  |  17  ----------------------------<  114<H>  4.7   |  27  |  0.74    Ca    9.5      09 Nov 2022 05:30  Phos  4.4     11-09  Mg     1.4     11-09    TPro  6.1  /  Alb  3.3  /  TBili  <0.2  /  DBili  x   /  AST  26  /  ALT  23  /  AlkPhos  77  11-09          RADIOLOGY & ADDITIONAL TESTS:  Reviewed

## 2022-11-11 ENCOUNTER — TRANSCRIPTION ENCOUNTER (OUTPATIENT)
Age: 62
End: 2022-11-11

## 2022-11-11 PROCEDURE — 99232 SBSQ HOSP IP/OBS MODERATE 35: CPT | Mod: GC

## 2022-11-11 RX ORDER — THIAMINE MONONITRATE (VIT B1) 100 MG
1 TABLET ORAL
Qty: 30 | Refills: 0
Start: 2022-11-11 | End: 2022-12-10

## 2022-11-11 RX ORDER — FOLIC ACID 0.8 MG
1 TABLET ORAL
Qty: 30 | Refills: 0
Start: 2022-11-11 | End: 2022-12-10

## 2022-11-11 RX ADMIN — Medication 102.5 MILLIGRAM(S): at 12:08

## 2022-11-11 RX ADMIN — Medication 1 MILLIGRAM(S): at 12:07

## 2022-11-11 RX ADMIN — MAGNESIUM OXIDE 400 MG ORAL TABLET 400 MILLIGRAM(S): 241.3 TABLET ORAL at 17:21

## 2022-11-11 NOTE — DISCHARGE NOTE PROVIDER - NSDCMRMEDTOKEN_GEN_ALL_CORE_FT
folic acid 1 mg oral tablet: 1 tab(s) orally once a day   Vitamin B1 100 mg oral tablet: 1 tab(s) orally once a day   thiamine 100 mg oral tablet: 1 tab(s) orally once a day

## 2022-11-11 NOTE — PROGRESS NOTE ADULT - SUBJECTIVE AND OBJECTIVE BOX
Physical Medicine and Rehabilitation Progress Note :       Patient is a 62y old  Female who presents with a chief complaint of Weakness, AMS (10 Nov 2022 08:54)      HPI:  Emergency contact/child/HCP: Alexa Pineda [phone]    *HPI obtained from friend Theo Montes [phone]*  61 yo F poor historian PMHx of EtoH abuse (6 beers daily), MDD brought by friend/roommate for AMS, difficulty ambulating with recent falls, and inability of caring for herself. As per friend patient fell 1 month ago, with trauma to the head but unsure if the patient seeked medical attention at that time. Friend noted that last Sunday he found pt on the floor of her apartment (thinks she fell but not sure) pt did not let him help stating she just wanted to stretch so he left for work but when he returned 6+ hrs later she had not moved from that position. At that time he picked her up and placed on bed and since that event pt has been predominantly bedbound as not able to move 2/2 LE weakness.     Friend states since the weekend pt has been progressively altered, not eating or taking care of herself. He has been giving her approx 1-2 beers daily to "prevent alcohol withdrawal", last drink on 11/3 AM. Friend also reports patient has been confused and altered saying her mother is still alive, and saying people are around the house although there is none for the past 2 weeks. Friend unsure if patient has history of previous alcohol withdrawals, hospitalizations, seizures, but does report she has been doing cocaine "occasionally" as per the friend and she smokes cigarettes 1 pack per day. Per friend a month ago pt was AOX3 and able of caring for herself.     Pt reports having burning with urination, doesn't remember when symptoms started. Denies f/c, headache, neck/back pain, chest pain, sob, urinary/bowel incontinence, abd pain, nvd, numbness/weakness, paresthesia, saddle anesthesia.    ED course  VS: T 97.9, , /79, RR 18, SpO2 99% on RA  Labs: WBC 9.77, Hgb wnl, .3, PLt 143, K 2.8 --> 3.8, LFTs wnl, trop neg, alc<10  EKG: diffuse T wave inversion I, II, III, aVF, V3-V6  CT Head reviewed, showed diffuse cerebral volume loss and extensive white matter changes  CTA H/N reviewed, showed no LVO or stenosis  CT C-spine reviewed, showed multilevel degenerative disease without acute fractures  CXR showed no acute cardiopulmonary process  Interventions: Magnesium 2 mg IV x1, KCl 20 mEq IV x3 doses, KCl 40 mEq PO x 1, Lorazepam 1 mg IV x1, Thiamine 100 mg IV x1, 1L NS, IV acyclovir for 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   (04 Nov 2022 01:08)                            10.4   7.78  )-----------( 190      ( 09 Nov 2022 17:03 )             32.0             Vital Signs Last 24 Hrs  T(C): 36.4 (11 Nov 2022 10:37), Max: 37.1 (10 Nov 2022 21:18)  T(F): 97.6 (11 Nov 2022 10:37), Max: 98.7 (10 Nov 2022 21:18)  HR: 100 (11 Nov 2022 10:37) (98 - 100)  BP: 144/79 (11 Nov 2022 10:37) (103/68 - 165/76)  BP(mean): --  RR: 18 (11 Nov 2022 10:37) (18 - 18)  SpO2: 98% (11 Nov 2022 10:37) (97% - 99%)    Parameters below as of 11 Nov 2022 10:37  Patient On (Oxygen Delivery Method): room air        MEDICATIONS  (STANDING):  enoxaparin Injectable 40 milliGRAM(s) SubCutaneous every 24 hours  folic acid 1 milliGRAM(s) Oral daily  magnesium oxide 400 milliGRAM(s) Oral every 24 hours  thiamine IVPB 250 milliGRAM(s) IV Intermittent every 24 hours    MEDICATIONS  (PRN):  acetaminophen     Tablet .. 650 milliGRAM(s) Oral every 6 hours PRN Temp greater or equal to 38C (100.4F), Mild Pain (1 - 3)  aluminum hydroxide/magnesium hydroxide/simethicone Suspension 30 milliLiter(s) Oral every 4 hours PRN Dyspepsia  melatonin 3 milliGRAM(s) Oral at bedtime PRN Insomnia  ondansetron Injectable 4 milliGRAM(s) IV Push every 8 hours PRN Nausea and/or Vomiting       Physical Therapy Functional Status Assessment :   11/10/2022       Cognitive/Neuro/Behavioral  Cognitive/Neuro/Behavioral [WDL Definition: Alert; opens eyes spontaneously; arouses to voice or touch; oriented x 4; follows commands; speech spontaneous, logical; purposeful motor response; behavior appropriate to situation]: WDL except  Level of Consciousness: confused  Arousal Level: opens eyes spontaneously  Orientation: disoriented to;  place;  time;  situation  Speech: clear;  spontaneous;  well paced  Mood/Behavior: calm;  cooperative    Language Assistance  Preferred Language to Address Healthcare Preferred Language to Address Healthcare: English    Therapeutic Interventions      Bed Mobility  Bed Mobility Training Sit-to-Supine: contact guard  Bed Mobility Training Supine-to-Sit: contact guard  Bed Mobility Training Limitations: decreased strength;  impaired balance    Sit-Stand Transfer Training  Transfer Training Sit-to-Stand Transfer: minimum assist (75% patient effort);  1 person assist;  verbal cues;  rolling walker  Transfer Training Stand-to-Sit Transfer: minimum assist (75% patient effort);  1 person assist;  verbal cues;  rolling walker  Sit-to-Stand Transfer Training Transfer Safety Analysis: decreased weight-shifting ability;  decreased strength;  impaired balance    Gait Training  Gait Training: contact guard;  verbal cues;  1 person assist;  rolling walker;  50ft x 2  Gait Analysis: 2-point gait   patient with midly unsteady gait, no LOB, wide HARRY;  decreased helio;  decreased step length;  decreased strength;  impaired balance  Gait Number of Times:: x 1  Type of Rest Type of Rest: standing  Duration of Rest Duration of Rest: 30 seconds     Therapeutic Exercise  Therapeutic Exercise Detail: sitting EOB patient performed LAQ x 10, seated marches x 10           PM&R Impression : as above    Current Disposition Plan Recommendations :    subacute rehab placement

## 2022-11-11 NOTE — DISCHARGE NOTE PROVIDER - CARE PROVIDER_API CALL
Jarrod Carrero)  Neurology; Neuromuscular Medicine  [address], [address] [place] [address]  Phone: [phone]  Fax: [phone]  Follow Up Time: 2 weeks

## 2022-11-11 NOTE — PROGRESS NOTE ADULT - SUBJECTIVE AND OBJECTIVE BOX
INTERVAL HPI/OVERNIGHT EVENTS:  Patient was seen and examined at bedside. As per patient, no o/n events, patient resting comfortably. No complaints at this time. Pt remains at baseline confusion. Patient denies: fever, chills, lightheadedness, weakness, CP, palpitations, SOB, cough, N/V. ROS otherwise negative.    VITAL SIGNS:  T(F): 98.3 (11-11-22 @ 05:17)  HR: 99 (11-11-22 @ 05:17)  BP: 165/76 (11-11-22 @ 05:17)  RR: 18 (11-11-22 @ 05:17)  SpO2: 99% (11-11-22 @ 05:17)  Wt(kg): --        PHYSICAL EXAM:    Constitutional: thin elderly female resting comfortably in bed; NAD  HEENT: NC/AT, PER, anicteric sclera, no nasal discharge; dry MM  Neck: supple; no JVD or thyromegaly, no lymphadenopathy  Respiratory: CTA B/L; no W/R/R, no retractions  Cardiac: +S1/S2; RRR; no M/R/G  Gastrointestinal: soft, NT/ND; no rebound or guarding  Back: spine midline, no bony tenderness, stage 3 sacral ulcer  Extremities: WWP, no clubbing or cyanosis; no peripheral edema  Musculoskeletal: NROM x4; no joint swelling, tenderness or erythema. Normal  strength. 3/5 muscle strength b/l LE  Vascular: 2+ radial, DP/PT pulses B/L  Neurologic: AAOx2; Can move all extremities. No observed focal deficits. Sensation intact throughout. No vertical nystagmus observed this AM  Psychiatric: affect and characteristics of appearance, verbalizations, behaviors are appropriate.    MEDICATIONS  (STANDING):  enoxaparin Injectable 40 milliGRAM(s) SubCutaneous every 24 hours  folic acid 1 milliGRAM(s) Oral daily  magnesium oxide 400 milliGRAM(s) Oral every 24 hours  thiamine IVPB 250 milliGRAM(s) IV Intermittent every 24 hours    MEDICATIONS  (PRN):  acetaminophen     Tablet .. 650 milliGRAM(s) Oral every 6 hours PRN Temp greater or equal to 38C (100.4F), Mild Pain (1 - 3)  aluminum hydroxide/magnesium hydroxide/simethicone Suspension 30 milliLiter(s) Oral every 4 hours PRN Dyspepsia  melatonin 3 milliGRAM(s) Oral at bedtime PRN Insomnia  ondansetron Injectable 4 milliGRAM(s) IV Push every 8 hours PRN Nausea and/or Vomiting      Allergies    No Known Allergies    Intolerances        LABS:                        10.4   7.78  )-----------( 190      ( 09 Nov 2022 17:03 )             32.0                 RADIOLOGY & ADDITIONAL TESTS:  Reviewed

## 2022-11-11 NOTE — DISCHARGE NOTE PROVIDER - DETAILS OF MALNUTRITION DIAGNOSIS/DIAGNOSES
This patient has been assessed with a concern for Malnutrition and was treated during this hospitalization for the following Nutrition diagnosis/diagnoses:     -  11/04/2022: Mild protein-calorie malnutrition

## 2022-11-11 NOTE — DISCHARGE NOTE NURSING/CASE MANAGEMENT/SOCIAL WORK - NSDCFUADDAPPT_GEN_ALL_CORE_FT
[facility] [address] [place] [address] 
[phone] Patient: Marysol Marie MRN: [id number] PRY:3[birth year]25/[birth year] A check haven indicates which time of day the medication should be taken. My Medications START taking these medications Instructions Each Dose to Equal  
 Morning Noon Evening Bedtime  
 oxyCODONE-acetaminophen 5-325 mg per tablet Commonly known as:  PERCOCET Your last dose was: Your next dose is: Take 1 Tab by mouth every four (4) hours as needed. Max Daily Amount: 6 Tabs. 1 Tab  
    
   
   
   
  
 polyethylene glycol 17 gram/dose powder Commonly known as:  Mario [place] Your last dose was: Your next dose is: Take 17 g by mouth daily. 17 g CONTINUE taking these medications Instructions Each Dose to Equal  
 Morning Noon Evening Bedtime  
 amLODIPine 10 mg tablet Commonly known as:  Sole Santizo Your last dose was: Your next dose is: Take 10 mg by mouth daily. 10 mg  
    
   
   
   
  
 aspirin 325 mg tablet Commonly known as:  ASPIRIN Your last dose was: Your next dose is: Take 325 mg by mouth daily. 325 mg  
    
   
   
   
  
 atorvastatin 10 mg tablet Commonly known as:  LIPITOR Your last dose was: Your next dose is: Take 10 mg by mouth daily. 10 mg CALCIUM 600 WITH VITAMIN D3 600 mg(1,500mg) -400 unit Chew Generic drug:  Calcium-Cholecalciferol (D3) Your last dose was: Your next dose is: Take 1 Tab by mouth daily. 1 Tab CENTRUM SILVER ULTRA WOMEN'S PO Your last dose was: Your next dose is: Take  by mouth.  
     
   
   
   
  
 gabapentin 300 mg capsule Commonly known as:  NEURONTIN Your last dose was: Your next dose is: Take 300 mg by mouth. 300 mg  
    
   
   
   
  
 meclizine 25 mg tablet Commonly known as:  ANTIVERT Your last dose was: Your next dose is: Take 25 mg by mouth as needed. 25 mg  
    
   
   
   
  
 metoprolol tartrate 50 mg tablet Commonly known as:  LOPRESSOR Your last dose was: Your next dose is: Take 50 mg by mouth two (2) times a day. 50 mg  
    
   
   
   
  
 oxybutynin 5 mg tablet Commonly known as:  IIQOFLKO Your last dose was: Your next dose is: Take 15 mg by mouth two (2) times a day. 15 mg PLAVIX 75 mg Tab Generic drug:  clopidogrel Your last dose was: Your next dose is: Take 75 mg by mouth daily. 75 mg  
    
   
   
   
  
 * traMADol 50 mg tablet Commonly known as:  ULTRAM  
   
Your last dose was: Your next dose is: Take 50 mg by mouth nightly. 50 mg  
    
   
   
   
  
 * TRAMADOL PO Your last dose was: Your next dose is: Take 100 mg by mouth every morning. 100 mg * Notice: This list has 2 medication(s) that are the same as other medications prescribed for you. Read the directions carefully, and ask your doctor or other care provider to review them with you. Where to Get Your Medications Information on where to get these meds will be given to you by the nurse or doctor. ! Ask your nurse or doctor about these medications  
  oxyCODONE-acetaminophen 5-325 mg per tablet  
 polyethylene glycol 17 gram/dose powder We were unable to schedule an appointment with a Neurologist as they were out of network with your insurance.  Please follow up with [facility] and [facility] by calling [phone] to schedule an appointment with a Primary Care Doctor who can then refer you to see a Neurology specialist. Please bring your discharge paperwork when that appointment is made.    Appointment was facilitated by Ms. BALDEMAR Marie, Referral Coordinator.

## 2022-11-11 NOTE — PROGRESS NOTE ADULT - ASSESSMENT
per Internal Medicine    62 y o F poor historian PMHx of EtoH abuse (6 beers daily), MDD bib friend/roommate for AMS, difficulty ambulating and inability of caring for herself s/p recent falls, admitted for encephalopathy and inability to ambulate 2/2 LE weakness, s/p 3 day course of CTX for UTI. MRI brain consistent w/ Wernicke's encephalopathy.    Nutritional Assessment:  · Nutritional Assessment	This patient has been assessed with a concern for Malnutrition and has been determined to have a diagnosis/diagnoses of Mild protein-calorie malnutrition.    This patient is being managed with:   Diet Regular-  Entered: Nov 4 2022 11:12AM    Problem/Plan - 1:  ·  Problem: Encephalopathy.   ·  Plan: Pt with cerebellar deficits on physical exam. Hep C, HSV, VDRL all negative. CT Head reviewed, showed diffuse cerebral volume loss and extensive white matter changes. CTA H/N reviewed, showed no LVO or stenosis. Pt w/ history of MDD per friend. Ddx includes Wernicke-Korsakoff encephalopathy in the setting of chronic EtOH use. B12 and folate wnl on blood work. Pending MRI. Symptoms unlikely to be due to UTI, however will complete antibiotic course.    - s/p CTX x 3 days  - Thiamine 500 mg IV, cont. per neuro  - f/u neurology recs  - MRI: consistent with Wernicke's encephalopathy.    Problem/Plan - 2:  ·  Problem: Lower extremity weakness.   ·  Plan: Per friend, pt with progressive LE weakness. CT T/L negative for any acute pathology  B12, folate wnl. Pending TSH. LE weakness improved on physical exam this AM.     Per neuro, patient unable to stand without assistance 2/2 Wernicke-Korsakoff encephalopathy. PT rec for DONALD.       - f/u Neuro recs.    Problem/Plan - 3:  ·  Problem: Alcohol withdrawal.   ·  Plan: Upon admission Alcohol level <10.  CIWA score 0 at the time of assessment by ICU team, s/p Ativan 1mg for agitation. For the last days friend has probably provided 1-2 beers per day to avoid withdrawal. Patient consumes 6 beers per day at baseline. Friend unsure if patient has history of seizures, intubation, previous alcohol withdrawals nor esophageal bleeds.     CIWA 0     Discontinue CIWA protocol    -fall precautions  -Thiamine 500 mg IV   -Folic acid 100 mg PO.    Problem/Plan - 4:  ·  Problem: UTI (urinary tract infection).   ·  Plan: Pt reports dysuria. UA positive. Treat for symptomatic UTI    Completed abx course. No further intervention.    - s/p CTX x 3days.    Problem/Plan - 5:  ·  Problem: Fall.   ·  Plan: CTH unremarkable. CT T/L Negative.    -Follow up Neuro recs  -Orthostatic BP once able/pt stable, pt remains unable to walk  - PT consult, rec DONALD.    Problem/Plan - 6:  ·  Problem: Tachycardia.   ·  Plan: Patient tachy to 108, likely in the setting of pain. No acute alcohol withdrawal. Tachycardia has resolved. EKG NSR    Resolved     -cont to monitor  -address pain prn    #Diffuse T wave inversion   Trop negative x1, unknown cardiovascular history. As per friend not taking any medications.  -repeat EKG in the morning, unchanged.    Problem/Plan - 7:  ·  Problem: MDD (major depressive disorder).   ·  Plan: - Per family, no known home meds.    Problem/Plan - 8:  ·  Problem: Prophylactic measure.   ·  Plan: F: PO  E: replete PRN, closely monitor BMP + Mg/Phos BID  Diet: NPO given AMS  DVT PPx: lovenox  GI PPx: none  Code: FULL  Dispo: [facility].

## 2022-11-11 NOTE — DISCHARGE NOTE PROVIDER - HOSPITAL COURSE
#Discharge: do not delete    63 y/o F w/ PMHxof EtoH abuse (6 beers daily) and MDD p/w AMS, difficulty ambulating and inability of caring for herself s/p recent falls, admitted for encephalopathy and inability to ambulate 2/2 LE weakness, s/p 3 day course of CTX for UTI. MRI brain consistent w/ Wernicke's encephalopathy.    Problem List/Main Diagnoses (system-based):  #Encephalopathy  Pt with cerebellar deficits on physical exam. Hep C, HSV, VDRL all negative. CT Head reviewed, showed diffuse cerebral volume loss and extensive white matter changes. CTA H/N reviewed, showed no LVO or stenosis. Pt w/ history of MDD per friend. Ddx includes Wernicke-Korsakoff encephalopathy in the setting of chronic EtOH use. B12 and folate wnl on blood work. Symptoms unlikely to be due to UTI, however will complete antibiotic course. MRI consistent w/ Wernicke's encephalopathy    - s/p CTX x 3 days  - High dose IV thiamine until 11/11  - Outpatient f/u w/ Neurology  - MRI: consistent with Wernicke's encephalopathy.    #Lower extremity weakness.   Per friend, pt with progressive LE weakness. CT T/L negative for any acute pathology. B12, folate wnl. TSH WNL. LE weakness improved on physical exam. Patient unable to stand without assistance 2/2 Wernicke-Korsakoff encephalopathy. PT rec for DONALD.     #Alcohol withdrawal.   Upon admission Alcohol level <10.  CIWA score 0 at the time of assessment by ICU team, s/p Ativan 1mg for agitation. For the last days friend has probably provided 1-2 beers per day to avoid withdrawal. Patient consumes 6 beers per day at baseline. Friend unsure if patient has history of seizures, intubation, previous alcohol withdrawals nor esophageal bleeds.     -IV Thiamine, transition to oral after 11/11  -Folic acid 100 mg PO.    #UTI (urinary tract infection).   Pt reports dysuria. UA positive. Treat for symptomatic UTI    - s/p CTX x 3days.    #Fall.   CTH unremarkable. CT T/L Negative. MRI consistent w/ Wernicke's encephalopathy.    - outpatient f/u w/ Neuro  - cont. w/ thiamine  - PT consult, rec DOANLD.    #Tachycardia.   Patient tachy to 108, likely in the setting of pain. No acute alcohol withdrawal. Tachycardia has resolved. EKG NSR    Resolved     #Diffuse T wave inversion   Trop negative x1, unknown cardiovascular history. As per friend not taking any medications.  -repeat EKG in the morning, unchanged.    #MDD (major depressive disorder).   - Per family, no known home meds.    Patient was [facility] to: [facility]  New medications: thiamine, folate  Changes to old medications: none  Medications that were stopped: none  Items to follow up as outpatient: PCP, Neurology  Physical exam at the time of discharge:    Constitutional: thin elderly female resting comfortably in bed; NAD  HEENT: NC/AT, PER, anicteric sclera, no nasal discharge; dry MM  Neck: supple; no JVD or thyromegaly, no lymphadenopathy  Respiratory: CTA B/L; no W/R/R, no retractions  Cardiac: +S1/S2; RRR; no M/R/G  Gastrointestinal: soft, NT/ND; no rebound or guarding  Back: spine midline, no bony tenderness, stage 3 sacral ulcer  Extremities: WWP, no clubbing or cyanosis; no peripheral edema  Musculoskeletal: NROM x4; no joint swelling, tenderness or erythema. Normal  strength. 3/5 muscle strength b/l LE  Vascular: 2+ radial, DP/PT pulses B/L  Neurologic: AAOx2; Can move all extremities. No observed focal deficits. Sensation intact throughout.   Psychiatric: affect and characteristics of appearance, verbalizations, behaviors are appropriate. #Discharge: do not delete    63 y/o F w/ PMHxof EtoH abuse (6 beers daily) and MDD p/w AMS, difficulty ambulating and inability of caring for herself s/p recent falls, admitted for encephalopathy and inability to ambulate 2/2 LE weakness, s/p 3 day course of CTX for UTI. MRI brain consistent w/ Wernicke's encephalopathy.    Problem List/Main Diagnoses  #Encephalopathy  Pt with cerebellar deficits on physical exam. Hep C, HSV, VDRL all negative. CT Head reviewed, showed diffuse cerebral volume loss and extensive white matter changes. CTA H/N reviewed, showed no LVO or stenosis. Pt w/ history of MDD per friend. Ddx includes Wernicke-Korsakoff encephalopathy in the setting of chronic EtOH use. B12 and folate wnl on blood work. Symptoms unlikely to be due to UTI, however will complete antibiotic course. MRI consistent w/ Wernicke's encephalopathy  - s/p CTX x 3 days  - High dose IV thiamine until 11/11  - discharge on PO thiamine 100mg QD  - Outpatient f/u w/ Neurology  - MRI: consistent with Wernicke's encephalopathy.    #Lower extremity weakness.   Per friend, pt with progressive LE weakness. CT T/L negative for any acute pathology. B12, folate wnl. TSH WNL. LE weakness improved on physical exam. Patient unable to stand without assistance 2/2 Wernicke-Korsakoff encephalopathy. PT rec for DONALD.     #Alcohol-use disorder  Upon admission Alcohol level <10.  CIWA score 0 at the time of assessment by ICU team, s/p Ativan 1mg for agitation. For the last days friend has probably provided 1-2 beers per day to avoid withdrawal. Patient consumes 6 beers per day at baseline. Friend unsure if patient has history of seizures, intubation, previous alcohol withdrawals nor esophageal bleeds.   -IV Thiamine, transition to oral after 11/11  -Folic acid 100 mg PO.    #UTI (urinary tract infection).   Pt reports dysuria. UA positive. Treat for symptomatic UTI  - s/p CTX x 3days.    #Fall.   CTH unremarkable. CT T/L Negative. MRI consistent w/ Wernicke's encephalopathy.  - outpatient f/u w/ Neuro  - cont. w/ thiamine  - PT consult, rec home w/ home PT      #Diffuse T wave inversion   Trop negative x1, unknown cardiovascular history. As per friend not taking any medications.  -repeat EKG in the morning, unchanged.    #MDD (major depressive disorder).   - Per family, no known home meds.    Patient was discharged to: home w/ home PT  New medications: thiamine, folate  Changes to old medications: none  Medications that were stopped: none  Items to follow up as outpatient: PCP, Neurology  Physical exam at the time of discharge:    Constitutional: thin elderly female resting comfortably in bed; NAD  HEENT: NC/AT, PER, anicteric sclera, no nasal discharge; dry MM  Neck: supple; no JVD or thyromegaly, no lymphadenopathy  Respiratory: CTA B/L; no W/R/R, no retractions  Cardiac: +S1/S2; RRR; no M/R/G  Gastrointestinal: soft, NT/ND; no rebound or guarding  Back: spine midline, no bony tenderness, stage 3 sacral ulcer  Extremities: WWP, no clubbing or cyanosis; no peripheral edema  Musculoskeletal: NROM x4; no joint swelling, tenderness or erythema. Normal  strength. 3/5 muscle strength b/l LE  Vascular: 2+ radial, DP/PT pulses B/L  Neurologic: AAOx2; Can move all extremities. No observed focal deficits. Sensation intact throughout.   Psychiatric: affect and characteristics of appearance, verbalizations, behaviors are appropriate. #Discharge: do not delete    63 y/o F w/ PMHxof EtoH abuse (6 beers daily) and MDD p/w AMS, difficulty ambulating and inability of caring for herself s/p recent falls, admitted for encephalopathy and inability to ambulate 2/2 LE weakness, s/p 3 day course of CTX for UTI. MRI brain consistent w/ Wernicke's encephalopathy.    Problem List/Main Diagnoses  #Encephalopathy  Pt with cerebellar deficits on physical exam. Hep C, HSV, VDRL all negative. CT Head reviewed, showed diffuse cerebral volume loss and extensive white matter changes. CTA H/N reviewed, showed no LVO or stenosis. Pt w/ history of MDD per friend. Ddx includes Wernicke-Korsakoff encephalopathy in the setting of chronic EtOH use. B12 and folate wnl on blood work. Symptoms unlikely to be due to UTI, however will complete antibiotic course. MRI consistent w/ Wernicke's encephalopathy  - s/p CTX x 3 days  - High dose IV thiamine until 11/11  - discharge on PO thiamine 100mg QD  - Outpatient f/u w/ Neurology  - MRI: consistent with Wernicke's encephalopathy.    #Lower extremity weakness.   Per friend, pt with progressive LE weakness. CT T/L negative for any acute pathology. B12, folate wnl. TSH WNL. LE weakness improved on physical exam. Patient unable to stand without assistance 2/2 Wernicke-Korsakoff encephalopathy. PT rec for DONALD.     #Alcohol-use disorder  Upon admission Alcohol level <10.  CIWA score 0 at the time of assessment by ICU team, s/p Ativan 1mg for agitation. For the last days friend has probably provided 1-2 beers per day to avoid withdrawal. Patient consumes 6 beers per day at baseline. Friend unsure if patient has history of seizures, intubation, previous alcohol withdrawals nor esophageal bleeds.   -IV Thiamine, transition to oral after 11/11  -Folic acid 100 mg PO.    #UTI (urinary tract infection).   Pt reports dysuria. UA positive. Treat for symptomatic UTI  - s/p CTX x 3days.    #Fall.   CTH unremarkable. CT T/L Negative. MRI consistent w/ Wernicke's encephalopathy.  - outpatient f/u w/ Neuro  - cont. w/ thiamine  - PT consult, rec home w/ home PT    #Diffuse T wave inversion   Trop negative x1, unknown cardiovascular history. As per friend not taking any medications.  -repeat EKG in the morning, unchanged.    #MDD (major depressive disorder).   - Per family, no known home meds.    Patient was discharged to: home w/ home PT  New medications: thiamine, folate  Changes to old medications: none  Medications that were stopped: none  Items to follow up as outpatient: PCP, Neurology  Physical exam at the time of discharge:    Constitutional: thin elderly female resting comfortably in bed; NAD  HEENT: NC/AT, PER, anicteric sclera, no nasal discharge; dry MM  Neck: supple; no JVD or thyromegaly, no lymphadenopathy  Respiratory: CTA B/L; no W/R/R, no retractions  Cardiac: +S1/S2; RRR; no M/R/G  Gastrointestinal: soft, NT/ND; no rebound or guarding  Back: spine midline, no bony tenderness, stage 3 sacral ulcer  Extremities: WWP, no clubbing or cyanosis; no peripheral edema  Musculoskeletal: NROM x4; no joint swelling, tenderness or erythema. Normal  strength. 3/5 muscle strength b/l LE  Vascular: 2+ radial, DP/PT pulses B/L  Neurologic: AAOx2; Can move all extremities. No observed focal deficits. Sensation intact throughout.   Psychiatric: affect and characteristics of appearance, verbalizations, behaviors are appropriate. #Discharge: do not delete    63 y/o F w/ PMHxof EtoH abuse (6 beers daily) and MDD p/w AMS, difficulty ambulating and inability of caring for herself s/p recent falls, admitted for encephalopathy and inability to ambulate 2/2 LE weakness, s/p 3 day course of CTX for UTI. MRI brain consistent w/ Wernicke's encephalopathy.    Problem List/Main Diagnoses  #Encephalopathy  Pt with cerebellar deficits on physical exam. Hep C, HSV, VDRL all negative. CT Head reviewed, showed diffuse cerebral volume loss and extensive white matter changes. CTA H/N reviewed, showed no LVO or stenosis. Pt w/ history of MDD per friend. Ddx includes Wernicke-Korsakoff encephalopathy in the setting of chronic EtOH use. B12 and folate wnl on blood work. Symptoms unlikely to be due to UTI, however will complete antibiotic course. MRI consistent w/ Wernicke's encephalopathy  - s/p CTX x 3 days  - High dose IV thiamine until 11/11  - discharge on PO thiamine 100mg QD  - Outpatient f/u w/ Neurology  - MRI: consistent with Wernicke's encephalopathy.    #Lower extremity weakness.   Per friend, pt with progressive LE weakness. CT T/L negative for any acute pathology. B12, folate wnl. TSH WNL. LE weakness improved on physical exam. Patient unable to stand without assistance 2/2 Wernicke-Korsakoff encephalopathy. PT rec for DONALD.     #Alcohol-use disorder  Upon admission Alcohol level <10.  CIWA score 0 at the time of assessment by ICU team, s/p Ativan 1mg for agitation. For the last days friend has probably provided 1-2 beers per day to avoid withdrawal. Patient consumes 6 beers per day at baseline. Friend unsure if patient has history of seizures, intubation, previous alcohol withdrawals nor esophageal bleeds.   -IV Thiamine, transition to oral after 11/11  -Folic acid 100 mg PO.    #UTI (urinary tract infection).   Pt reports dysuria. UA positive. Treat for symptomatic UTI  - s/p CTX x 3days.    #Fall.   CTH unremarkable. CT T/L Negative. MRI consistent w/ Wernicke's encephalopathy.  - outpatient f/u w/ Neuro  - cont. w/ thiamine  - PT consult, rec DONALD    #Diffuse T wave inversion   Trop negative x1, unknown cardiovascular history. As per friend not taking any medications.  -repeat EKG in the morning, unchanged.    #MDD (major depressive disorder).   - Per family, no known home meds.    Patient was discharged to: [facility]  New medications: thiamine, folate  Changes to old medications: none  Medications that were stopped: none  Items to follow up as outpatient: PCP, Neurology  Physical exam at the time of discharge:    Constitutional: thin elderly female resting comfortably in bed; NAD  HEENT: NC/AT, PER, anicteric sclera, no nasal discharge; dry MM  Neck: supple; no JVD or thyromegaly, no lymphadenopathy  Respiratory: CTA B/L; no W/R/R, no retractions  Cardiac: +S1/S2; RRR; no M/R/G  Gastrointestinal: soft, NT/ND; no rebound or guarding  Back: spine midline, no bony tenderness, stage 3 sacral ulcer  Extremities: WWP, no clubbing or cyanosis; no peripheral edema  Musculoskeletal: NROM x4; no joint swelling, tenderness or erythema. Normal  strength. 3/5 muscle strength b/l LE  Vascular: 2+ radial, DP/PT pulses B/L  Neurologic: AAOx2; Can move all extremities. No observed focal deficits. Sensation intact throughout.   Psychiatric: affect and characteristics of appearance, verbalizations, behaviors are appropriate.

## 2022-11-11 NOTE — DISCHARGE NOTE PROVIDER - ATTENDING DISCHARGE PHYSICAL EXAMINATION:
Constitutional:  NAD  Respiratory: CTA B/L; no W/R/R, no retractions  Cardiac: +S1/S2; RRR; no M/R/G  Gastrointestinal: soft, NT/ND; no rebound or guarding  Back: spine midline, no bony tenderness, stage 3 sacral ulcer  Extremities: WWP, no clubbing or cyanosis; no peripheral edema  Musculoskeletal: NROM x4; no joint swelling, tenderness or erythema. Normal  strength. 3/5 muscle strength b/l LE

## 2022-11-11 NOTE — PROGRESS NOTE ADULT - PROBLEM SELECTOR PROBLEM 3
Alcohol withdrawal
UTI (urinary tract infection)

## 2022-11-11 NOTE — PROGRESS NOTE ADULT - PROBLEM SELECTOR PLAN 7
- Per family, no known home meds
- Med rec pending collateral from family
- Med rec pending collateral from family
- Per family, no known home meds
- Med rec pending collateral from family

## 2022-11-11 NOTE — DISCHARGE NOTE PROVIDER - NSDCCPCAREPLAN_GEN_ALL_CORE_FT
PRINCIPAL DISCHARGE DIAGNOSIS  Diagnosis: Wernicke's encephalopathy  Assessment and Plan of Treatment: Wernicke's encephalopathy is a degenerative brain disorder caused by the lack of thiamine (vitamin B1). It may result from alcohol abuse, dietary deficiencies, prolonged vomiting, eating disorders, or the effects of chemotherapy. B1 deficiency causes damage to the brain's thalamus and hypothalamus  You were treated for Wernicke's encephalopathy with IV Thiamine for 7 days. You will need to conitnue taking thiamine and folate supplements upon leaving the hospital. You will also need to continue physical therapy to work on your strength and being able to walk independently.   It is important that you follow-up with a neurologist after being discharged.      SECONDARY DISCHARGE DIAGNOSES  Diagnosis: Difficulty walking  Assessment and Plan of Treatment:      PRINCIPAL DISCHARGE DIAGNOSIS  Diagnosis: Wernicke's encephalopathy  Assessment and Plan of Treatment: Wernicke's encephalopathy is a degenerative brain disorder caused by the lack of thiamine (vitamin B1). It may result from alcohol abuse, dietary deficiencies, prolonged vomiting, eating disorders, or the effects of chemotherapy. B1 deficiency causes damage to the brain's thalamus and hypothalamus  You were treated for Wernicke's encephalopathy with IV Thiamine for 7 days. You will need to conitnue taking thiamine and folate supplements upon leaving the hospital. You will also need to continue physical therapy to work on your strength and being able to walk independently.   It is important that you follow-up with a neurologist after being discharged.

## 2022-11-11 NOTE — PROGRESS NOTE ADULT - PROBLEM SELECTOR PROBLEM 2
Lower extremity weakness
Alcohol withdrawal
Lower extremity weakness
Alcohol withdrawal
Alcohol withdrawal

## 2022-11-11 NOTE — PROGRESS NOTE ADULT - NUTRITIONAL ASSESSMENT
This patient has been assessed with a concern for Malnutrition and has been determined to have a diagnosis/diagnoses of Mild protein-calorie malnutrition.    This patient is being managed with:   Diet Regular-  Entered: Nov 4 2022 11:12AM    

## 2022-11-11 NOTE — PROGRESS NOTE ADULT - ATTENDING COMMENTS
no complaints today  given patient unable to provide documents necessary for DONALD, daughter amenable to taking her home in [place] and evelio will supervise 24/7 per my discussion with ROLANDO.

## 2022-11-11 NOTE — PROGRESS NOTE ADULT - PROBLEM SELECTOR PROBLEM 1
Encephalopathy

## 2022-11-11 NOTE — PROGRESS NOTE ADULT - PROBLEM SELECTOR PROBLEM 7
MDD (major depressive disorder)

## 2022-11-11 NOTE — PROGRESS NOTE ADULT - PROVIDER SPECIALTY LIST ADULT
Neurology
Neurology
Rehab Medicine
Internal Medicine
Internal Medicine
Neurology
Neurology
Rehab Medicine
Hospitalist
Neurology
Rehab Medicine
Internal Medicine

## 2022-11-11 NOTE — PROGRESS NOTE ADULT - PROBLEM SELECTOR PROBLEM 4
Lower extremity weakness
Lower extremity weakness
UTI (urinary tract infection)
Lower extremity weakness
UTI (urinary tract infection)

## 2022-11-11 NOTE — DISCHARGE NOTE NURSING/CASE MANAGEMENT/SOCIAL WORK - PATIENT PORTAL LINK FT
You can access the FollowMyHealth Patient Portal offered by [facility] by registering at the following website: [email or web address]. By joining [facility]’s FollowMyHealth portal, you will also be able to view your health information using other applications (apps) compatible with our system.

## 2022-11-12 ENCOUNTER — EMERGENCY (EMERGENCY)
Facility: HOSPITAL | Age: 62
LOS: 1 days | Discharge: HOME | End: 2022-11-12
Admitting: EMERGENCY MEDICINE
Payer: MEDICAID

## 2022-11-12 VITALS
RESPIRATION RATE: 18 BRPM | SYSTOLIC BLOOD PRESSURE: 134 MMHG | OXYGEN SATURATION: 99 % | TEMPERATURE: 98 F | DIASTOLIC BLOOD PRESSURE: 85 MMHG | HEART RATE: 95 BPM

## 2022-11-12 VITALS
HEART RATE: 118 BPM | TEMPERATURE: 98 F | SYSTOLIC BLOOD PRESSURE: 150 MMHG | OXYGEN SATURATION: 100 % | RESPIRATION RATE: 16 BRPM | HEIGHT: 64 IN | DIASTOLIC BLOOD PRESSURE: 86 MMHG

## 2022-11-12 DIAGNOSIS — E51.2 WERNICKE'S ENCEPHALOPATHY: ICD-10-CM

## 2022-11-12 DIAGNOSIS — F32.9 MAJOR DEPRESSIVE DISORDER, SINGLE EPISODE, UNSPECIFIED: ICD-10-CM

## 2022-11-12 DIAGNOSIS — Z20.822 CONTACT WITH AND (SUSPECTED) EXPOSURE TO COVID-19: ICD-10-CM

## 2022-11-12 DIAGNOSIS — Z87.440 PERSONAL HISTORY OF URINARY (TRACT) INFECTIONS: ICD-10-CM

## 2022-11-12 LAB
ALBUMIN SERPL ELPH-MCNC: 3.9 G/DL — SIGNIFICANT CHANGE UP (ref 3.3–5)
ALP SERPL-CCNC: 92 U/L — SIGNIFICANT CHANGE UP (ref 40–120)
ALT FLD-CCNC: 31 U/L — SIGNIFICANT CHANGE UP (ref 10–45)
ANION GAP SERPL CALC-SCNC: 10 MMOL/L — SIGNIFICANT CHANGE UP (ref 5–17)
ANISOCYTOSIS BLD QL: SLIGHT — SIGNIFICANT CHANGE UP
AST SERPL-CCNC: 31 U/L — SIGNIFICANT CHANGE UP (ref 10–40)
BASOPHILS # BLD AUTO: 0 K/UL — SIGNIFICANT CHANGE UP (ref 0–0.2)
BASOPHILS NFR BLD AUTO: 0 % — SIGNIFICANT CHANGE UP (ref 0–2)
BILIRUB SERPL-MCNC: <0.2 MG/DL — SIGNIFICANT CHANGE UP (ref 0.2–1.2)
BUN SERPL-MCNC: 21 MG/DL — SIGNIFICANT CHANGE UP (ref 7–23)
CALCIUM SERPL-MCNC: 9.9 MG/DL — SIGNIFICANT CHANGE UP (ref 8.4–10.5)
CHLORIDE SERPL-SCNC: 102 MMOL/L — SIGNIFICANT CHANGE UP (ref 96–108)
CO2 SERPL-SCNC: 27 MMOL/L — SIGNIFICANT CHANGE UP (ref 22–31)
CREAT SERPL-MCNC: 0.76 MG/DL — SIGNIFICANT CHANGE UP (ref 0.5–1.3)
DACRYOCYTES BLD QL SMEAR: SLIGHT — SIGNIFICANT CHANGE UP
EGFR: 88 ML/MIN/1.73M2 — SIGNIFICANT CHANGE UP
EOSINOPHIL # BLD AUTO: 0.08 K/UL — SIGNIFICANT CHANGE UP (ref 0–0.5)
EOSINOPHIL NFR BLD AUTO: 0.9 % — SIGNIFICANT CHANGE UP (ref 0–6)
GIANT PLATELETS BLD QL SMEAR: PRESENT — SIGNIFICANT CHANGE UP
GLUCOSE SERPL-MCNC: 125 MG/DL — HIGH (ref 70–99)
HCT VFR BLD CALC: 34.2 % — LOW (ref 34.5–45)
HGB BLD-MCNC: 11 G/DL — LOW (ref 11.5–15.5)
HYPOCHROMIA BLD QL: SLIGHT — SIGNIFICANT CHANGE UP
LYMPHOCYTES # BLD AUTO: 1.94 K/UL — SIGNIFICANT CHANGE UP (ref 1–3.3)
LYMPHOCYTES # BLD AUTO: 20.9 % — SIGNIFICANT CHANGE UP (ref 13–44)
MACROCYTES BLD QL: SIGNIFICANT CHANGE UP
MAGNESIUM SERPL-MCNC: 1.1 MG/DL — LOW (ref 1.6–2.6)
MANUAL SMEAR VERIFICATION: SIGNIFICANT CHANGE UP
MCHC RBC-ENTMCNC: 32.2 GM/DL — SIGNIFICANT CHANGE UP (ref 32–36)
MCHC RBC-ENTMCNC: 34.4 PG — HIGH (ref 27–34)
MCV RBC AUTO: 106.9 FL — HIGH (ref 80–100)
METAMYELOCYTES # FLD: 0.9 % — HIGH (ref 0–0)
MONOCYTES # BLD AUTO: 0.64 K/UL — SIGNIFICANT CHANGE UP (ref 0–0.9)
MONOCYTES NFR BLD AUTO: 6.9 % — SIGNIFICANT CHANGE UP (ref 2–14)
NEUTROPHILS # BLD AUTO: 6.39 K/UL — SIGNIFICANT CHANGE UP (ref 1.8–7.4)
NEUTROPHILS NFR BLD AUTO: 68.7 % — SIGNIFICANT CHANGE UP (ref 43–77)
PLAT MORPH BLD: ABNORMAL
PLATELET # BLD AUTO: 285 K/UL — SIGNIFICANT CHANGE UP (ref 150–400)
POIKILOCYTOSIS BLD QL AUTO: SLIGHT — SIGNIFICANT CHANGE UP
POTASSIUM SERPL-MCNC: 4.3 MMOL/L — SIGNIFICANT CHANGE UP (ref 3.5–5.3)
POTASSIUM SERPL-SCNC: 4.3 MMOL/L — SIGNIFICANT CHANGE UP (ref 3.5–5.3)
PROT SERPL-MCNC: 7.3 G/DL — SIGNIFICANT CHANGE UP (ref 6–8.3)
RBC # BLD: 3.2 M/UL — LOW (ref 3.8–5.2)
RBC # FLD: 13.4 % — SIGNIFICANT CHANGE UP (ref 10.3–14.5)
RBC BLD AUTO: ABNORMAL
SARS-COV-2 RNA SPEC QL NAA+PROBE: NEGATIVE — SIGNIFICANT CHANGE UP
SODIUM SERPL-SCNC: 139 MMOL/L — SIGNIFICANT CHANGE UP (ref 135–145)
VARIANT LYMPHS # BLD: 1.7 % — SIGNIFICANT CHANGE UP (ref 0–6)
WBC # BLD: 9.3 K/UL — SIGNIFICANT CHANGE UP (ref 3.8–10.5)
WBC # FLD AUTO: 9.3 K/UL — SIGNIFICANT CHANGE UP (ref 3.8–10.5)

## 2022-11-12 PROCEDURE — 71045 X-RAY EXAM CHEST 1 VIEW: CPT

## 2022-11-12 PROCEDURE — 80053 COMPREHEN METABOLIC PANEL: CPT

## 2022-11-12 PROCEDURE — 82962 GLUCOSE BLOOD TEST: CPT

## 2022-11-12 PROCEDURE — 86780 TREPONEMA PALLIDUM: CPT

## 2022-11-12 PROCEDURE — 97116 GAIT TRAINING THERAPY: CPT

## 2022-11-12 PROCEDURE — 86901 BLOOD TYPING SEROLOGIC RH(D): CPT

## 2022-11-12 PROCEDURE — 83540 ASSAY OF IRON: CPT

## 2022-11-12 PROCEDURE — 82746 ASSAY OF FOLIC ACID SERUM: CPT

## 2022-11-12 PROCEDURE — 86900 BLOOD TYPING SEROLOGIC ABO: CPT

## 2022-11-12 PROCEDURE — 80307 DRUG TEST PRSMV CHEM ANLYZR: CPT

## 2022-11-12 PROCEDURE — U0005: CPT

## 2022-11-12 PROCEDURE — 85610 PROTHROMBIN TIME: CPT

## 2022-11-12 PROCEDURE — 82728 ASSAY OF FERRITIN: CPT

## 2022-11-12 PROCEDURE — 81001 URINALYSIS AUTO W/SCOPE: CPT

## 2022-11-12 PROCEDURE — 84466 ASSAY OF TRANSFERRIN: CPT

## 2022-11-12 PROCEDURE — 85025 COMPLETE CBC W/AUTO DIFF WBC: CPT

## 2022-11-12 PROCEDURE — 87086 URINE CULTURE/COLONY COUNT: CPT

## 2022-11-12 PROCEDURE — 97161 PT EVAL LOW COMPLEX 20 MIN: CPT

## 2022-11-12 PROCEDURE — 85045 AUTOMATED RETICULOCYTE COUNT: CPT

## 2022-11-12 PROCEDURE — 70553 MRI BRAIN STEM W/O & W/DYE: CPT

## 2022-11-12 PROCEDURE — 36415 COLL VENOUS BLD VENIPUNCTURE: CPT

## 2022-11-12 PROCEDURE — 96366 THER/PROPH/DIAG IV INF ADDON: CPT

## 2022-11-12 PROCEDURE — 87635 SARS-COV-2 COVID-19 AMP PRB: CPT

## 2022-11-12 PROCEDURE — 84100 ASSAY OF PHOSPHORUS: CPT

## 2022-11-12 PROCEDURE — 99239 HOSP IP/OBS DSCHRG MGMT >30: CPT | Mod: 1L,GC

## 2022-11-12 PROCEDURE — 70496 CT ANGIOGRAPHY HEAD: CPT | Mod: MA

## 2022-11-12 PROCEDURE — 83735 ASSAY OF MAGNESIUM: CPT

## 2022-11-12 PROCEDURE — 84484 ASSAY OF TROPONIN QUANT: CPT

## 2022-11-12 PROCEDURE — U0003: CPT

## 2022-11-12 PROCEDURE — 86850 RBC ANTIBODY SCREEN: CPT

## 2022-11-12 PROCEDURE — 93306 TTE W/DOPPLER COMPLETE: CPT

## 2022-11-12 PROCEDURE — 70498 CT ANGIOGRAPHY NECK: CPT | Mod: MA

## 2022-11-12 PROCEDURE — 96368 THER/DIAG CONCURRENT INF: CPT

## 2022-11-12 PROCEDURE — 84443 ASSAY THYROID STIM HORMONE: CPT

## 2022-11-12 PROCEDURE — 87529 HSV DNA AMP PROBE: CPT

## 2022-11-12 PROCEDURE — 96375 TX/PRO/DX INJ NEW DRUG ADDON: CPT

## 2022-11-12 PROCEDURE — 86803 HEPATITIS C AB TEST: CPT

## 2022-11-12 PROCEDURE — 99285 EMERGENCY DEPT VISIT HI MDM: CPT

## 2022-11-12 PROCEDURE — 80048 BASIC METABOLIC PNL TOTAL CA: CPT

## 2022-11-12 PROCEDURE — 72128 CT CHEST SPINE W/O DYE: CPT | Mod: MA

## 2022-11-12 PROCEDURE — 72125 CT NECK SPINE W/O DYE: CPT | Mod: MA

## 2022-11-12 PROCEDURE — 83550 IRON BINDING TEST: CPT

## 2022-11-12 PROCEDURE — 85730 THROMBOPLASTIN TIME PARTIAL: CPT

## 2022-11-12 PROCEDURE — 93005 ELECTROCARDIOGRAM TRACING: CPT

## 2022-11-12 PROCEDURE — 85027 COMPLETE CBC AUTOMATED: CPT

## 2022-11-12 PROCEDURE — 99284 EMERGENCY DEPT VISIT MOD MDM: CPT

## 2022-11-12 PROCEDURE — 82607 VITAMIN B-12: CPT

## 2022-11-12 PROCEDURE — 70450 CT HEAD/BRAIN W/O DYE: CPT | Mod: MA

## 2022-11-12 PROCEDURE — 87186 SC STD MICRODIL/AGAR DIL: CPT

## 2022-11-12 PROCEDURE — 96365 THER/PROPH/DIAG IV INF INIT: CPT

## 2022-11-12 PROCEDURE — A9585: CPT

## 2022-11-12 PROCEDURE — 72131 CT LUMBAR SPINE W/O DYE: CPT | Mod: MA

## 2022-11-12 RX ORDER — MAGNESIUM OXIDE 400 MG ORAL TABLET 241.3 MG
400 TABLET ORAL ONCE
Refills: 0 | Status: COMPLETED | OUTPATIENT
Start: 2022-11-12 | End: 2022-11-12

## 2022-11-12 RX ADMIN — Medication 1 MILLIGRAM(S): at 12:22

## 2022-11-12 RX ADMIN — MAGNESIUM OXIDE 400 MG ORAL TABLET 400 MILLIGRAM(S): 241.3 TABLET ORAL at 21:10

## 2022-11-12 NOTE — ED ADULT NURSE REASSESSMENT NOTE - NS ED NURSE REASSESS COMMENT FT1
PA states medicine refused to admit, will plan to hold in ED for SW in AM, PA d/w daughter, who states she is going home for the night

## 2022-11-12 NOTE — ED ADULT NURSE REASSESSMENT NOTE - NS ED NURSE REASSESS COMMENT FT1
pt. medicated as noted, sujatha. well, given sandwich and ginger ale, per request, assessment on-going, pending further orders

## 2022-11-12 NOTE — ED ADULT NURSE NOTE - NSIMPLEMENTINTERV_GEN_ALL_ED
Implemented All Fall with Harm Risk Interventions:  [place] to call system. Call bell, personal items and telephone within reach. Instruct patient to call for assistance. Room bathroom lighting operational. Non-slip footwear when patient is off stretcher. Physically safe environment: no spills, clutter or unnecessary equipment. Stretcher in lowest position, wheels locked, appropriate side rails in place. Provide visual cue, wrist band, yellow gown, etc. Monitor gait and stability. Monitor for mental status changes and reorient to person, place, and time. Review medications for side effects contributing to fall risk. Reinforce activity limits and safety measures with patient and family. Provide visual clues: red socks.

## 2022-11-12 NOTE — ED ADULT NURSE NOTE - OBJECTIVE STATEMENT
pt was discharged from this hospital today after admission for gait dysfunction and confusion secondary to Wernicke's encephalopathy.  pt was discharged to [facility].  pt's daughter states the rehab facility served patient spoiled fruit, was not supervising patients, and the bed looked like it was falling apart.  pt has new symptoms since discharge.  pt was getting PT while here at [facility].      pt oriented to person only.  pt denies pain.  Moves all extremities.  pt alert, friendly, conversant. pt was discharged from this hospital today after admission for gait dysfunction and confusion secondary to Wernicke's encephalopathy.  pt was discharged to [facility].  pt's daughter states the rehab facility served patient spoiled fruit, was not supervising patients, and the bed looked like it was falling apart. pt's daughter requesting a different rehab center.  pt has no new symptoms since discharge.  pt was getting PT while here at [facility].  pt's LOC is pt's baseline per daughter.       pt oriented to person only.  pt denies pain.  Moves all extremities.  pt alert, friendly, conversant.

## 2022-11-12 NOTE — ED ADULT TRIAGE NOTE - CHIEF COMPLAINT QUOTE
Pt sent in d/t family being unhappy with rehab center after being discharged from the hospital earlier today. Denies any new symptoms.

## 2022-11-12 NOTE — ED PROVIDER NOTE - PATIENT PORTAL LINK FT
You can access the FollowMyHealth Patient Portal offered by [facility] by registering at the following website: [email or web address]. By joining [email or web address]’s FollowMyHealth portal, you will also be able to view your health information using other applications (apps) compatible with our system.

## 2022-11-12 NOTE — ED PROVIDER NOTE - PHYSICAL EXAMINATION
CONSTITUTIONAL: NAD   SKIN: Normal color and turgor.    HEAD: NC/AT.  EYES: Conjunctiva clear. EOMI. PERRL.  No nystagmus.   ENT: Airway clear. Normal voice.   RESPIRATORY:  Normal work of breathing. Lungs CTAB.  CARDIOVASCULAR:  RRR, S1S2. No M/R/G.      GI:  Abdomen soft, nontender.    MSK: Neck supple.  No LE edema or calf tenderness. No joint swelling or ROM limitation.  NEURO: Alert; CN: grossly intact. Speech clear.  GLORIA. SILT.  No hand tremor. No tongue fasciculation.  F-N normal.

## 2022-11-12 NOTE — ED PROVIDER NOTE - CLINICAL SUMMARY MEDICAL DECISION MAKING FREE TEXT BOX
Pt discharged earlier today to SNF, daughter unhappy with the facility and concerned about her mother's safety (including fall risk, no railings on beds).  Pt without acute complaints.  Appears comfortable, nontoxic.  No evidence of alcohol withdrawal.   Will need to have CM/SW work to get her to another facility. Pt discharged earlier today to SNF, daughter unhappy with the facility and concerned about her mother's safety (including fall risk, no railings on beds).  Pt without acute complaints.  Appears comfortable, nontoxic.  No evidence of alcohol withdrawal.   Will need to have CM/SW work to get her to another facility.  Tried to admit overnight while awaiting SW/CM but hospitalist refused the admission.  SW/CM to eval in morning.

## 2022-11-12 NOTE — ED PROVIDER NOTE - OBJECTIVE STATEMENT
63 yo fem PMHx alcohol abuse, MDD  Was just discharged from [facility] earlier today after admission for Wernicke encephalopathy and UTI; she was sent to [facility] for DONALD.  She now returns to the ED because after arriving at the SNF her daughter felt the facility was unclean, disorganized and she was concerned about her mother's safety there. She had a long list of complaints about the facility and she is adamant that her mother not be sent back there.    The patient has no complaints.  She received her morning dose of thiamine today.  She ate a sandwich this afternoon but has not eaten dinner.   Pt alert with clear speech, fluent conversation; oriented to self, recognizes her daughter and knows daughter's name, knows she is in hospital. Does not know the date.  Daughter says this is her baseline.

## 2022-11-12 NOTE — ED PROVIDER NOTE - PROGRESS NOTE DETAILS
d/w hospitalist, not willing to re-admit.  d/w on call  (Iliana), she will notify CM for morning evaluation. gil: pt received at sign out from giovanna haq as pending sw / case management eval for ? placement into [facility] facility, pt seen by sw/case management in am and plan is to send back to same [facility] as arranged upon hosp dc

## 2022-11-12 NOTE — ED ADULT NURSE REASSESSMENT NOTE - NS ED NURSE REASSESS COMMENT FT1
pt. incontinent of urine, connor care provided, linens changed, Pure Wick placed to CWS, per pt. request, provided with additional warm blanket for comfort, assessment on-going

## 2022-11-13 VITALS
HEART RATE: 82 BPM | SYSTOLIC BLOOD PRESSURE: 119 MMHG | TEMPERATURE: 98 F | RESPIRATION RATE: 18 BRPM | DIASTOLIC BLOOD PRESSURE: 80 MMHG | OXYGEN SATURATION: 100 %

## 2022-11-13 PROBLEM — F10.10 ALCOHOL ABUSE, UNCOMPLICATED: Chronic | Status: ACTIVE | Noted: 2022-11-03

## 2022-11-13 NOTE — ED ADULT NURSE REASSESSMENT NOTE - NS ED NURSE REASSESS COMMENT FT1
pt. with increasing AMS, attempting to get OOB, despite re-direction, asking where her daughter is, and where the [facility] is, and that she's ready to go home, provider notified, placed on 1:1 due to fall risk/elopement risk, due to ams, assessment on-going, Kash was notified

## 2022-11-13 NOTE — ED ADULT NURSE REASSESSMENT NOTE - NS ED NURSE REASSESS COMMENT FT1
Patient awaiting ambulance transportation back to [facility]. Daughter at bedside and in agreement with plan for discharge back to [facility]. Case Management spoke with DONALD and daughter.

## 2022-11-14 PROBLEM — Z00.00 ENCOUNTER FOR PREVENTIVE HEALTH EXAMINATION: Status: ACTIVE | Noted: 2022-11-14

## 2022-11-21 DIAGNOSIS — E83.42 HYPOMAGNESEMIA: ICD-10-CM

## 2022-11-21 DIAGNOSIS — M48.061 SPINAL STENOSIS, LUMBAR REGION WITHOUT NEUROGENIC CLAUDICATION: ICD-10-CM

## 2022-11-21 DIAGNOSIS — F32.9 MAJOR DEPRESSIVE DISORDER, SINGLE EPISODE, UNSPECIFIED: ICD-10-CM

## 2022-11-21 DIAGNOSIS — E51.2 WERNICKE'S ENCEPHALOPATHY: ICD-10-CM

## 2022-11-21 DIAGNOSIS — E86.0 DEHYDRATION: ICD-10-CM

## 2022-11-21 DIAGNOSIS — E44.1 MILD PROTEIN-CALORIE MALNUTRITION: ICD-10-CM

## 2022-11-21 DIAGNOSIS — H55.00 UNSPECIFIED NYSTAGMUS: ICD-10-CM

## 2022-11-21 DIAGNOSIS — R62.7 ADULT FAILURE TO THRIVE: ICD-10-CM
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
## 2022-11-21 DIAGNOSIS — L89.322 PRESSURE ULCER OF LEFT BUTTOCK, STAGE 2: ICD-10-CM
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
## 2022-11-21 DIAGNOSIS — L89.312 PRESSURE ULCER OF RIGHT BUTTOCK, STAGE 2: ICD-10-CM

## 2022-11-21 DIAGNOSIS — Z20.822 CONTACT WITH AND (SUSPECTED) EXPOSURE TO COVID-19: ICD-10-CM
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
## 2022-11-21 DIAGNOSIS — E87.6 HYPOKALEMIA: ICD-10-CM

## 2022-11-21 DIAGNOSIS — D75.89 OTHER SPECIFIED DISEASES OF BLOOD AND BLOOD-FORMING ORGANS: ICD-10-CM

## 2022-11-21 DIAGNOSIS — N39.0 URINARY TRACT INFECTION, SITE NOT SPECIFIED: ICD-10-CM

## 2022-11-21 DIAGNOSIS — R41.82 ALTERED MENTAL STATUS, UNSPECIFIED: ICD-10-CM

## 2022-11-21 DIAGNOSIS — L89.152 PRESSURE ULCER OF SACRAL REGION, STAGE 2: ICD-10-CM

## 2022-11-21 DIAGNOSIS — F10.139 ALCOHOL ABUSE WITH WITHDRAWAL, UNSPECIFIED: ICD-10-CM
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
## 2022-11-21 DIAGNOSIS — Z51.5 ENCOUNTER FOR PALLIATIVE CARE: ICD-10-CM

## 2022-11-21 DIAGNOSIS — F17.210 NICOTINE DEPENDENCE, CIGARETTES, UNCOMPLICATED: ICD-10-CM

## 2023-07-20 ENCOUNTER — TRANSCRIPTION ENCOUNTER (OUTPATIENT)
Age: 63
End: 2023-07-20

## 2025-02-16 NOTE — PROGRESS NOTE ADULT - PROBLEM SELECTOR PLAN 2
[facility] Hospitalist Progress Note        Subjective   Pt complains of abd pain when coughing. Complains of abd muscle soreness with coughing. No nausea. No other complaints. Tolerating flushes ok, abd idscomofrt only with coughing.   Objective     I/O's    Intake/Output Summary (Last 24 hours) at 2/16/2025 1019  Last data filed at 2/16/2025 0921  Gross per 24 hour   Intake 701 ml   Output 1895 ml   Net -1194 ml       Last Recorded Vitals  Blood pressure 114/70, pulse 89, temperature 98.1 °F (36.7 °C), temperature source Oral, resp. rate 17, height 6' (1.829 m), weight 112 kg (247 lb), SpO2 93%.  Body mass index is 33.5 kg/m².    Physical Exam  Vitals and nursing note reviewed.   Constitutional:       General: He is not in acute distress.     Appearance: Normal appearance.   HENT:      Head: Normocephalic.      Neck: Neck supple.   Eyes:      Conjunctiva/sclera: Conjunctivae normal.   Cardiovascular:      Rate and Rhythm: Normal rate and regular rhythm.      Heart sounds: Normal heart sounds.   Pulmonary:      Effort: No respiratory distress.      Breath sounds: Normal breath sounds. No wheezing.   Abdominal:      General: There is no distension.      Palpations: Abdomen is soft.      Tenderness: There is no abdominal tenderness.      Comments: Gtube soft, nontender. Ostomy with stool.    Musculoskeletal:      Right lower leg: No edema.      Left lower leg: No edema.   Skin:     General: Skin is warm.   Neurological:      Mental Status: He is alert.   Psychiatric:         Mood and Affect: Mood normal.          Labs     Recent Results (from the past 24 hour(s))   GLUCOSE, BEDSIDE - POINT OF CARE    Collection Time: 02/15/25 12:06 PM    Specimen: Blood   Result Value Ref Range    GLUCOSE, BEDSIDE - POINT OF CARE 92 70 - 99 mg/dL   GLUCOSE, BEDSIDE - POINT OF CARE    Collection Time: 02/15/25  6:16 PM    Specimen: Blood   Result Value Ref Range    GLUCOSE, BEDSIDE - POINT OF CARE 86 70 - 99 mg/dL   GLUCOSE, BEDSIDE - POINT  Upon admission Alcohol level <10.  CIWA score 0 at the time of assessment by ICU team, s/p Ativan 1mg for agitation. For the last days friend has probably provided 1-2 beers per day to avoid withdrawal. Patient consumes 6 beers per day at baseline. Friend unsure if patient has history of seizures, intubation, previous alcohol withdrawals nor esophageal bleeds.     CIWA 0 this AM    -c/w CIWA monitoring ---> ativan 2 mg IVP q2h/PRN   -fall precautions  -NPO for aspiration precautions in the setting of altered mental status   -Thiamine 500 mg IV x 5 day  -Folic acid 100 mg PO, MV OF CARE    Collection Time: 02/15/25 11:40 PM    Specimen: Blood   Result Value Ref Range    GLUCOSE, BEDSIDE - POINT OF CARE 100 (H) 70 - 99 mg/dL   Potassium    Collection Time: 02/16/25  4:42 AM    Specimen: Blood, Venous   Result Value Ref Range    Potassium 3.9 3.4 - 5.1 mmol/L   Basic Metabolic Panel    Collection Time: 02/16/25  4:42 AM    Specimen: Blood, Venous   Result Value Ref Range    Fasting Status      Sodium 140 135 - 145 mmol/L    Potassium 4.0 3.4 - 5.1 mmol/L    Chloride 104 97 - 110 mmol/L    Carbon Dioxide 29 21 - 32 mmol/L    Anion Gap 11 7 - 19 mmol/L    Glucose 116 (H) 70 - 99 mg/dL    BUN 16 6 - 20 mg/dL    Creatinine 1.12 0.67 - 1.17 mg/dL    Glomerular Filtration Rate 68 >=60    BUN/Cr 14 7 - 25    Calcium 9.4 8.4 - 10.2 mg/dL   Magnesium    Collection Time: 02/16/25  4:42 AM    Specimen: Blood, Venous   Result Value Ref Range    Magnesium 1.9 1.7 - 2.4 mg/dL   Phosphorus    Collection Time: 02/16/25  4:42 AM    Specimen: Blood, Venous   Result Value Ref Range    Phosphorus 2.8 2.4 - 4.7 mg/dL   GLUCOSE, BEDSIDE - POINT OF CARE    Collection Time: 02/16/25  6:17 AM    Specimen: Blood   Result Value Ref Range    GLUCOSE, BEDSIDE - POINT OF CARE 113 (H) 70 - 99 mg/dL   GLUCOSE, BEDSIDE - POINT OF CARE    Collection Time: 02/16/25  8:36 AM    Specimen: Blood   Result Value Ref Range    GLUCOSE, BEDSIDE - POINT OF CARE 108 (H) 70 - 99 mg/dL       Current Facility-Administered Medications   Medication Dose Route Frequency Provider Last Rate Last Admin    sodium chloride 0.9% infusion   Intravenous Continuous Jeromy Mcdermott DO 50 mL/hr at 02/14/25 1017 New Bag at 02/14/25 1017    [Held by provider] apixaBAN (ELIQUIS) tablet 5 mg  5 mg Oral 2 times per day Rayray Dawn DO   5 mg at 02/13/25 0826    [Held by provider] LORazepam (ATIVAN) injection 0.5 mg  0.5 mg Intravenous Q6H PRN Chente Kaye MD        pantoprazole (PROTONIX) 40 MG/20ML (compounded) suspension 40 mg  40 mg Per NG Tube 2 times  per day Chente Kaye MD   40 mg at 02/16/25 0923    torsemide (DEMADEX) tablet 20 mg  20 mg Oral Daily Alisia Gamboa MD   20 mg at 02/16/25 0918    spironolactone (ALDACTONE) tablet 25 mg  25 mg Oral Daily Alisia Gamboa MD   25 mg at 02/16/25 0918    traZODone (DESYREL) tablet 50 mg  50 mg Oral Nightly Chente Kaye MD   50 mg at 02/15/25 2020    metoPROLOL tartrate (LOPRESSOR) tablet 25 mg  25 mg Per NG Tube 2 times per day Rayray Dawn DO   25 mg at 02/16/25 0918    melatonin tablet 6 mg  6 mg Oral Nightly Sarika Barraza MD   6 mg at 02/15/25 2020    gabapentin (NEURONTIN) 250 MG/5ML solution 200 mg  200 mg Per NG Tube Q12H Sarika Barraza MD   200 mg at 02/16/25 0620    docusate sodium-sennosides (SENOKOT S) 50-8.6 MG 2 tablet  2 tablet Oral Daily Sarika Barraza MD   2 tablet at 02/16/25 0918    sodium chloride 0.9 % injection 10 mL  10 mL Intravenous PRN Chente Kaye MD        sodium chloride 0.9 % injection 2 mL  2 mL Intracatheter 2 times per day Chente Kaye MD   2 mL at 02/16/25 0919    atorvastatin (LIPITOR) tablet 40 mg  40 mg Per NG Tube Nightly Marshall Matthews DO   40 mg at 02/15/25 2020    tamsulosin (FLOMAX) capsule 0.4 mg  0.4 mg Oral Daily Chente Kaye MD   0.4 mg at 02/16/25 0918    dextrose 50 % injection 25 g  25 g Intravenous PRN Marshall Matthews DO        dextrose 50 % injection 12.5 g  12.5 g Intravenous PRN Marshall Matthews DO        glucagon (GLUCAGEN) injection 1 mg  1 mg Intramuscular PRN Marshall Matthews,         dextrose (GLUTOSE) 40 % gel 15 g  15 g Oral PRN Marshall Matthews DO        dextrose (GLUTOSE) 40 % gel 30 g  30 g Oral PRN Marshall Matthews R, DO        Potassium Standard Replacement Protocol (Levels 3.5 and lower)   Does not apply See Admin Instructions Marshall Matthews, DO        Potassium Replacement (Levels 3.6 - 4)   Does not apply See Admin Instructions Marshall Matthews, DO        Magnesium Standard Replacement Protocol    Does not apply See Admin Instructions Marshall Matthews DO        Phosphorus Standard Replacement Protocol   Does not apply See Admin Instructions Marshall Matthews DO        acetaminophen (TYLENOL) tablet 650 mg  650 mg Per NG Tube Q4H PRN Marshall Matthews DO   650 mg at 02/12/25 0558    polyethylene glycol (MIRALAX) packet 17 g  17 g Per NG Tube Daily PRN Marshall Matthews DO        docusate sodium-sennosides (SENOKOT S) 50-8.6 MG 2 tablet  2 tablet Per NG Tube Daily PRN Marshall Matthews DO   2 tablet at 02/07/25 0935    bisacodyl (DULCOLAX) suppository 10 mg  10 mg Rectal Daily PRN Marshall Matthews DO        magnesium hydroxide (MILK OF MAGNESIA) 400 MG/5ML suspension 30 mL  30 mL Per NG Tube Daily PRN Marshall Matthews DO        insulin lispro (ADMELOG,HumaLOG) - Correction Dose   Subcutaneous 4 times per day Marshall Matthews DO   1 Units at 02/03/25 1740    ondansetron (ZOFRAN ODT) disintegrating tablet 4 mg  4 mg Oral Q12H PRN Marshall Matthews DO        Or    ondansetron (ZOFRAN) injection 4 mg  4 mg Intravenous Q12H PRN Marshall Matthews DO        albuterol (VENTOLIN) nebulizer 2.5 mg  2.5 mg Nebulization Q4H Resp PRN Marshall Matthews DO           Imaging    IR GASTROSTOMY TUBE INSERTION   Final Result      Successful placement of percutaneous gastrostomy tube as described above.      The newly placed gastrostomy tube is not to be used for tube feeds until   cleared by interventional radiology.      Please do not administered tube feeds via the gastrostomy tube or the NG   tube until clear IR to allow the newly placed gastrostomy tube tract to   heal sufficiently.      Please place new gastrostomy tube to low intermittent wall suction until   further notice.      The sutures of the patient's T-fasteners should dissolve within the next 2   weeks and the metallic T-fasteners should pass in the patient's stool.               Electronically Signed by: DANIELA SALCEDO DO     Signed on: 2/14/2025 6:20 PM    Workstation ID: [id number]      XR CHEST AP OR PA   Final Result   1.   Dobbhoff tube with the tip in the stomach.   2.   Moderate hypoinflation of the lungs with bibasilar atelectasis.            Electronically Signed by: GEN MONTEZ M.D.    Signed on: 2/14/2025 3:59 PM    Workstation ID: [id number]      Esophagogastroduodenoscopy (EGD)   Final Result      XR CHEST AP OR PA   Final Result   1. Interval slight worsening of interstitial and alveolar opacities which   could reflect edema, pneumonitis, or combination thereof.   2. Small pleural effusions.   3. Subdiaphragmatic enteric tube over the stomach.               Electronically Signed by: ANA GUERRA MD    Signed on: 2/10/2025 2:07 PM    Workstation ID: [id number]      XR CHEST AP OR PA   Final Result   1.   Small left pleural effusion.   2.   Mild bibasilar atelectasis.            Electronically Signed by: GEN MONTEZ M.D.    Signed on: 2/8/2025 9:04 AM    Workstation ID: [id number]      XR CHEST AP OR PA   Final Result         1. Tip of the enteric tube is in the proximal stomach.      2. Otherwise unchanged appearance of the chest.      Electronically Signed by: JUANPABLO JORDAN M.D.    Signed on: 2/6/2025 11:42 PM    Workstation ID: [id number]      FL VIDEO SWALLOW   Final Result   FINDINGS AND IMPRESSION:       Fluoroscopic assistance provided for swallowing evaluation performed by the   speech and language pathology department. Assistance was provided by GISELLE Duarte. The patient was given various consistencies of barium under   direct fluoroscopic visualization. There is aspiration with several of the   administered barium consistencies. Please refer to official speech and   language pathology report for further information.      Fluoroscopic time: 1 minute 7 seconds.      Electronically Signed by: MARTY VILLATORO M.D.    Signed on: 2/5/2025 2:43 PM    Workstation ID: [id number]      MRI BRAIN  WO CONTRAST   Final Result   1.  No acute infarct, mass effect, or hydrocephalus.   2.  Age-appropriate involutional change.   3.  Sequela of chronic small vessel ischemia and prior right PCA ischemic   insult.   4.  Please see above for additional observations.            Electronically Signed by: LUIS GORDON M.D.    Signed on: 2/3/2025 1:22 PM    Workstation ID: [id number]      XR CHEST AP OR PA   Final Result         1. Enteric tube with the tip projecting over the proximal stomach.      2. Mild enlargement of the cardiac and mediastinal silhouette.      3. Atelectasis or infiltrate at the left lung base with small left pleural   effusion.      Electronically Signed by: JUANPABLO JORDAN M.D.    Signed on: 2/2/2025 4:08 PM    Workstation ID: [id number]      [facility] UPPER EXTREMITY VENOUS DUPLEX RIGHT   Final Result         1. There is no evidence of deep venous thrombosis in the right upper   extremity.      2. Right jugular vein not well visualized secondary to presence of a   central line.                  Electronically Signed by: JUANPABLO JORDAN M.D.    Signed on: 2/1/2025 10:05 PM    Workstation ID: [id number]      CT HEAD WO CONTRAST   Final Result   1. No acute intracranial process.   2. Cerebral atrophy and chronic microvascular ischemic changes.   3. Encephalomalacia/gliosis posterior/medial right parietal region   suggestive of remote insult or infarct.   4. If clinical concern for acute ischemic change recommend MRI.              Electronically Signed by: ANA GUERRA MD    Signed on: 1/31/2025 6:13 PM    Workstation ID: [id number]      XR CHEST AP OR PA   Final Result      As above.               Electronically Signed by: HERMELINDA PEREIRA M.D.    Signed on: 1/31/2025 4:59 PM    Workstation ID: [id number]      XR CHEST PA OR AP 1 VIEW   Final Result      Endotracheal tube needs to be withdrawn more proximally.      Dr. Moreno notified of the findings by myself 1/31/2025 at 3:39 p.m.                Electronically Signed by: HERMELINDA PEREIRA M.D.    Signed on: 1/31/2025 3:39 PM    Workstation ID: [id number]           Current Facility-Administered Medications   Medication Dose Route Frequency Provider Last Rate Last Admin    sodium chloride 0.9% infusion   Intravenous Continuous Jeromy Mcdermott DO 50 mL/hr at 02/14/25 1017 New Bag at 02/14/25 1017    [Held by provider] apixaBAN (ELIQUIS) tablet 5 mg  5 mg Oral 2 times per day Rayray Dawn DO   5 mg at 02/13/25 0826    [Held by provider] LORazepam (ATIVAN) injection 0.5 mg  0.5 mg Intravenous Q6H PRN Chente Kaye MD        pantoprazole (PROTONIX) 40 MG/20ML (compounded) suspension 40 mg  40 mg Per NG Tube 2 times per day Chente Kaye MD   40 mg at 02/16/25 0923    torsemide (DEMADEX) tablet 20 mg  20 mg Oral Daily Alisia Gamboa MD   20 mg at 02/16/25 0918    spironolactone (ALDACTONE) tablet 25 mg  25 mg Oral Daily Alisia Gamboa MD   25 mg at 02/16/25 0918    traZODone (DESYREL) tablet 50 mg  50 mg Oral Nightly Chente Kaye MD   50 mg at 02/15/25 2020    metoPROLOL tartrate (LOPRESSOR) tablet 25 mg  25 mg Per NG Tube 2 times per day Rayray Dawn DO   25 mg at 02/16/25 0918    melatonin tablet 6 mg  6 mg Oral Nightly Sarika Barraza MD   6 mg at 02/15/25 2020    gabapentin (NEURONTIN) 250 MG/5ML solution 200 mg  200 mg Per NG Tube Q12H Sarika Barraza MD   200 mg at 02/16/25 0620    docusate sodium-sennosides (SENOKOT S) 50-8.6 MG 2 tablet  2 tablet Oral Daily Sarika Barraza MD   2 tablet at 02/16/25 0918    sodium chloride 0.9 % injection 10 mL  10 mL Intravenous PRN Chente Kaye MD        sodium chloride 0.9 % injection 2 mL  2 mL Intracatheter 2 times per day Chente Kaye MD   2 mL at 02/16/25 0919    atorvastatin (LIPITOR) tablet 40 mg  40 mg Per NG Tube Nightly Marshall Matthews DO   40 mg at 02/15/25 2020    tamsulosin (FLOMAX) capsule 0.4 mg  0.4 mg Oral Daily Chente Kaye MD   0.4 mg at 02/16/25 0918    dextrose  50 % injection 25 g  25 g Intravenous PRN Marshall Matthews DO        dextrose 50 % injection 12.5 g  12.5 g Intravenous PRN Marshall Matthews DO        glucagon (GLUCAGEN) injection 1 mg  1 mg Intramuscular PRN Marshall Matthews DO        dextrose (GLUTOSE) 40 % gel 15 g  15 g Oral PRN Marshall Matthews,         dextrose (GLUTOSE) 40 % gel 30 g  30 g Oral PRN Marshall Matthews DO        Potassium Standard Replacement Protocol (Levels 3.5 and lower)   Does not apply See Admin Instructions Marshall Matthews DO        Potassium Replacement (Levels 3.6 - 4)   Does not apply See Admin Instructions Marshall Matthews DO        Magnesium Standard Replacement Protocol   Does not apply See Admin Instructions Marshall Matthews DO        Phosphorus Standard Replacement Protocol   Does not apply See Admin Instructions Marshall Matthews DO        acetaminophen (TYLENOL) tablet 650 mg  650 mg Per NG Tube Q4H PRN Marshall Matthews DO   650 mg at 02/12/25 0558    polyethylene glycol (MIRALAX) packet 17 g  17 g Per NG Tube Daily PRN Marshall Matthews DO        docusate sodium-sennosides (SENOKOT S) 50-8.6 MG 2 tablet  2 tablet Per NG Tube Daily PRN Marshall Matthews DO   2 tablet at 02/07/25 0935    bisacodyl (DULCOLAX) suppository 10 mg  10 mg Rectal Daily PRN Marshall Matthews DO        magnesium hydroxide (MILK OF MAGNESIA) 400 MG/5ML suspension 30 mL  30 mL Per NG Tube Daily PRN Marshall Matthews DO        insulin lispro (ADMELOG,HumaLOG) - Correction Dose   Subcutaneous 4 times per day Marshall Matthews DO   1 Units at 02/03/25 1740    ondansetron (ZOFRAN ODT) disintegrating tablet 4 mg  4 mg Oral Q12H PRN Marshall Matthews DO        Or    ondansetron (ZOFRAN) injection 4 mg  4 mg Intravenous Q12H PRN Marshall Matthews DO        albuterol (VENTOLIN) nebulizer 2.5 mg  2.5 mg Nebulization Q4H Resp PRN Marshall Matthews R, DO         Assessment and Plan    Acute respiratory  failure, with hypoxia and hypercapnia, left lower lobe pneumonia due to MRSA, COPD exacerbation-resolved   -Patient initially admitted to the CCU, was intubated, than extubated  -Cultures are proper MSRA, completed a IV course of vancomycin from 2/20 through 2/9  -using bipap at night      Oropharyngeal dysphagia  -Eval by speech therapy, failed video swallow  -Patient is pursuing hospice care, however wish to have PEG tube placed  -Underwent EGD unable to place PEG tube, IR placed PEG tube on 2/14, initiate tube feedings as per IR monitor po intake     Critical illness myopathy  -hospice planned at discharge      History of gastric ulcers, prior GI bleed  -Continue on PPI twice daily     Chronic atrial fibrillation  -Stop IV metoprolol, resume p.o. metoprolol  -resume eliqus 48 hrs post g tube placement.      Chronic systolic heart failure  -euvolemic  -continue spironolactone, and torsemide     Coronary artery disease, hyperlipidemia  -Continue on statin     BPH  -Continue tamsulosin, Bacon in place     AMALIA  -Continue on BiPAP prn  -Pulmonology consultation     History of rectal cancer  -Status post colectomy with ostomy     Venous stasis ulcers  -Continue wound care     Stage IV pressure injury of sacrum  -Wound care consultation      DVT Prophylaxis    Current Active Medications for DVT Prophylaxis (From admission, onward)           Stop     [Held by provider]  apixaBAN (ELIQUIS) tablet 5 mg  5 mg,   Oral,   2 times per day        (On hold since Thu 2/13/2025 at 0906 until manually unheld; held by Rayray Dawn DOHold Reason: Change in vital signs)   On hold since Thu 2/13/2025 at 0906 until manually unheld   Hold reason: Change in vital signs    --                      Disposition:  Pending clinical improvement    Discussed with RN Subuhi F Humera, DO        Upon admission Alcohol level <10.  CIWA score 0 at the time of assessment by ICU team, s/p Ativan 1mg for agitation. For the last days friend has probably provided 1-2 beers per day to avoid withdrawal. Patient consumes 6 beers per day at baseline. Friend unsure if patient has history of seizures, intubation, previous alcohol withdrawals nor esophageal bleeds.     CIWA 0 this AM    -ativan 2 mg IVP q2h/PRN   -fall precautions    -Thiamine 500 mg IV x 5 day  -Folic acid 100 mg PO, MV

## 2025-04-16 NOTE — DISCHARGE NOTE PROVIDER - NSDCFUADDAPPT_GEN_ALL_CORE_FT
"Received a call from patient. She called to inquire about her echo results. She stated, "I saw that it was abnormal." Notified her of the echo results. Informed her that I would send a message to Dr. Garay regarding our conversation and have her review the results. Informed her that Dr. Garay is out of the office this week, but I would call her back next week with Dr. Garay's recommendations. She verbalized her understanding of all discussed.    Message sent to Dr. Garay.  " We were unable to schedule an appointment with a Neurologist as they were out of network with your insurance.  Please follow up with [facility] by call [phone] to schedule an appointment with a Primary Care Doctor who can then refer you to see a Neurology specialist. Please bring your discharge paperwork when that appointment is made.    Appointment was facilitated by Ms. BALDEMAR Marie, Referral Coordinator. We were unable to schedule an appointment with a Neurologist as they were out of network with your insurance.  Please follow up with [facility] [place] [facility]"" by calling [phone] to schedule an appointment with a Primary Care Doctor who can then refer you to see a Neurology specialist. Please bring your discharge paperwork when that appointment is made.    Appointment was facilitated by Ms. BALDEMAR Marie, Referral Coordinator. We were unable to schedule an appointment with a Neurologist as they were out of network with your insurance.  Please follow up with [facility] by calling [phone] to schedule an appointment with a Primary Care Doctor who can then refer you to see a Neurology specialist. Please bring your discharge paperwork when that appointment is made.    Appointment was facilitated by Ms. BALDEMAR Marie, Referral Coordinator.